# Patient Record
Sex: FEMALE | Race: WHITE | NOT HISPANIC OR LATINO | Employment: OTHER | URBAN - METROPOLITAN AREA
[De-identification: names, ages, dates, MRNs, and addresses within clinical notes are randomized per-mention and may not be internally consistent; named-entity substitution may affect disease eponyms.]

---

## 2017-08-27 ENCOUNTER — OFFICE VISIT (OUTPATIENT)
Dept: URGENT CARE | Facility: URGENT CARE | Age: 62
End: 2017-08-27
Payer: COMMERCIAL

## 2017-08-27 VITALS
HEART RATE: 76 BPM | OXYGEN SATURATION: 97 % | WEIGHT: 172.5 LBS | SYSTOLIC BLOOD PRESSURE: 141 MMHG | BODY MASS INDEX: 27.43 KG/M2 | DIASTOLIC BLOOD PRESSURE: 93 MMHG | TEMPERATURE: 98.2 F

## 2017-08-27 DIAGNOSIS — R30.0 DYSURIA: Primary | ICD-10-CM

## 2017-08-27 DIAGNOSIS — R39.9 UTI SYMPTOMS: ICD-10-CM

## 2017-08-27 LAB
ALBUMIN UR-MCNC: NEGATIVE MG/DL
APPEARANCE UR: CLEAR
BACTERIA #/AREA URNS HPF: ABNORMAL /HPF
BILIRUB UR QL STRIP: NEGATIVE
COLOR UR AUTO: YELLOW
GLUCOSE UR STRIP-MCNC: NEGATIVE MG/DL
HGB UR QL STRIP: NEGATIVE
KETONES UR STRIP-MCNC: NEGATIVE MG/DL
LEUKOCYTE ESTERASE UR QL STRIP: ABNORMAL
NITRATE UR QL: NEGATIVE
NON-SQ EPI CELLS #/AREA URNS LPF: ABNORMAL /LPF
PH UR STRIP: 6 PH (ref 5–7)
RBC #/AREA URNS AUTO: ABNORMAL /HPF
SOURCE: ABNORMAL
SP GR UR STRIP: <=1.005 (ref 1–1.03)
UROBILINOGEN UR STRIP-ACNC: 0.2 EU/DL (ref 0.2–1)
WBC #/AREA URNS AUTO: ABNORMAL /HPF

## 2017-08-27 PROCEDURE — 99213 OFFICE O/P EST LOW 20 MIN: CPT | Performed by: PHYSICIAN ASSISTANT

## 2017-08-27 PROCEDURE — 81001 URINALYSIS AUTO W/SCOPE: CPT | Performed by: PHYSICIAN ASSISTANT

## 2017-08-27 PROCEDURE — 87086 URINE CULTURE/COLONY COUNT: CPT | Performed by: PHYSICIAN ASSISTANT

## 2017-08-27 RX ORDER — CIPROFLOXACIN 250 MG/1
250 TABLET, FILM COATED ORAL 2 TIMES DAILY
Qty: 6 TABLET | Refills: 0 | Status: SHIPPED | OUTPATIENT
Start: 2017-08-27 | End: 2017-08-30

## 2017-08-27 ASSESSMENT — ENCOUNTER SYMPTOMS
VOMITING: 0
ABDOMINAL PAIN: 0
HEMATURIA: 0
SHORTNESS OF BREATH: 0
DYSURIA: 1
SORE THROAT: 0
FEVER: 0
PALPITATIONS: 0
WHEEZING: 0
FLANK PAIN: 0
FREQUENCY: 1
DIARRHEA: 0
COUGH: 0
CHILLS: 0
NAUSEA: 0

## 2017-08-27 NOTE — PROGRESS NOTES
SUBJECTIVE:                                                    Jena Ibarra is a 62 year old female who presents to clinic today for the following health issues:      Urinary       Duration: a few weeks     Description (location/character/radiation): urgency, pressure in lower abdomen, headache     Intensity:  mild    Accompanying signs and symptoms: none     History (similar episodes/previous evaluation): None    Precipitating or alleviating factors: None    Therapies tried and outcome: has been pushing fluids, ibuprofen with no relief      Has been pushing fluids today. Feels very similar to previous UTI's. Symptoms started after having intercourse. No fever/chills.     Problem list and histories reviewed & adjusted, as indicated.  Additional history: as documented    Patient Active Problem List   Diagnosis     CARDIOVASCULAR SCREENING; LDL GOAL LESS THAN 160     Wrist pain     Advanced directives, counseling/discussion     Radial styloid tenosynovitis     CTS (carpal tunnel syndrome)     Atrophic vaginitis     Past Surgical History:   Procedure Laterality Date     COLONOSCOPY       COLONOSCOPY WITH CO2 INSUFFLATION N/A 10/13/2014    Procedure: COLONOSCOPY WITH CO2 INSUFFLATION;  Surgeon: Jamal Beach MD;  Location: [facility]     TUBAL LIGATION         Social History   Substance Use Topics     Smoking status: Never Smoker     Smokeless tobacco: Never Used     Alcohol use Yes      Comment: occasional     Family History   Problem Relation Age of Onset     CANCER Mother      kidney     DIABETES Mother      HEART DISEASE Mother      Lipids Mother      Hypertension Mother      Arthritis Father      Lipids Father      Hypertension Father      OSTEOPOROSIS Father      Arthritis Sister      Neurologic Disorder Sister      seizures     CANCER Brother      pancreatic         Current Outpatient Prescriptions   Medication Sig Dispense Refill     ciprofloxacin (CIPRO) 250 MG tablet Take 1 tablet (250 mg) by mouth  2 times daily for 3 days 6 tablet 0     Calcium Carbonate-Vitamin D (CALCIUM + D PO) Take  by mouth.       No Known Allergies  Labs reviewed in EPIC      ROS:  Review of Systems   Constitutional: Negative for chills, fever and malaise/fatigue.   HENT: Negative for congestion, ear pain and sore throat.    Respiratory: Negative for cough, shortness of breath and wheezing.    Cardiovascular: Negative for chest pain and palpitations.   Gastrointestinal: Negative for abdominal pain, diarrhea, nausea and vomiting.   Genitourinary: Positive for dysuria, frequency and urgency. Negative for flank pain and hematuria.   Skin: Negative for rash.         OBJECTIVE:     BP (!) 141/93  Pulse 76  Temp 98.2  F (36.8  C) (Tympanic)  Wt 172 lb 8 oz (78.2 kg)  SpO2 97%  BMI 27.43 kg/m2  Body mass index is 27.43 kg/(m^2).  Physical Exam   Constitutional: She is well-developed, well-nourished, and in no distress.   Abdominal: There is no tenderness.   No CVA tenderness   Psychiatric:   Alert and cooperative       Diagnostic Test Results:  Urinalysis - small leukocytes    ASSESSMENT/PLAN:     1. Dysuria  Will culture urine. Continue to monitor symptom and push fluids. Prescribed cipro x 3 days that she can fill if symptoms worsen or do not improve. Return to clinic if symptoms worsen or do not improve; otherwise follow up as needed    - ciprofloxacin (CIPRO) 250 MG tablet; Take 1 tablet (250 mg) by mouth 2 times daily for 3 days  Dispense: 6 tablet; Refill: 0  - Urine Culture Aerobic Bacterial    2. UTI symptoms    - *UA reflex to Microscopic and Culture ([place] [facility] (except [place] and [place])  - Urine Microscopic       See Patient Instructions    Helene Valadez PA-C  [facility]

## 2017-08-27 NOTE — PATIENT INSTRUCTIONS
Continue to monitor symptoms and push fluids  Will culture urine  Can fill antibiotic if symptoms worsen or do not improve.

## 2017-08-27 NOTE — NURSING NOTE
"Chief Complaint   Patient presents with     Urinary Problem       Initial BP (!) 141/93  Pulse 76  Temp 98.2  F (36.8  C) (Tympanic)  Wt 172 lb 8 oz (78.2 kg)  SpO2 97%  BMI 27.43 kg/m2 Estimated body mass index is 27.43 kg/(m^2) as calculated from the following:    Height as of 8/13/15: 5' 6.5\" (1.689 m).    Weight as of this encounter: 172 lb 8 oz (78.2 kg).  Medication Reconciliation: complete       JAKE Sesay    "

## 2017-08-27 NOTE — MR AVS SNAPSHOT
After Visit Summary   8/27/2017    Jena Ibarra    MRN: [id number]           Patient Information     Date Of Birth          1955        Visit Information        Provider Department      8/27/2017 2:35 PM Helene Valadez PA-C [facility] [place] [facility]        Today's Diagnoses     Dysuria    -  1    UTI symptoms          Care Instructions    Continue to monitor symptoms and push fluids  Will culture urine  Can fill antibiotic if symptoms worsen or do not improve.           Follow-ups after your visit        Follow-up notes from your care team     Return if symptoms worsen or fail to improve.      Who to contact     If you have questions or need follow up information about today's clinic visit or your schedule please contact [facility] directly at [phone].  Normal or non-critical lab and imaging results will be communicated to you by [employer], letter or phone within 4 business days after the clinic has received the results. If you do not hear from us within 7 days, please contact the clinic through [employer] or phone. If you have a critical or abnormal lab result, we will notify you by phone as soon as possible.  Submit refill requests through [employer] or call your pharmacy and they will forward the refill request to us. Please allow 3 business days for your refill to be completed.          Additional Information About Your Visit        MyChart Information     [employer] gives you secure access to your electronic health record. If you see a primary care provider, you can also send messages to your care team and make appointments. If you have questions, please call your primary care clinic.  If you do not have a primary care provider, please call [phone] and they will assist you.        Care EveryWhere ID     This is your Care EveryWhere ID. This could be used by other organizations to access your [place] medical records  [id number]        Your Vitals Were     Pulse Temperature  Pulse Oximetry BMI (Body Mass Index)          76 98.2  F (36.8  C) (Tympanic) 97% 27.43 kg/m2         Blood Pressure from Last 3 Encounters:   08/27/17 (!) 141/93   10/08/15 126/82   08/13/15 121/73    Weight from Last 3 Encounters:   08/27/17 172 lb 8 oz (78.2 kg)   10/08/15 164 lb (74.4 kg)   08/13/15 165 lb (74.8 kg)              We Performed the Following     *UA reflex to Microscopic and Culture ([place] [facility] (except [place] and [place])     Urine Culture Aerobic Bacterial     Urine Microscopic          Today's Medication Changes          These changes are accurate as of: 8/27/17  4:04 PM.  If you have any questions, ask your nurse or doctor.               Start taking these medicines.        Dose/Directions    ciprofloxacin 250 MG tablet   Commonly known as:  CIPRO   Used for:  Dysuria   Started by:  Helene Valadez PA-C        Dose:  250 mg   Take 1 tablet (250 mg) by mouth 2 times daily for 3 days   Quantity:  6 tablet   Refills:  0         Stop taking these medicines if you haven't already. Please contact your care team if you have questions.     ASTROGLIDE Gel   Stopped by:  Helene Valadez PA-C           conjugated estrogens cream   Commonly known as:  PREMARIN   Stopped by:  Helene Valadez PA-C                Where to get your medicines      These medications were sent to [id number] - [place], [place] - [address]  [address] [address]     Phone:  [phone]     ciprofloxacin 250 MG tablet                Primary Care Provider Office Phone # Fax #    Stacia Ogden [phone] [address] [facility] [address]        Equal Access to Services     [facility] Center[facility] : Hadkassie Love, wadiada lurobinadaha, qaybcal kaalmada adriana, cheyenne atkinson. So [facility] [phone].    ATENCIÓN: Si habla español, tiene a ferraro disposición servicios gratuitos de asistencia lingüística. Llame al  [phone].    We comply with applicable federal civil rights laws and [place] laws. We do not discriminate on the basis of race, color, national origin, age, disability sex, sexual orientation or gender identity.            Thank you!     Thank you for choosing [facility]  [facility] your [facility]. Our goal is always to provide you with excellent care. Hearing back from our patients is one way we can continue to improve our services. Please take a few minutes to complete the written survey that you may receive in the mail after your visit with us. Thank you!             Your Updated Medication List - Protect others around you: Learn how to safely use, store and throw away your medicines at www.disposemymeds.org.          This list is accurate as of: 8/27/17  4:04 PM.  Always use your most recent med list.                   Brand Name Dispense Instructions for use Diagnosis    CALCIUM + D PO      Take  by mouth.        ciprofloxacin 250 MG tablet    CIPRO    6 tablet    Take 1 tablet (250 mg) by mouth 2 times daily for 3 days    Dysuria

## 2017-08-28 LAB
BACTERIA SPEC CULT: NORMAL
BACTERIA SPEC CULT: NORMAL
SPECIMEN SOURCE: NORMAL

## 2017-10-12 NOTE — PROGRESS NOTES
SUBJECTIVE:   CC: Jena Ibarra is an 62 year old woman who presents for preventive health visit.     Healthy Habits:    Do you get at least three servings of calcium containing foods daily (dairy, green leafy vegetables, etc.)? yes    Amount of exercise or daily activities, outside of work: none    Problems taking medications regularly No    Medication side effects: No    Have you had an eye exam in the past two years? yes    Do you see a dentist twice per year? yes    Do you have sleep apnea, excessive snoring or daytime drowsiness?YES          Rectal Bleeding x1 month  Will notice with BM at times- colonoscopy due next year  History of colon polyps. Last Colonoscopy in 2014.       Dizzy Spell  Incident happened x1 month ago and again x2-3 days after.  Has not happened since, reports improvements with symptoms are eating.     Requesting a refill on Premarin cream for atrophic vaginitis.   Due for a Pap test today.      Blood pressure is slightly elevated today. 146/89. No known history of hypertension    States that she is scheduled to see [facility] for her sagging eyelids but needs a referral from her PCP before she is seen.     Patient informed that anything we discuss that is not related to preventative medicine, may be billed for; patient verbalizes understanding.        Today's PHQ-2 Score:   PHQ-2 ( 1999 [employer]) 10/13/2017 10/13/2017   Q1: Little interest or pleasure in doing things 0 0   Q2: Feeling down, depressed or hopeless 0 0   PHQ-2 Score 0 0         Abuse: Current or Past(Physical, Sexual or Emotional)- No  Do you feel safe in your environment - Yes  Social History   Substance Use Topics     Smoking status: Never Smoker     Smokeless tobacco: Never Used     Alcohol use Yes      Comment: occasional     The patient does not drink >3 drinks per day nor >7 drinks per week.    Reviewed orders with patient.  Reviewed health maintenance and updated orders accordingly - Yes  Patient Active Problem  List   Diagnosis     CARDIOVASCULAR SCREENING; LDL GOAL LESS THAN 160     Wrist pain     Advanced directives, counseling/discussion     Radial styloid tenosynovitis     CTS (carpal tunnel syndrome)     Atrophic vaginitis     Past Surgical History:   Procedure Laterality Date     COLONOSCOPY       COLONOSCOPY WITH CO2 INSUFFLATION N/A 10/13/2014    Procedure: COLONOSCOPY WITH CO2 INSUFFLATION;  Surgeon: Jamal Beach MD;  Location: [facility] OR     TUBAL LIGATION         Social History   Substance Use Topics     Smoking status: Never Smoker     Smokeless tobacco: Never Used     Alcohol use Yes      Comment: occasional     Family History   Problem Relation Age of Onset     CANCER Mother      kidney     DIABETES Mother      HEART DISEASE Mother      Lipids Mother      Hypertension Mother      Colon Polyps Mother       benign     Arthritis Father      Lipids Father      Hypertension Father      OSTEOPOROSIS Father      Arthritis Sister      Neurologic Disorder Sister      seizures     CANCER Brother      pancreatic     Breast Cancer No family hx of          Current Outpatient Prescriptions   Medication Sig Dispense Refill     conjugated estrogens (PREMARIN) cream Place 0.5 g vaginally once a week 30 g 0     Calcium Carbonate-Vitamin D (CALCIUM + D PO) Take  by mouth.       No Known Allergies      Patient over age 50, mutual decision to screen reflected in health maintenance.      Pertinent mammograms are reviewed under the imaging tab.  History of abnormal Pap smear:   YES - updated in Problem List and Health Maintenance accordingly  Last 3 Pap Results:   PAP (no units)   Date Value   09/04/2014 NIL   01/28/2011 NIL       Reviewed and updated as needed this visit by clinical staff  Tobacco  Allergies  Meds  Soc Hx        Reviewed and updated as needed this visit by Provider        Past Medical History:   Diagnosis Date     Diverticulosis      History of colon polyps      Hyperlipidemia LDL goal < 160       "Osteopenia       Past Surgical History:   Procedure Laterality Date     COLONOSCOPY       COLONOSCOPY WITH CO2 INSUFFLATION N/A 10/13/2014    Procedure: COLONOSCOPY WITH CO2 INSUFFLATION;  Surgeon: Jamal Beach MD;  Location: [facility]     TUBAL LIGATION       Obstetric History       T4      L4     SAB0   TAB0   Ectopic0   Multiple0   Live Births0       # Outcome Date GA Lbr [place]/2nd Weight Sex Delivery Anes PTL Lv   6 Term            5 Term            4 Term            3 Term            2             1                    ROS:  C: NEGATIVE for fever, chills, change in weight  I: NEGATIVE for worrisome rashes, moles or lesions  E: NEGATIVE for vision changes or irritation  ENT: NEGATIVE for ear, mouth and throat problems  R: NEGATIVE for significant cough or SOB  B: NEGATIVE for masses, tenderness or discharge  CV: NEGATIVE for chest pain, palpitations or peripheral edema  GI: NEGATIVE for nausea, abdominal pain, heartburn, or change in bowel habits  : NEGATIVE for unusual urinary or vaginal symptoms. No vaginal bleeding.  M: NEGATIVE for significant arthralgias or myalgia  N: NEGATIVE for weakness, dizziness or paresthesias  P: NEGATIVE for changes in mood or affect     OBJECTIVE:   /84  Pulse 74  Temp 98  F (36.7  C) (Tympanic)  Ht 5' 5.5\" (1.664 m)  Wt 169 lb 3.2 oz (76.7 kg)  SpO2 97%  Breastfeeding? No  BMI 27.73 kg/m2  EXAM:  GENERAL: healthy, alert and no distress  EYES: Eyes grossly normal to inspection, PERRL and conjunctivae and sclerae normal. Noted bilateral redundancy and laxity of the eyelid skin.   HENT: ear canals and TM's normal, nose and mouth without ulcers or lesions  NECK: no adenopathy, no asymmetry, masses, or scars and thyroid normal to palpation  RESP: lungs clear to auscultation - no rales, rhonchi or wheezes  BREAST: normal without masses, tenderness or nipple discharge and no palpable axillary masses or adenopathy  CV: regular rate and " rhythm, normal S1 S2, no S3 or S4, no murmur, click or rub, no peripheral edema and peripheral pulses strong  ABDOMEN: soft, nontender, no hepatosplenomegaly, no masses and bowel sounds normal   (female): normal female external genitalia, normal urethral meatus, vaginal mucosa pink, moist, smooth and very dry, absent ruggae, and normal cervix/adnexa/uterus without masses or discharge  RECTAL: normal sphincter tone, no rectal masses  MS: no gross musculoskeletal defects noted, no edema  SKIN: no suspicious lesions or rashes  NEURO: Normal strength and tone, mentation intact and speech normal  PSYCH: mentation appears normal, affect normal/bright    DATA  Reviewed and discussed with patient prior to discharge.  EKG: appears normal, NSR, normal axis, normal intervals, no acute ST/T changes c/w ischemia, no LVH by voltage criteria, there are no prior tracings available    Results for orders placed or performed in visit on 10/13/17   CBC with platelets   Result Value Ref Range    WBC 5.0 4.0 - 11.0 10e9/L    RBC Count 3.98 3.8 - 5.2 10e12/L    Hemoglobin 12.2 11.7 - 15.7 g/dL    Hematocrit 37.0 35.0 - 47.0 %    MCV 93 78 - 100 fl    MCH 30.7 26.5 - 33.0 pg    MCHC 33.0 31.5 - 36.5 g/dL    RDW 12.7 10.0 - 15.0 %    Platelet Count 230 150 - 450 10e9/L       ASSESSMENT/PLAN:   Jena was seen today for physical.    Diagnoses and all orders for this visit:    Routine general medical examination at a health care facility    Cervical cancer screening  -     Pap imaged thin layer screen with HPV - recommended age 30 - 65 years (select HPV order below)  -     HPV High Risk Types DNA Cervical    Lipid screening  -     Lipid Profile (Chol, Trig, HDL, LDL calc); Future    Screening for diabetes mellitus  -     GLUCOSE; Future    Need for hepatitis C screening test  -     Hepatitis C Screen Reflex to HCV RNA Quant and Genotype; Future    Atrophic vaginitis  -     Refill: conjugated estrogens (PREMARIN) cream; Place 0.5 g  "vaginally once a week    Rectal bleeding  -     GASTROENTEROLOGY ADULT REF PROCEDURE ONLY    Dizzy spells  -     CBC with platelets  -     Vitamin B12  -     TSH with free T4 reflex  -     Basic metabolic panel  (Ca, Cl, CO2, Creat, Gluc, K, Na, BUN)  -     EKG 12-lead complete w/read - Clinics    Elevated blood pressure reading without diagnosis of hypertension  Repeat BP at the end of the visit was within goal. Continue to monitor.    Dermatochalasis of both eyelids  -     OPHTHALMOLOGY ADULT REFERRAL            COUNSELING:   Reviewed preventive health counseling, as reflected in patient instructions       Regular exercise       Healthy diet/nutrition    BP Screening:   Last 3 BP Readings:    BP Readings from Last 3 Encounters:   10/13/17 134/84   08/27/17 (!) 141/93   10/08/15 126/82       The following was recommended to the patient:  Re-screen within 4 weeks and recommend lifestyle modifications     reports that she has never smoked. She has never used smokeless tobacco.    Estimated body mass index is 27.73 kg/(m^2) as calculated from the following:    Height as of this encounter: 5' 5.5\" (1.664 m).    Weight as of this encounter: 169 lb 3.2 oz (76.7 kg).   Weight management plan: Discussed healthy diet and exercise guidelines and patient will follow up in 12 months in clinic to re-evaluate.    Counseling Resources:  ATP IV Guidelines  Pooled Cohorts Equation Calculator  Breast Cancer Risk Calculator  FRAX Risk Assessment  ICSI Preventive Guidelines  Dietary Guidelines for Americans, 2010  USDA's MyPlate  ASA Prophylaxis  Lung CA Screening    Follow up annually and as needed thoughout the year.    Tricia Delatorre MD  [facility]  "

## 2017-10-13 ENCOUNTER — OFFICE VISIT (OUTPATIENT)
Dept: FAMILY MEDICINE | Facility: CLINIC | Age: 62
End: 2017-10-13
Payer: COMMERCIAL

## 2017-10-13 VITALS
WEIGHT: 169.2 LBS | TEMPERATURE: 98 F | DIASTOLIC BLOOD PRESSURE: 84 MMHG | HEIGHT: 66 IN | HEART RATE: 74 BPM | BODY MASS INDEX: 27.19 KG/M2 | OXYGEN SATURATION: 97 % | SYSTOLIC BLOOD PRESSURE: 134 MMHG

## 2017-10-13 DIAGNOSIS — Z00.00 ROUTINE GENERAL MEDICAL EXAMINATION AT A HEALTH CARE FACILITY: Primary | ICD-10-CM

## 2017-10-13 DIAGNOSIS — K62.5 RECTAL BLEEDING: ICD-10-CM

## 2017-10-13 DIAGNOSIS — R42 DIZZY SPELLS: ICD-10-CM

## 2017-10-13 DIAGNOSIS — Z13.1 SCREENING FOR DIABETES MELLITUS: ICD-10-CM

## 2017-10-13 DIAGNOSIS — N95.2 ATROPHIC VAGINITIS: ICD-10-CM

## 2017-10-13 DIAGNOSIS — R03.0 ELEVATED BLOOD PRESSURE READING WITHOUT DIAGNOSIS OF HYPERTENSION: ICD-10-CM

## 2017-10-13 DIAGNOSIS — Z12.4 CERVICAL CANCER SCREENING: ICD-10-CM

## 2017-10-13 DIAGNOSIS — Z13.220 LIPID SCREENING: ICD-10-CM

## 2017-10-13 DIAGNOSIS — H02.833 DERMATOCHALASIS OF BOTH EYELIDS: ICD-10-CM

## 2017-10-13 DIAGNOSIS — H02.836 DERMATOCHALASIS OF BOTH EYELIDS: ICD-10-CM

## 2017-10-13 DIAGNOSIS — Z11.59 NEED FOR HEPATITIS C SCREENING TEST: ICD-10-CM

## 2017-10-13 LAB
ANION GAP SERPL CALCULATED.3IONS-SCNC: 9 MMOL/L (ref 3–14)
BUN SERPL-MCNC: 20 MG/DL (ref 7–30)
CALCIUM SERPL-MCNC: 9 MG/DL (ref 8.5–10.1)
CHLORIDE SERPL-SCNC: 105 MMOL/L (ref 94–109)
CO2 SERPL-SCNC: 26 MMOL/L (ref 20–32)
CREAT SERPL-MCNC: 0.83 MG/DL (ref 0.52–1.04)
ERYTHROCYTE [DISTWIDTH] IN BLOOD BY AUTOMATED COUNT: 12.7 % (ref 10–15)
GFR SERPL CREATININE-BSD FRML MDRD: 69 ML/MIN/1.7M2
GLUCOSE SERPL-MCNC: 85 MG/DL (ref 70–99)
HCT VFR BLD AUTO: 37 % (ref 35–47)
HGB BLD-MCNC: 12.2 G/DL (ref 11.7–15.7)
MCH RBC QN AUTO: 30.7 PG (ref 26.5–33)
MCHC RBC AUTO-ENTMCNC: 33 G/DL (ref 31.5–36.5)
MCV RBC AUTO: 93 FL (ref 78–100)
PLATELET # BLD AUTO: 230 10E9/L (ref 150–450)
POTASSIUM SERPL-SCNC: 4 MMOL/L (ref 3.4–5.3)
RBC # BLD AUTO: 3.98 10E12/L (ref 3.8–5.2)
SODIUM SERPL-SCNC: 140 MMOL/L (ref 133–144)
TSH SERPL DL<=0.005 MIU/L-ACNC: 1.95 MU/L (ref 0.4–4)
VIT B12 SERPL-MCNC: 328 PG/ML (ref 193–986)
WBC # BLD AUTO: 5 10E9/L (ref 4–11)

## 2017-10-13 PROCEDURE — 99213 OFFICE O/P EST LOW 20 MIN: CPT | Mod: 25 | Performed by: FAMILY MEDICINE

## 2017-10-13 PROCEDURE — 87624 HPV HI-RISK TYP POOLED RSLT: CPT | Performed by: FAMILY MEDICINE

## 2017-10-13 PROCEDURE — G0145 SCR C/V CYTO,THINLAYER,RESCR: HCPCS | Performed by: FAMILY MEDICINE

## 2017-10-13 PROCEDURE — 93000 ELECTROCARDIOGRAM COMPLETE: CPT | Performed by: FAMILY MEDICINE

## 2017-10-13 PROCEDURE — 84443 ASSAY THYROID STIM HORMONE: CPT | Performed by: FAMILY MEDICINE

## 2017-10-13 PROCEDURE — 82607 VITAMIN B-12: CPT | Performed by: FAMILY MEDICINE

## 2017-10-13 PROCEDURE — 85027 COMPLETE CBC AUTOMATED: CPT | Performed by: FAMILY MEDICINE

## 2017-10-13 PROCEDURE — 80048 BASIC METABOLIC PNL TOTAL CA: CPT | Performed by: FAMILY MEDICINE

## 2017-10-13 PROCEDURE — 99396 PREV VISIT EST AGE 40-64: CPT | Performed by: FAMILY MEDICINE

## 2017-10-13 PROCEDURE — 36415 COLL VENOUS BLD VENIPUNCTURE: CPT | Performed by: FAMILY MEDICINE

## 2017-10-13 ASSESSMENT — PATIENT HEALTH QUESTIONNAIRE - PHQ9
SUM OF ALL RESPONSES TO PHQ QUESTIONS 1-9: 2
5. POOR APPETITE OR OVEREATING: NOT AT ALL

## 2017-10-13 ASSESSMENT — ANXIETY QUESTIONNAIRES
5. BEING SO RESTLESS THAT IT IS HARD TO SIT STILL: NOT AT ALL
6. BECOMING EASILY ANNOYED OR IRRITABLE: NOT AT ALL
2. NOT BEING ABLE TO STOP OR CONTROL WORRYING: NOT AT ALL
IF YOU CHECKED OFF ANY PROBLEMS ON THIS QUESTIONNAIRE, HOW DIFFICULT HAVE THESE PROBLEMS MADE IT FOR YOU TO DO YOUR WORK, TAKE CARE OF THINGS AT HOME, OR GET ALONG WITH OTHER PEOPLE: NOT DIFFICULT AT ALL
3. WORRYING TOO MUCH ABOUT DIFFERENT THINGS: NOT AT ALL
GAD7 TOTAL SCORE: 0
1. FEELING NERVOUS, ANXIOUS, OR ON EDGE: NOT AT ALL
7. FEELING AFRAID AS IF SOMETHING AWFUL MIGHT HAPPEN: NOT AT ALL

## 2017-10-13 NOTE — MR AVS SNAPSHOT
After Visit Summary   10/13/2017    Jena Ibarra    MRN: [id number]           Patient Information     Date Of Birth          1955        Visit Information        Provider Department      10/13/2017 3:30 PM Tricia Delatorre MD [facility]        Today's Diagnoses     Routine general medical examination at a health care facility    -  1    Atrophic vaginitis        Cervical cancer screening        Lipid screening        Screening for diabetes mellitus        Need for hepatitis C screening test        Rectal bleeding        Dizzy spells        Elevated blood pressure reading without diagnosis of hypertension        Dermatochalasis of both eyelids          Care Instructions      Preventive Health Recommendations  Female Ages 50 - 64    Yearly exam: See your health care provider every year in order to  o Review health changes.   o Discuss preventive care.    o Review your medicines if your doctor has prescribed any.      Get a Pap test every three years (unless you have an abnormal result and your provider advises testing more often).    If you get Pap tests with HPV test, you only need to test every 5 years, unless you have an abnormal result.     You do not need a Pap test if your uterus was removed (hysterectomy) and you have not had cancer.    You should be tested each year for STDs (sexually transmitted diseases) if you're at risk.     Have a mammogram every 1 to 2 years.    Have a colonoscopy at age 50, or have a yearly FIT test (stool test). These exams screen for colon cancer.      Have a cholesterol test every 5 years, or more often if advised.    Have a diabetes test (fasting glucose) every three years. If you are at risk for diabetes, you should have this test more often.     If you are at risk for osteoporosis (brittle bone disease), think about having a bone density scan (DEXA).    Shots: Get a flu shot each year. Get a tetanus shot every 10 years.    Nutrition:     Eat at  least 5 servings of fruits and vegetables each day.    Eat whole-grain bread, whole-wheat pasta and brown rice instead of white grains and rice.    Talk to your provider about Calcium and Vitamin D.     Lifestyle    Exercise at least 150 minutes a week (30 minutes a day, 5 days a week). This will help you control your weight and prevent disease.    Limit alcohol to one drink per day.    No smoking.     Wear sunscreen to prevent skin cancer.     See your dentist every six months for an exam and cleaning.    See your eye doctor every 1 to 2 years.            Follow-ups after your visit        Additional Services     GASTROENTEROLOGY ADULT REF PROCEDURE ONLY       Last Lab Result: Creatinine (mg/dL)       Date                     Value                 09/04/2014               0.93             ----------  Body mass index is 27.73 kg/(m^2).     Needed:  No  Language:  English    Patient will be contacted to schedule procedure.     Please be aware that coverage of these services is subject to the terms and limitations of your health insurance plan.  Call member services at your health plan with any benefit or coverage questions.  Any procedures must be performed at a [place] facility OR coordinated by your clinic's referral office.    Please bring the following with you to your appointment:    (1) Any X-Rays, CTs or MRIs which have been performed.  Contact the facility where they were done to arrange for  prior to your scheduled appointment.    (2) List of current medications   (3) This referral request   (4) Any documents/labs given to you for this referral            OPHTHALMOLOGY ADULT REFERRAL       Your provider has referred you to: [facility] [phone]   [email or web address]    Please be aware that coverage of these services is subject to the terms and limitations of your health insurance plan.  Call member  services at your health plan with any benefit or coverage questions.      Please bring the following with you to your appointment:    (1) Any X-Rays, CTs or MRIs which have been performed.  Contact the facility where they were done to arrange for  prior to your scheduled appointment.    (2) List of current medications  (3) This referral request   (4) Any documents/labs given to you for this referral                  Follow-up notes from your care team     Return if symptoms worsen or fail to improve.      Future tests that were ordered for you today     Open Future Orders        Priority Expected Expires Ordered    Hepatitis C Screen Reflex to HCV RNA Quant and Genotype Routine  10/13/2018 10/13/2017    GLUCOSE Routine  10/13/2018 10/13/2017    Lipid Profile (Chol, Trig, HDL, LDL calc) Routine  10/13/2018 10/13/2017            Who to contact     Normal or non-critical lab and imaging results will be communicated to you by [employer], letter or phone within 4 business days after the clinic has received the results. If you do not hear from us within 7 days, please contact the clinic through Cellart[employer] or phone. If you have a critical or abnormal lab result, we will notify you by phone as soon as possible.  Submit refill requests through [employer] or call your pharmacy and they will forward the refill request to us. Please allow 3 business days for your refill to be completed.          If you need to speak with a  for additional information , please call: [phone]             Additional Information About Your Visit        [employer] Information     [employer] gives you secure access to your electronic health record. If you see a primary care provider, you can also send messages to your care team and make appointments. If you have questions, please call your primary care clinic.  If you do not have a primary care provider, please call [phone] and they will assist you.        Care EveryWhere ID      "This is your Care EveryWhere ID. This could be used by other organizations to access your [place] medical records  [id number]        Your Vitals Were     Pulse Temperature Height Pulse Oximetry Breastfeeding? BMI (Body Mass Index)    74 98  F (36.7  C) (Tympanic) 5' 5.5\" (1.664 m) 97% No 27.73 kg/m2       Blood Pressure from Last 3 Encounters:   10/13/17 134/84   08/27/17 (!) 141/93   10/08/15 126/82    Weight from Last 3 Encounters:   10/13/17 169 lb 3.2 oz (76.7 kg)   08/27/17 172 lb 8 oz (78.2 kg)   10/08/15 164 lb (74.4 kg)              We Performed the Following     Basic metabolic panel  (Ca, Cl, CO2, Creat, Gluc, K, Na, BUN)     CBC with platelets     EKG 12-lead complete w/read - Clinics     GASTROENTEROLOGY ADULT REF PROCEDURE ONLY     HPV High Risk Types DNA Cervical     OPHTHALMOLOGY ADULT REFERRAL     Pap imaged thin layer screen with HPV - recommended age 30 - 65 years (select HPV order below)     TSH with free T4 reflex     Vitamin B12          Today's Medication Changes          These changes are accurate as of: 10/13/17  4:31 PM.  If you have any questions, ask your nurse or doctor.               These medicines have changed or have updated prescriptions.        Dose/Directions    conjugated estrogens cream   Commonly known as:  PREMARIN   This may have changed:    - how much to take  - when to take this   Used for:  Atrophic vaginitis   Changed by:  Tricia Delatorre MD        Dose:  0.5 g   Place 0.5 g vaginally once a week   Quantity:  30 g   Refills:  0            Where to get your medicines      These medications were sent to [id number] - [place], [place] - [address]  [address] [address]     Phone:  [phone]     conjugated estrogens cream                Primary Care Provider Office Phone # Fax #    Stacia Ogden [phone] [address] [facility] [address]        Equal Access to Services     STORM ELIZONDO AH: Hadii " kimberlee Love, camille saenzsteph, qasatyata kafeli giulianoalberto, waxyanique kevin diallokarinejessica peoplesCathiegabe china. So [facility] [phone].    ATENCIÓN: Si habla español, tiene a ferraro disposición servicios gratuitos de asistencia lingüística. Llame al [phone].    We comply with applicable federal civil rights laws and [place] laws. We do not discriminate on the basis of race, color, national origin, age, disability, sex, sexual orientation, or gender identity.            Thank you!     Thank you for choosing [facility]  for your care. Our goal is always to provide you with excellent care. Hearing back from our patients is one way we can continue to improve our services. Please take a few minutes to complete the written survey that you may receive in the mail after your visit with us. Thank you!             Your Updated Medication List - Protect others around you: Learn how to safely use, store and throw away your medicines at www.disposemymeds.org.          This list is accurate as of: 10/13/17  4:31 PM.  Always use your most recent med list.                   Brand Name Dispense Instructions for use Diagnosis    CALCIUM + D PO      Take  by mouth.        conjugated estrogens cream    PREMARIN    30 g    Place 0.5 g vaginally once a week    Atrophic vaginitis

## 2017-10-13 NOTE — NURSING NOTE
"Chief Complaint   Patient presents with     Physical       Initial /89  Pulse 74  Temp 98  F (36.7  C) (Tympanic)  Ht 5' 5.5\" (1.664 m)  Wt 169 lb 3.2 oz (76.7 kg)  SpO2 97%  Breastfeeding? No  BMI 27.73 kg/m2 Estimated body mass index is 27.73 kg/(m^2) as calculated from the following:    Height as of this encounter: 5' 5.5\" (1.664 m).    Weight as of this encounter: 169 lb 3.2 oz (76.7 kg).  Medication Reconciliation: complete       Ale Woods MA      "

## 2017-10-14 ASSESSMENT — ANXIETY QUESTIONNAIRES: GAD7 TOTAL SCORE: 0

## 2017-10-17 LAB
COPATH REPORT: NORMAL
PAP: NORMAL

## 2017-10-20 LAB
FINAL DIAGNOSIS: NORMAL
HPV HR 12 DNA CVX QL NAA+PROBE: NEGATIVE
HPV16 DNA SPEC QL NAA+PROBE: NEGATIVE
HPV18 DNA SPEC QL NAA+PROBE: NEGATIVE
SPECIMEN DESCRIPTION: NORMAL

## 2017-10-27 DIAGNOSIS — Z11.59 NEED FOR HEPATITIS C SCREENING TEST: ICD-10-CM

## 2017-10-27 DIAGNOSIS — Z13.1 SCREENING FOR DIABETES MELLITUS: ICD-10-CM

## 2017-10-27 DIAGNOSIS — Z13.220 LIPID SCREENING: ICD-10-CM

## 2017-10-27 LAB
CHOLEST SERPL-MCNC: 258 MG/DL
GLUCOSE SERPL-MCNC: 93 MG/DL (ref 70–99)
HCV AB SERPL QL IA: NONREACTIVE
HDLC SERPL-MCNC: 70 MG/DL
LDLC SERPL CALC-MCNC: 151 MG/DL
NONHDLC SERPL-MCNC: 188 MG/DL
TRIGL SERPL-MCNC: 185 MG/DL

## 2017-10-27 PROCEDURE — 86803 HEPATITIS C AB TEST: CPT | Performed by: FAMILY MEDICINE

## 2017-10-27 PROCEDURE — 36415 COLL VENOUS BLD VENIPUNCTURE: CPT | Performed by: FAMILY MEDICINE

## 2017-10-27 PROCEDURE — 82947 ASSAY GLUCOSE BLOOD QUANT: CPT | Performed by: FAMILY MEDICINE

## 2017-10-27 PROCEDURE — 80061 LIPID PANEL: CPT | Performed by: FAMILY MEDICINE

## 2017-11-14 ENCOUNTER — HOSPITAL ENCOUNTER (OUTPATIENT)
Facility: AMBULATORY SURGERY CENTER | Age: 62
Discharge: HOME | End: 2017-11-14
Attending: SURGERY | Admitting: SURGERY
Payer: COMMERCIAL

## 2017-11-14 ENCOUNTER — SURGERY (OUTPATIENT)
Age: 62
End: 2017-11-14

## 2017-11-14 VITALS
TEMPERATURE: 98.3 F | DIASTOLIC BLOOD PRESSURE: 79 MMHG | RESPIRATION RATE: 16 BRPM | SYSTOLIC BLOOD PRESSURE: 107 MMHG | OXYGEN SATURATION: 97 %

## 2017-11-14 LAB — COLONOSCOPY: NORMAL

## 2017-11-14 PROCEDURE — 45378 DIAGNOSTIC COLONOSCOPY: CPT | Performed by: SURGERY

## 2017-11-14 PROCEDURE — 45378 DIAGNOSTIC COLONOSCOPY: CPT

## 2017-11-14 PROCEDURE — G8918 PT W/O PREOP ORDER IV AB PRO: HCPCS

## 2017-11-14 PROCEDURE — 99152 MOD SED SAME PHYS/QHP 5/>YRS: CPT | Performed by: SURGERY

## 2017-11-14 PROCEDURE — G8907 PT DOC NO EVENTS ON DISCHARG: HCPCS

## 2017-11-14 RX ORDER — FENTANYL CITRATE 50 UG/ML
INJECTION, SOLUTION INTRAMUSCULAR; INTRAVENOUS PRN
Status: DISCONTINUED | OUTPATIENT
Start: 2017-11-14 | End: 2017-11-14 | Stop reason: HOSPADM

## 2017-11-14 RX ORDER — ONDANSETRON 2 MG/ML
4 INJECTION INTRAMUSCULAR; INTRAVENOUS
Status: DISCONTINUED | OUTPATIENT
Start: 2017-11-14 | End: 2017-11-15 | Stop reason: HOSPADM

## 2017-11-14 RX ORDER — LIDOCAINE 40 MG/G
CREAM TOPICAL
Status: DISCONTINUED | OUTPATIENT
Start: 2017-11-14 | End: 2017-11-15 | Stop reason: HOSPADM

## 2017-11-14 RX ADMIN — FENTANYL CITRATE 100 MCG: 50 INJECTION, SOLUTION INTRAMUSCULAR; INTRAVENOUS at 09:46

## 2017-11-14 RX ADMIN — FENTANYL CITRATE 50 MCG: 50 INJECTION, SOLUTION INTRAMUSCULAR; INTRAVENOUS at 09:49

## 2017-12-21 NOTE — PROGRESS NOTES
[facility] [place]  [address]  [place] [address]  [phone]  Dept: [phone]    PRE-OP EVALUATION:  Today's date: 2017    Jena Ibarra (: 1955) presents for pre-operative evaluation assessment as requested by Dr. Stacia Young.  She requires evaluation and anesthesia risk assessment prior to undergoing surgery/procedure for treatment of drooping eyelids (dermatochalasis) .  Proposed procedure: remove excess skin around eyes/eyelids    Date of Surgery/ Procedure: 18  Time of Surgery/ Procedure: 7am  Hospital/Surgical Facility: [facility] [place]   Fax: [phone]  Primary Physician: Ludmila Torres  Type of Anesthesia Anticipated: to be determined, lightly sedated per patient     Patient has a Health Care Directive or Living Will:  YES     1. NO - Do you have a history of heart attack, stroke, stent, bypass or surgery on an artery in the head, neck, heart or legs?  2. NO - Do you ever have any pain or discomfort in your chest?  3. NO - Do you have a history of  Heart Failure?  4. NO - Are you troubled by shortness of breath when: walking on the level, up a slight hill or at night?  5. NO - Do you currently have a cold, bronchitis or other respiratory infection?  6. NO - Do you have a cough, shortness of breath or wheezing?  7. NO - Do you sometimes get pains in the calves of your legs when you walk?  8. NO - Do you or anyone in your family have previous history of blood clots?  9. NO - Do you or does anyone in your family have a serious bleeding problem such as prolonged bleeding following surgeries or cuts?  10. NO - Have you ever had problems with anemia or been told to take iron pills?  11. NO - Have you had any abnormal blood loss such as black, tarry or bloody stools, or abnormal vaginal bleeding?  12. NO - Have you ever had a blood transfusion?  13. YES - Have you or any of your relatives ever had problems with anesthesia? Daughter:  nausea/vomiting   14. NO - Do you have sleep apnea, excessive snoring or daytime drowsiness?  15. NO - Do you have any prosthetic heart valves?  16. NO - Do you have prosthetic joints?  17. NO - Is there any chance that you may be pregnant?        HPI:                                                      Brief HPI related to upcoming procedure: dermatochalasis        See problem list for active medical problems.  Problems all longstanding and stable, except as noted/documented.  See ROS for pertinent symptoms related to these conditions.                                                                                                  .    MEDICAL HISTORY:                                                    Patient Active Problem List    Diagnosis Date Noted     Atrophic vaginitis 12/01/2014     Priority: Medium     Radial styloid tenosynovitis 04/18/2013     Priority: Medium     CTS (carpal tunnel syndrome) 04/18/2013     Priority: Medium     Advanced directives, counseling/discussion 04/17/2013     Priority: Medium     Advance Care Planning: Pt does have living well and will bring in a copy. Sheppardshawn Sheppard MA               Wrist pain 12/17/2012     Priority: Medium     CARDIOVASCULAR SCREENING; LDL GOAL LESS THAN 160 10/31/2010     Priority: Medium      Past Medical History:   Diagnosis Date     Diverticulosis      History of colon polyps      Hyperlipidemia LDL goal < 160      Osteopenia      Past Surgical History:   Procedure Laterality Date     COLONOSCOPY       COLONOSCOPY WITH CO2 INSUFFLATION N/A 10/13/2014    Procedure: COLONOSCOPY WITH CO2 INSUFFLATION;  Surgeon: Jamal Beach MD;  Location: [facility] OR     COLONOSCOPY WITH CO2 INSUFFLATION N/A 11/14/2017    Procedure: COLONOSCOPY WITH CO2 INSUFFLATION;  COLON-RECTAL BLEEDING / BARUSYA;  Surgeon: Henok Jewell MD;  Location: [facility] OR     TUBAL LIGATION       Current Outpatient Prescriptions   Medication Sig Dispense Refill     conjugated estrogens  (PREMARIN) cream Place 0.5 g vaginally once a week 30 g 0     Calcium Carbonate-Vitamin D (CALCIUM + D PO) Take  by mouth.       OTC products: None, except as noted above    No Known Allergies   Latex Allergy: NO    Social History   Substance Use Topics     Smoking status: Never Smoker     Smokeless tobacco: Never Used     Alcohol use Yes      Comment: occasional     History   Drug Use No       REVIEW OF SYSTEMS:                                                    Constitutional, neuro, ENT, endocrine, pulmonary, cardiac, gastrointestinal, genitourinary, musculoskeletal, integument and psychiatric systems are negative, except as otherwise noted.      EXAM:                                                    /84  Pulse 71  Temp 98.2  F (36.8  C) (Oral)  Wt 171 lb (77.6 kg)  SpO2 99%  BMI 28.02 kg/m2    GENERAL APPEARANCE: healthy, alert and no distress     EYES: EOMI, PERRL     HENT: ear canals and TM's normal and nose and mouth without ulcers or lesions     NECK: no adenopathy, no asymmetry, masses, or scars and thyroid normal to palpation     RESP: lungs clear to auscultation - no rales, rhonchi or wheezes     CV: regular rates and rhythm, normal S1 S2, no S3 or S4 and no murmur, click or rub     ABDOMEN:  soft, nontender, no HSM or masses and bowel sounds normal     MS: extremities normal- no gross deformities noted, no evidence of inflammation in joints, FROM in all extremities.     SKIN: no suspicious lesions or rashes     NEURO: Normal strength and tone, sensory exam grossly normal, mentation intact and speech normal     PSYCH: mentation appears normal. and affect normal/bright     LYMPHATICS: No axillary, cervical, or supraclavicular nodes    DIAGNOSTICS:                                                    No labs or EKG required for low risk surgery (cataract, skin procedure, breast biopsy, etc)    Recent Labs   Lab Test  10/13/17   1554  09/04/14   1349   HGB  12.2  11.7   PLT  230  247   NA  140   140   POTASSIUM  4.0  4.1   CR  0.83  0.93      IMPRESSION:                                                    Reason for surgery/procedure: dermatochalasis  Diagnosis/reason for consult: Pre op consult    The proposed surgical procedure is considered LOW risk.    REVISED CARDIAC RISK INDEX  The patient has the following serious cardiovascular risks for perioperative complications such as (MI, PE, VFib and 3  AV Block):  No serious cardiac risks  INTERPRETATION: 0 risks: Class I (very low risk - 0.4% complication rate)    The patient has the following additional risks for perioperative complications:  No identified additional risks      ICD-10-CM    1. Preop general physical exam Z01.818    2. Dermatochalasis of eyelids of both eyes H02.833     H02.836        RECOMMENDATIONS:                                                      APPROVAL GIVEN to proceed with proposed procedure, without further diagnostic evaluation       Signed Electronically by: Jahaira Wesley PA-C    Copy of this evaluation report is provided to requesting physician.    [place] Preop Guidelines

## 2017-12-26 ENCOUNTER — OFFICE VISIT (OUTPATIENT)
Dept: FAMILY MEDICINE | Facility: CLINIC | Age: 62
End: 2017-12-26
Payer: COMMERCIAL

## 2017-12-26 VITALS
TEMPERATURE: 98.2 F | BODY MASS INDEX: 28.02 KG/M2 | SYSTOLIC BLOOD PRESSURE: 133 MMHG | OXYGEN SATURATION: 99 % | DIASTOLIC BLOOD PRESSURE: 84 MMHG | HEART RATE: 71 BPM | WEIGHT: 171 LBS

## 2017-12-26 DIAGNOSIS — H02.833 DERMATOCHALASIS OF EYELIDS OF BOTH EYES: ICD-10-CM

## 2017-12-26 DIAGNOSIS — H02.836 DERMATOCHALASIS OF EYELIDS OF BOTH EYES: ICD-10-CM

## 2017-12-26 DIAGNOSIS — Z01.818 PREOP GENERAL PHYSICAL EXAM: Primary | ICD-10-CM

## 2017-12-26 PROCEDURE — 99214 OFFICE O/P EST MOD 30 MIN: CPT | Performed by: PHYSICIAN ASSISTANT

## 2017-12-26 NOTE — NURSING NOTE
"Chief Complaint   Patient presents with     Pre-Op Exam       Initial /84  Pulse 71  Temp 98.2  F (36.8  C) (Oral)  Wt 171 lb (77.6 kg)  SpO2 99%  BMI 28.02 kg/m2 Estimated body mass index is 28.02 kg/(m^2) as calculated from the following:    Height as of 10/13/17: 5' 5.5\" (1.664 m).    Weight as of this encounter: 171 lb (77.6 kg).  Medication Reconciliation: complete     Jailyn Mcguire CMA      "

## 2017-12-26 NOTE — MR AVS SNAPSHOT
After Visit Summary   12/26/2017    Jena Ibarra    MRN: [id number]           Patient Information     Date Of Birth          1955        Visit Information        Provider Department      12/26/2017 9:40 AM Jahaira Wesley PA-C [facility]        Today's Diagnoses     Preop general physical exam    -  1    Dermatochalasis of eyelids of both eyes          Care Instructions      Before Your Surgery      Call your surgeon if there is any change in your health. This includes signs of a cold or flu (such as a sore throat, runny nose, cough, rash or fever).    Do not smoke, drink alcohol or take over the counter medicine (unless your surgeon or primary care doctor tells you to) for the 24 hours before and after surgery.    If you take prescribed drugs: Follow your doctor s orders about which medicines to take and which to stop until after surgery.    Eating and drinking prior to surgery: follow the instructions from your surgeon    Take a shower or bath the night before surgery. Use the soap your surgeon gave you to gently clean your skin. If you do not have soap from your surgeon, use your regular soap. Do not shave or scrub the surgery site.  Wear clean pajamas and have clean sheets on your bed.           Follow-ups after your visit        Who to contact     Normal or non-critical lab and imaging results will be communicated to you by [facility], letter or phone within 4 business days after the clinic has received the results. If you do not hear from us within 7 days, please contact the clinic through [facility] or phone. If you have a critical or abnormal lab result, we will notify you by phone as soon as possible.  Submit refill requests through [employer] or call your pharmacy and they will forward the refill request to us. Please allow 3 business days for your refill to be completed.          If you need to speak with a  for additional information , please call:  [phone]             Additional Information About Your Visit        MyChart Information     [employer] gives you secure access to your electronic health record. If you see a primary care provider, you can also send messages to your care team and make appointments. If you have questions, please call your primary care clinic.  If you do not have a primary care provider, please call [phone] and they will assist you.        Care EveryWhere ID     This is your Care EveryWhere ID. This could be used by other organizations to access your [place] medical records  [id number]        Your Vitals Were     Pulse Temperature Pulse Oximetry BMI (Body Mass Index)          71 98.2  F (36.8  C) (Oral) 99% 28.02 kg/m2         Blood Pressure from Last 3 Encounters:   12/26/17 133/84   11/14/17 107/79   10/13/17 134/84    Weight from Last 3 Encounters:   12/26/17 171 lb (77.6 kg)   10/13/17 169 lb 3.2 oz (76.7 kg)   08/27/17 172 lb 8 oz (78.2 kg)              Today, you had the following     No orders found for display       Primary Care Provider Office Phone # Fax #    [facility] [phone] [address] [address] [address]        Equal Access to Services     STORM ELIZONDO : Hadii aad ku hadasho Soomaali, waaxda luqadaha, qaybta kaalmada adeegyada, waxay segundoin haymarshalln vickie mckinnon lakarina atkinson. So [place] [phone].    ATENCIÓN: Si habla español, tiene a ferraro disposición servicios gratuitos de asistencia lingüística. Llmaria m al [phone].    We comply with applicable federal civil rights laws and [place] laws. We do not discriminate on the basis of race, color, national origin, age, disability, sex, sexual orientation, or gender identity.            Thank you!     Thank you for choosing [facility]  for your care. Our goal is always to provide you with excellent care. Hearing back from our patients is one way we can continue to improve our services. Please take a few minutes to  complete the written survey that you may receive in the mail after your visit with us. Thank you!             Your Updated Medication List - Protect others around you: Learn how to safely use, store and throw away your medicines at www.disposemymeds.org.          This list is accurate as of: 12/26/17  9:57 AM.  Always use your most recent med list.                   Brand Name Dispense Instructions for use Diagnosis    CALCIUM + D PO      Take  by mouth.        conjugated estrogens cream    PREMARIN    30 g    Place 0.5 g vaginally once a week    Atrophic vaginitis

## 2018-02-20 ENCOUNTER — TRANSFERRED RECORDS (OUTPATIENT)
Dept: HEALTH INFORMATION MANAGEMENT | Facility: CLINIC | Age: 63
End: 2018-02-20

## 2018-02-23 ENCOUNTER — TELEPHONE (OUTPATIENT)
Dept: FAMILY MEDICINE | Facility: CLINIC | Age: 63
End: 2018-02-23

## 2018-02-23 NOTE — TELEPHONE ENCOUNTER
The Patient declined Preventive Health Screens for: VIP MAMMO  Please review chart and follow-up with patient if needed.    Thank you

## 2018-03-05 ENCOUNTER — TELEPHONE (OUTPATIENT)
Dept: FAMILY MEDICINE | Facility: CLINIC | Age: 63
End: 2018-03-05

## 2018-03-05 DIAGNOSIS — H26.9 CATARACT, UNSPECIFIED CATARACT TYPE, UNSPECIFIED LATERALITY: Primary | ICD-10-CM

## 2018-03-05 NOTE — TELEPHONE ENCOUNTER
Patient needs referral to see eye doctor in [place] for her cataract. [facility] [phone] Fax# [phone]. Please advise. Ok to leave a message.

## 2018-06-11 ENCOUNTER — TELEPHONE (OUTPATIENT)
Dept: MAMMOGRAPHY | Facility: CLINIC | Age: 63
End: 2018-06-11

## 2018-06-11 NOTE — LETTER
[facility]  [address] [place] [address] [place] [address]  [phone]        June 26, 2018    Jena Ibarra  [address] [place] [address]              Dear Jena Ibarra    This is to remind you that your mammogram is due.    You may call our office at [phone] to schedule an appointment.    Please disregard this notice if you have already made an appointment.        Sincerely,    [facility]

## 2018-06-11 NOTE — TELEPHONE ENCOUNTER
Panel Management Review      Patient has the following on her problem list: None      Composite cancer screening  Chart review shows that this patient is due/due soon for the following Mammogram  Summary:    Patient is due/failing the following:   MAMMOGRAM    Action needed:   Patient needs office visit for mammogram.    Type of outreach:    Phone, spoke to patient.  States she has her mammograms done at [facility]; dami lopez     Spoke with medical records last report they have is from 2015.  Called pt back and advised, pt swears she has one every October at the [facility] [place].  She will try looking into this and find report her self.  Pod 2 hotline and fax number given.  Okay to collect verbal information from pt to have abstracted as pt reported.       Will send letter if not heard back from pt in x2 weeks.     Questions for provider review:    None                                                                                                                                  Ale Woods MA       Chart routed to Care Team .

## 2018-07-06 ENCOUNTER — TRANSFERRED RECORDS (OUTPATIENT)
Dept: HEALTH INFORMATION MANAGEMENT | Facility: CLINIC | Age: 63
End: 2018-07-06

## 2019-04-09 ENCOUNTER — OFFICE VISIT (OUTPATIENT)
Dept: FAMILY MEDICINE | Facility: CLINIC | Age: 64
End: 2019-04-09
Payer: COMMERCIAL

## 2019-04-09 VITALS
RESPIRATION RATE: 18 BRPM | TEMPERATURE: 98.4 F | HEART RATE: 83 BPM | DIASTOLIC BLOOD PRESSURE: 79 MMHG | WEIGHT: 177 LBS | OXYGEN SATURATION: 97 % | BODY MASS INDEX: 29.01 KG/M2 | SYSTOLIC BLOOD PRESSURE: 120 MMHG

## 2019-04-09 DIAGNOSIS — R82.90 NONSPECIFIC FINDING ON EXAMINATION OF URINE: ICD-10-CM

## 2019-04-09 DIAGNOSIS — R10.13 EPIGASTRIC ABDOMINAL PAIN: Primary | ICD-10-CM

## 2019-04-09 LAB
ALBUMIN SERPL-MCNC: 4.2 G/DL (ref 3.4–5)
ALBUMIN UR-MCNC: NEGATIVE MG/DL
ALP SERPL-CCNC: 73 U/L (ref 40–150)
ALT SERPL W P-5'-P-CCNC: 27 U/L (ref 0–50)
APPEARANCE UR: ABNORMAL
AST SERPL W P-5'-P-CCNC: 17 U/L (ref 0–45)
BACTERIA #/AREA URNS HPF: ABNORMAL /HPF
BASOPHILS # BLD AUTO: 0 10E9/L (ref 0–0.2)
BASOPHILS NFR BLD AUTO: 0.3 %
BILIRUB DIRECT SERPL-MCNC: 0.1 MG/DL (ref 0–0.2)
BILIRUB SERPL-MCNC: 0.6 MG/DL (ref 0.2–1.3)
BILIRUB UR QL STRIP: NEGATIVE
COLOR UR AUTO: YELLOW
DIFFERENTIAL METHOD BLD: NORMAL
EOSINOPHIL # BLD AUTO: 0.3 10E9/L (ref 0–0.7)
EOSINOPHIL NFR BLD AUTO: 4.1 %
ERYTHROCYTE [DISTWIDTH] IN BLOOD BY AUTOMATED COUNT: 13.1 % (ref 10–15)
GLUCOSE UR STRIP-MCNC: NEGATIVE MG/DL
HCT VFR BLD AUTO: 41.1 % (ref 35–47)
HGB BLD-MCNC: 13.6 G/DL (ref 11.7–15.7)
HGB UR QL STRIP: NEGATIVE
KETONES UR STRIP-MCNC: NEGATIVE MG/DL
LEUKOCYTE ESTERASE UR QL STRIP: ABNORMAL
LIPASE SERPL-CCNC: 95 U/L (ref 73–393)
LYMPHOCYTES # BLD AUTO: 2.5 10E9/L (ref 0.8–5.3)
LYMPHOCYTES NFR BLD AUTO: 37.7 %
MCH RBC QN AUTO: 30.4 PG (ref 26.5–33)
MCHC RBC AUTO-ENTMCNC: 33.1 G/DL (ref 31.5–36.5)
MCV RBC AUTO: 92 FL (ref 78–100)
MONOCYTES # BLD AUTO: 0.5 10E9/L (ref 0–1.3)
MONOCYTES NFR BLD AUTO: 7.5 %
MUCOUS THREADS #/AREA URNS LPF: PRESENT /LPF
NEUTROPHILS # BLD AUTO: 3.3 10E9/L (ref 1.6–8.3)
NEUTROPHILS NFR BLD AUTO: 50.4 %
NITRATE UR QL: NEGATIVE
NON-SQ EPI CELLS #/AREA URNS LPF: ABNORMAL /LPF
PH UR STRIP: 5.5 PH (ref 5–7)
PLATELET # BLD AUTO: 240 10E9/L (ref 150–450)
PROT SERPL-MCNC: 7.6 G/DL (ref 6.8–8.8)
RBC # BLD AUTO: 4.47 10E12/L (ref 3.8–5.2)
RBC #/AREA URNS AUTO: ABNORMAL /HPF
SOURCE: ABNORMAL
SP GR UR STRIP: 1.02 (ref 1–1.03)
UROBILINOGEN UR STRIP-ACNC: 0.2 EU/DL (ref 0.2–1)
WBC # BLD AUTO: 6.6 10E9/L (ref 4–11)
WBC #/AREA URNS AUTO: ABNORMAL /HPF

## 2019-04-09 PROCEDURE — 83690 ASSAY OF LIPASE: CPT | Performed by: FAMILY MEDICINE

## 2019-04-09 PROCEDURE — 85025 COMPLETE CBC W/AUTO DIFF WBC: CPT | Performed by: FAMILY MEDICINE

## 2019-04-09 PROCEDURE — 36415 COLL VENOUS BLD VENIPUNCTURE: CPT | Performed by: FAMILY MEDICINE

## 2019-04-09 PROCEDURE — 80076 HEPATIC FUNCTION PANEL: CPT | Performed by: FAMILY MEDICINE

## 2019-04-09 PROCEDURE — 99214 OFFICE O/P EST MOD 30 MIN: CPT | Performed by: FAMILY MEDICINE

## 2019-04-09 PROCEDURE — 81001 URINALYSIS AUTO W/SCOPE: CPT | Performed by: FAMILY MEDICINE

## 2019-04-09 PROCEDURE — 87086 URINE CULTURE/COLONY COUNT: CPT | Performed by: FAMILY MEDICINE

## 2019-04-09 ASSESSMENT — PAIN SCALES - GENERAL: PAINLEVEL: NO PAIN (0)

## 2019-04-09 NOTE — NURSING NOTE
"Chief Complaint   Patient presents with     Abdominal Pain     upper abodmen pain - bloated - pressure - nausea after eating        Initial /79   Pulse 83   Temp 98.4  F (36.9  C) (Oral)   Resp 18   Wt 80.3 kg (177 lb)   SpO2 97%   BMI 29.01 kg/m   Estimated body mass index is 29.01 kg/m  as calculated from the following:    Height as of 10/13/17: 1.664 m (5' 5.5\").    Weight as of this encounter: 80.3 kg (177 lb).  Medication Reconciliation: complete  Ruth Harrell M.A.    "

## 2019-04-09 NOTE — PROGRESS NOTES
SUBJECTIVE:  64 year old.The patient has a complaint of upper abdomen problems.  This started 4-5 days ago. Location centeer quality bloated Associated symptoms are nausea.  Brought on by unknown .  Better with tums. ROS no diarrhea or constipation      Reviewed health maintenance  Patient Active Problem List   Diagnosis     CARDIOVASCULAR SCREENING; LDL GOAL LESS THAN 160     Wrist pain     Advanced directives, counseling/discussion     Radial styloid tenosynovitis     CTS (carpal tunnel syndrome)     Atrophic vaginitis     Past Medical History:   Diagnosis Date     Diverticulosis      History of colon polyps      Hyperlipidemia LDL goal < 160      Osteopenia        OBJECTIVE:  no apparent distress  /79   Pulse 83   Temp 98.4  F (36.9  C) (Oral)   Resp 18   Wt 80.3 kg (177 lb)   SpO2 97%   BMI 29.01 kg/m    maalox cocktail with   LUNGS:  CTA B/L, no wheezing or crackles.   Cardiovascular: negative, PMI normal. No lifts, heaves, or thrills. RRR. No murmurs, clicks gallops or rub   Gastrointestinal: Abdomen soft, mid epigastric-tender.no rebound or uarding BS normal. No masses, organomegaly     Lab Results   Component Value Date    WBC 6.6 04/09/2019     Lab Results   Component Value Date    RBC 4.47 04/09/2019     Lab Results   Component Value Date    HGB 13.6 04/09/2019     Lab Results   Component Value Date    HCT 41.1 04/09/2019     No components found for: MCT  Lab Results   Component Value Date    MCV 92 04/09/2019     Lab Results   Component Value Date    MCH 30.4 04/09/2019     Lab Results   Component Value Date    MCHC 33.1 04/09/2019     Lab Results   Component Value Date    RDW 13.1 04/09/2019     Lab Results   Component Value Date     04/09/2019     UA RESULTS:  Recent Labs   Lab Test 04/09/19  1643   COLOR Yellow   APPEARANCE Slightly Cloudy   URINEGLC Negative   URINEBILI Negative   URINEKETONE Negative   SG 1.025   UBLD Negative   URINEPH 5.5   PROTEIN Negative   UROBILINOGEN 0.2    NITRITE Negative   LEUKEST Moderate*   RBCU O - 2   WBCU 5-10*          ICD-10-CM    1. Epigastric abdominal pain R10.13 Lipase     Hepatic panel (Albumin, ALT, AST, Bili, Alk Phos, TP)     US Abdomen Limited     CBC with platelets and differential     UA with Microscopic   2. Nonspecific finding on examination of urine R82.90 Urine Culture Aerobic Bacterial    PLAN: if all test are normal then consider Prilosec for two weeks

## 2019-04-10 LAB
BACTERIA SPEC CULT: NO GROWTH
SPECIMEN SOURCE: NORMAL

## 2019-04-11 ENCOUNTER — ANCILLARY PROCEDURE (OUTPATIENT)
Dept: ULTRASOUND IMAGING | Facility: CLINIC | Age: 64
End: 2019-04-11
Attending: FAMILY MEDICINE
Payer: COMMERCIAL

## 2019-04-11 DIAGNOSIS — R10.13 EPIGASTRIC ABDOMINAL PAIN: ICD-10-CM

## 2019-04-11 PROCEDURE — 76705 ECHO EXAM OF ABDOMEN: CPT

## 2019-07-01 DIAGNOSIS — K80.20 GALL STONES: Primary | ICD-10-CM

## 2019-07-16 ENCOUNTER — OFFICE VISIT (OUTPATIENT)
Dept: SURGERY | Facility: CLINIC | Age: 64
End: 2019-07-16
Attending: FAMILY MEDICINE
Payer: COMMERCIAL

## 2019-07-16 VITALS
SYSTOLIC BLOOD PRESSURE: 145 MMHG | HEART RATE: 86 BPM | WEIGHT: 173 LBS | BODY MASS INDEX: 27.8 KG/M2 | HEIGHT: 66 IN | DIASTOLIC BLOOD PRESSURE: 86 MMHG

## 2019-07-16 DIAGNOSIS — R10.13 EPIGASTRIC PAIN: Primary | ICD-10-CM

## 2019-07-16 LAB
ALBUMIN SERPL-MCNC: 4.1 G/DL (ref 3.4–5)
ALP SERPL-CCNC: 86 U/L (ref 40–150)
ALT SERPL W P-5'-P-CCNC: 39 U/L (ref 0–50)
AST SERPL W P-5'-P-CCNC: 23 U/L (ref 0–45)
BILIRUB DIRECT SERPL-MCNC: 0.1 MG/DL (ref 0–0.2)
BILIRUB SERPL-MCNC: 0.6 MG/DL (ref 0.2–1.3)
LIPASE SERPL-CCNC: 64 U/L (ref 73–393)
PROT SERPL-MCNC: 7.3 G/DL (ref 6.8–8.8)

## 2019-07-16 PROCEDURE — 83690 ASSAY OF LIPASE: CPT | Performed by: SURGERY

## 2019-07-16 PROCEDURE — 36415 COLL VENOUS BLD VENIPUNCTURE: CPT | Performed by: SURGERY

## 2019-07-16 PROCEDURE — 80076 HEPATIC FUNCTION PANEL: CPT | Performed by: SURGERY

## 2019-07-16 PROCEDURE — 99204 OFFICE O/P NEW MOD 45 MIN: CPT | Performed by: SURGERY

## 2019-07-16 RX ORDER — OMEPRAZOLE 20 MG/1
20 TABLET, DELAYED RELEASE ORAL DAILY
Qty: 30 TABLET | Refills: 11 | Status: SHIPPED | OUTPATIENT
Start: 2019-07-16 | End: 2019-09-27

## 2019-07-16 ASSESSMENT — MIFFLIN-ST. JEOR: SCORE: 1341.47

## 2019-07-16 NOTE — LETTER
[facility]           [address] [place] [address]           Tel [phone]  Jena Ibarra  [address] [place] [address]      July 16, 2019    Dear Jena,  This letter is to inform you of the results of your lipase and liver function test.  If you have questions please feel free to call my assistant  At [phone] .    Your report was:  Normal,will see you to do the upper endoscopy.  Remember to let me know if [facility].   If you do have further questions please don t hesitate to call my assistant at  .  We do not have someone answering the phone all the time at my assistants number so if leave a message may take a day or so to get back to you.  So if more urgent then call the below number.    To make an appointment call [phone]: .   Sincerely,     Jamal Beach M.D.  ___

## 2019-07-16 NOTE — PROGRESS NOTES
"Dear Ludmila Garcia  I was asked to see this patient by Ludmila Torres for please see below.  I have seen Jena Ibarra and as you know his chief complaint is epigastric pain  Bloating and nausea and nausea with just eating a banana.    Coffee plain upsets her stomach.   Has had some pain in the right upper quadrant area but more in the epigastric area.  Also on the left upper quadrant.      HPI:  Patient is a 64 year old female  with complaints see above  The last episode the patient ate ? which started the symptoms  Patient has family history of gallbladder problems  Mother and sister  Brother had pancreatic cancer  Not eating makes the episode better.  Is taking gasx that helps and sometimes tums.       Review Of Systems  Respiratory: No shortness of breath, dyspnea on exertion, cough, or hemoptysis  Cardiovascular: negative  Gastrointestinal: nausea, constipation and diarrhea  Endocrine: negative  10 point review of systems done and all others are negative other than above.   /86   Pulse 86   Ht 1.676 m (5' 6\")   Wt 78.5 kg (173 lb)   BMI 27.92 kg/m      Past Medical History:   Diagnosis Date     Diverticulosis      History of colon polyps      Hyperlipidemia LDL goal < 160      Osteopenia        Past Surgical History:   Procedure Laterality Date     COLONOSCOPY       COLONOSCOPY WITH CO2 INSUFFLATION N/A 10/13/2014    Procedure: COLONOSCOPY WITH CO2 INSUFFLATION;  Surgeon: Jamal Beach MD;  Location: [facility]     COLONOSCOPY WITH CO2 INSUFFLATION N/A 11/14/2017    Procedure: COLONOSCOPY WITH CO2 INSUFFLATION;  COLON-RECTAL BLEEDING / BARUSYA;  Surgeon: Henok Jewell MD;  Location: [facility] OR     TUBAL LIGATION         Social History     Socioeconomic History     Marital status:      Spouse name: Not on file     Number of children: Not on file     Years of education: Not on file     Highest education level: Not on file   Occupational History     Employer: " [employer]   Social Needs     Financial resource strain: Not on file     Food insecurity:     Worry: Not on file     Inability: Not on file     Transportation needs:     Medical: Not on file     Non-medical: Not on file   Tobacco Use     Smoking status: Never Smoker     Smokeless tobacco: Never Used   Substance and Sexual Activity     Alcohol use: Yes     Comment: occasional     Drug use: No     Sexual activity: Yes     Partners: Male     Birth control/protection: Surgical     Comment: tubal ligation   Lifestyle     Physical activity:     Days per week: Not on file     Minutes per session: Not on file     Stress: Not on file   Relationships     Social connections:     Talks on phone: Not on file     Gets together: Not on file     Attends Worship service: Not on file     Active member of club or organization: Not on file     Attends meetings of clubs or organizations: Not on file     Relationship status: Not on file     Intimate partner violence:     Fear of current or ex partner: Not on file     Emotionally abused: Not on file     Physically abused: Not on file     Forced sexual activity: Not on file   Other Topics Concern     Parent/sibling w/ CABG, MI or angioplasty before 65F 55M? Not Asked   Social History Narrative     Not on file       No current outpatient medications on file.     Physical exam:  Patient able to get up on table without difficulty.  Head eyes, nose and mouth within normal limits.  No supraclavicular or cervical adenopathy palpated.  Thyroid within normal limits.  No carotid bruits auscultated.  Lungs are clear to auscultation  Heart is regular rate and rhythm with no murmur or thrills noted.  No costal vertebral angle tenderness noted.  Abdomen is abdomen is soft without significant tenderness, masses, organomegaly or guarding  bowel sounds are positive and no caput medusa noted.  Mild epigastric discomfort  No obvious hernias noted.  Easily palpable posterior tibial pulse or dorsalis pedis pulse  bilaterally.  Lower extremity edema is not present.  Skin warm and pink    Study Result     ULTRASOUND ABDOMEN LIMITED  4/11/2019 11:11 AM      HISTORY: Epigastric abdominal pain.     COMPARISON: None.     FINDINGS:  Liver is mildly fatty infiltrated as evidenced by a diffuse  increase in echogenicity without focal lesions. Gallbladder  demonstrates cholelithiasis without evidence for cholecystitis.  Extrahepatic bile duct is normal in diameter. Pancreas is normal where  visualized. Right kidney is normal in size. There is no  hydronephrosis. Tiny simple renal cysts noted measuring 1 cm.                                                                      IMPRESSION:    1. Cholelithiasis without evidence for cholecystitis.   2. Fatty liver.      JASON MAC MD       Labs show:  Exam Date Exam Time Accession # Results    4/9/19  4:35 PM [id number]    Component Value Flag Ref Range Units Status Collected Lab   Bilirubin Direct 0.1   0.0 - 0.2 mg/dL Final 04/09/2019  4:35 PM [facility]   Bilirubin Total 0.6   0.2 - 1.3 mg/dL Final 04/09/2019  4:35 PM [facility]   Albumin 4.2   3.4 - 5.0 g/dL Final 04/09/2019  4:35 PM [facility]   Protein Total 7.6   6.8 - 8.8 g/dL Final 04/09/2019  4:35 PM [facility]   Alkaline Phosphatase 73   40 - 150 U/L Final 04/09/2019  4:35 PM [facility]   ALT 27   0 - 50 U/L Final 04/09/2019  4:35 PM [facility]   AST 17   0 - 45 U/L Final 04/09/2019  4:35 PM [facility]   Lab and Collection     Lipase [LAB99] (Order [id number])   Exam Information     Exam Date Exam Time Accession # Results    4/9/19  4:35 PM [id number]    Component Value Flag Ref Range Units Status Collected Lab   Lipase 95   73 - 393 U/L Final 04/09/2019  4:35 PM          Utrasound shows + stones -dialted ducts -  thickening fo the gallbladder .   Patient denies any symptoms of common bile duct stone, or pancreatitis.  Last colonoscopy was no polyps  In 2017  Assessment:  cholelithiasis with right upper quadrant pain but mostly epigastric pain and happens with nausea  and bloating and not from fatty foods.   In fact ate a hamburger and no problems.  So recommend an EGD.  Has a grandson and his mother living with them and this is somewhat stressful.   Did do the Mylanta lidocaine drink and that made her epigastric pain worse.   Will recheck liver function test and lipase.    And will start on prilosec.   Had a tubal ligation and looks like they went above the umbilicus so may need right upper quadrant trocar.    Plan to do  If needed a laparoscopic cholecystectomy possible open.  But right now her stomach may be more of the problem.   We discussed typical gallbladder symptoms and I drew out a diagram to help discuss how the gallbladder works and what are the symptoms of a gallbladder attack.  I also discussed gallbladder surgery with risks and complications of surgery including bleeding, hernias, damage to bowel , damage to the bile ducts, risks of anesthesia.  If I get into bleeding that I can not control laparoscopicly, if I get a hole in the bowel or if the anatomy of the bile ducts does not look normal I may have to convert to an open procedure.  Discussed what to expect afterwards, the use of the abdominal binder and no lifting greater than 20 pounds for 3 weeks after surgery. That if done laparoscopically will plan on her going home the same day, but if I have to convert to an open procedure will have patient admitted to the hospital.      Time spent with the patient with greater that 50% of the time in discussion was 33 minutes.    Jamal Beach MD

## 2019-07-16 NOTE — LETTER
"    7/16/2019         RE: Jena Ibarra  [address] [place] [address]        Dear Colleague,    Thank you for referring your patient, Jena Ibarra, to the [facility]. Please see a copy of my visit note below.    Dear Dr. Torres [facility]  I was asked to see this patient by [facility] for please see below.  I have seen Jena Ibarra and as you know his chief complaint is epigastric pain  Bloating and nausea and nausea with just eating a banana.    Coffee plain upsets her stomach.   Has had some pain in the right upper quadrant area but more in the epigastric area.  Also on the left upper quadrant.      HPI:  Patient is a 64 year old female  with complaints see above  The last episode the patient ate ? which started the symptoms  Patient has family history of gallbladder problems  Mother and sister  Brother had pancreatic cancer  Not eating makes the episode better.  Is taking gasx that helps and sometimes tums.       Review Of Systems  Respiratory: No shortness of breath, dyspnea on exertion, cough, or hemoptysis  Cardiovascular: negative  Gastrointestinal: nausea, constipation and diarrhea  Endocrine: negative  10 point review of systems done and all others are negative other than above.   /86   Pulse 86   Ht 1.676 m (5' 6\")   Wt 78.5 kg (173 lb)   BMI 27.92 kg/m       Past Medical History:   Diagnosis Date     Diverticulosis      History of colon polyps      Hyperlipidemia LDL goal < 160      Osteopenia        Past Surgical History:   Procedure Laterality Date     COLONOSCOPY       COLONOSCOPY WITH CO2 INSUFFLATION N/A 10/13/2014    Procedure: COLONOSCOPY WITH CO2 INSUFFLATION;  Surgeon: Jamal Beach MD;  Location: [facility]     COLONOSCOPY WITH CO2 INSUFFLATION N/A 11/14/2017    Procedure: COLONOSCOPY WITH CO2 INSUFFLATION;  COLON-RECTAL BLEEDING / BARUSYA;  Surgeon: Henok Jewell MD;  Location: [facility]     TUBAL LIGATION         Social " History     Socioeconomic History     Marital status:      Spouse name: Not on file     Number of children: Not on file     Years of education: Not on file     Highest education level: Not on file   Occupational History     Employer: [employer]   Social Needs     Financial resource strain: Not on file     Food insecurity:     Worry: Not on file     Inability: Not on file     Transportation needs:     Medical: Not on file     Non-medical: Not on file   Tobacco Use     Smoking status: Never Smoker     Smokeless tobacco: Never Used   Substance and Sexual Activity     Alcohol use: Yes     Comment: occasional     Drug use: No     Sexual activity: Yes     Partners: Male     Birth control/protection: Surgical     Comment: tubal ligation   Lifestyle     Physical activity:     Days per week: Not on file     Minutes per session: Not on file     Stress: Not on file   Relationships     Social connections:     Talks on phone: Not on file     Gets together: Not on file     Attends Hindu service: Not on file     Active member of club or organization: Not on file     Attends meetings of clubs or organizations: Not on file     Relationship status: Not on file     Intimate partner violence:     Fear of current or ex partner: Not on file     Emotionally abused: Not on file     Physically abused: Not on file     Forced sexual activity: Not on file   Other Topics Concern     Parent/sibling w/ CABG, MI or angioplasty before 65F 55M? Not Asked   Social History Narrative     Not on file       No current outpatient medications on file.     Physical exam:  Patient able to get up on table without difficulty.  Head eyes, nose and mouth within normal limits.  No supraclavicular or cervical adenopathy palpated.  Thyroid within normal limits.  No carotid bruits auscultated.  Lungs are clear to auscultation  Heart is regular rate and rhythm with no murmur or thrills noted.  No costal vertebral angle tenderness noted.  Abdomen is abdomen is  soft without significant tenderness, masses, organomegaly or guarding  bowel sounds are positive and no caput medusa noted.  Mild epigastric discomfort  No obvious hernias noted.  Easily palpable posterior tibial pulse or dorsalis pedis pulse bilaterally.  Lower extremity edema is not present.  Skin warm and pink    Study Result     ULTRASOUND ABDOMEN LIMITED  4/11/2019 11:11 AM      HISTORY: Epigastric abdominal pain.     COMPARISON: None.     FINDINGS:  Liver is mildly fatty infiltrated as evidenced by a diffuse  increase in echogenicity without focal lesions. Gallbladder  demonstrates cholelithiasis without evidence for cholecystitis.  Extrahepatic bile duct is normal in diameter. Pancreas is normal where  visualized. Right kidney is normal in size. There is no  hydronephrosis. Tiny simple renal cysts noted measuring 1 cm.                                                                      IMPRESSION:    1. Cholelithiasis without evidence for cholecystitis.   2. Fatty liver.      JASON MAC MD       Labs show:  Exam Date Exam Time Accession # Results    4/9/19  4:35 PM [id number]    Component Value Flag Ref Range Units Status Collected Lab   Bilirubin Direct 0.1   0.0 - 0.2 mg/dL Final 04/09/2019  4:35 PM [facility]   Bilirubin Total 0.6   0.2 - 1.3 mg/dL Final 04/09/2019  4:35 PM [facility]   Albumin 4.2   3.4 - 5.0 g/dL Final 04/09/2019  4:35 PM [facility]   Protein Total 7.6   6.8 - 8.8 g/dL Final 04/09/2019  4:35 PM [facility]   Alkaline Phosphatase 73   40 - 150 U/L Final 04/09/2019  4:35 PM [facility]   ALT 27   0 - 50 U/L Final 04/09/2019  4:35 PM [facility]   AST 17   0 - 45 U/L Final 04/09/2019  4:35 PM [facility]   Lab and Collection     Lipase [LAB99] (Order [id number])   Exam Information     Exam Date Exam Time Accession # Results    4/9/19  4:35 PM [id number]    Component Value Flag Ref Range Units Status Collected Lab   Lipase 95   73 - 393 U/L Final 04/09/2019  4:35 PM          Utrasound shows + stones -dialted ducts -  thickening fo  the gallbladder .   Patient denies any symptoms of common bile duct stone, or pancreatitis.  Last colonoscopy was no polyps  In 2017  Assessment:  cholelithiasis with right upper quadrant pain but mostly epigastric pain and happens with nausea and bloating and not from fatty foods.   In fact ate a hamburger and no problems.  So recommend an EGD.  Has a grandson and his mother living with them and this is somewhat stressful.   Did do the Mylanta lidocaine drink and that made her epigastric pain worse.   Will recheck liver function test and lipase.    And will start on prilosec.   Had a tubal ligation and looks like they went above the umbilicus so may need right upper quadrant trocar.    Plan to do  If needed a laparoscopic cholecystectomy possible open.  But right now her stomach may be more of the problem.   We discussed typical gallbladder symptoms and I drew out a diagram to help discuss how the gallbladder works and what are the symptoms of a gallbladder attack.  I also discussed gallbladder surgery with risks and complications of surgery including bleeding, hernias, damage to bowel , damage to the bile ducts, risks of anesthesia.  If I get into bleeding that I can not control laparoscopicly, if I get a hole in the bowel or if the anatomy of the bile ducts does not look normal I may have to convert to an open procedure.  Discussed what to expect afterwards, the use of the abdominal binder and no lifting greater than 20 pounds for 3 weeks after surgery. That if done laparoscopically will plan on her going home the same day, but if I have to convert to an open procedure will have patient admitted to the hospital.      Time spent with the patient with greater that 50% of the time in discussion was 33 minutes.    Jamal Beach MD      Again, thank you for allowing me to participate in the care of your patient.        Sincerely,        Jamal Beach MD

## 2019-07-16 NOTE — PATIENT INSTRUCTIONS
ULTRASOUND ABDOMEN LIMITED  4/11/2019 11:11 AM      HISTORY: Epigastric abdominal pain.     COMPARISON: None.     FINDINGS:  Liver is mildly fatty infiltrated as evidenced by a diffuse  increase in echogenicity without focal lesions. Gallbladder  demonstrates cholelithiasis without evidence for cholecystitis.  Extrahepatic bile duct is normal in diameter. Pancreas is normal where  visualized. Right kidney is normal in size. There is no  hydronephrosis. Tiny simple renal cysts noted measuring 1 cm.                                                                      IMPRESSION:    1. Cholelithiasis without evidence for cholecystitis.   2. Fatty liver.      JASON MAC MD       Labs show:  Exam Date Exam Time Accession # Results    4/9/19  4:35 PM [id number]    Component Value Flag Ref Range Units Status Collected Lab   Bilirubin Direct 0.1   0.0 - 0.2 mg/dL Final 04/09/2019  4:35 PM MG   Bilirubin Total 0.6   0.2 - 1.3 mg/dL Final 04/09/2019  4:35 PM MG   Albumin 4.2   3.4 - 5.0 g/dL Final 04/09/2019  4:35 PM MG   Protein Total 7.6   6.8 - 8.8 g/dL Final 04/09/2019  4:35 PM MG   Alkaline Phosphatase 73   40 - 150 U/L Final 04/09/2019  4:35 PM MG   ALT 27   0 - 50 U/L Final 04/09/2019  4:35 PM MG   AST 17   0 - 45 U/L Final 04/09/2019  4:35 PM MG   Lab and Collection     Lipase [LAB99] (Order [id number])   Exam Information     Exam Date Exam Time Accession # Results    4/9/19  4:35 PM [id number]    Component Value Flag Ref Range Units Status Collected Lab   Lipase 95   73 - 393 U/L Final 04/09/2019  4:35 PM          Utrasound shows + stones -dialted ducts -  thickening fo the gallbladder .   Patient denies any symptoms of common bile duct stone, or pancreatitis.  Last colonoscopy was no polyps  In 2017  Assessment:  cholelithiasis with right upper quadrant pain but mostly epigastric pain and happens with nausea and bloating and not from fatty foods.   In fact ate a hamburger and no problems.  So  recommend an EGD.  Has a grandson and his mother living with them and this is somewhat stressful.   Did do the Mylanta lidocaine drink and that made her epigastric pain worse.   Will recheck liver function test and lipase.    And will start on prilosec.   Had a tubal ligation and looks like they went above the umbilicus so may need right upper quadrant trocar.    Plan to do  If needed a laparoscopic cholecystectomy possible open.  But right now her stomach may be more of the problem.   We discussed typical gallbladder symptoms and I drew out a diagram to help discuss how the gallbladder works and what are the symptoms of a gallbladder attack.  I also discussed gallbladder surgery with risks and complications of surgery including bleeding, hernias, damage to bowel , damage to the bile ducts, risks of anesthesia.  If I get into bleeding that I can not control laparoscopicly, if I get a hole in the bowel or if the anatomy of the bile ducts does not look normal I may have to convert to an open procedure.  Discussed what to expect afterwards, the use of the abdominal binder and no lifting greater than 20 pounds for 3 weeks after surgery. That if done laparoscopically will plan on her going home the same day, but if I have to convert to an open procedure will have patient admitted to the hospital.    LAPAROSCOPIC CHOLECYSTECTOMY DISCHARGE INSTRUCTIONS  DR. DOMENICA HUSSEIN    1. You may resume your regular diet when you feel you are ready to. DO NOT drink alcoholic beverages for 24 hours of while you are taking prescription medication.    2. Limit your activities for the first 48 hours. Gradually, increase them as tolerated. You may use stairs. I encourage you to walk as tolerated. No lifting greater that 20 pounds for 3 weeks.    3. You will have some discomfort at the incision sites. This is expected. This should improve over the next 2-3 days. Ice and pain medication will help with this pain. Use prescribed pain  medication as instructed.    4. Bruising and mild swelling is normal after surgery. The area below and around the incision(s) will be hard and elevated. This is normal. I call it the healing ridge. This will resolve slowly over the next several months. If you feel the pain is increasing and cannot explain it by increasing activity please call us at [phone].    5. The dressing will often have some blood on it. You may shower 24 hours after surgery. Clean gently over incision site. If clear plastic covering or steri-strip comes off and there is still some bleeding or drainage then cover with gauze or band-aid. If there is no bleeding, there is no reason to cover site. The abdominal binder may be removed after 24 hours after surgery. You may continue to wear it however for comfort. I suggest  you wear an old t-shirt under the abdominal binder for a more comfortable wear.    6. Avoid Aspirin for the first 72 hours after the procedure. This medication may increase the tendency to bleed.    7. Use the following medications (in addition to your normal meds) as shown:  a. Percocet 5 mg 1-2 every 6 hours as needed for pain. This contains 500 mg of Tylenol (acetaminophen) per tablet. Please do not take more than 4 grams of Tylenol (acetaminophen) per day. For example, you may take 1 Percocet and 1 Tylenol, or 2 Percocet and no Tylenol, or 2 Tylenol and no Percocet every 6 hours.  b. Tylenol (acetaminophen) 500 mg every 6 hours as needed for pain. Do not take more than 1000 mg every 6 hours (see above).  c. Motrin (ibuprofen) 200-800 mg every 6 hours as needed for pain. Take with food.    8. Notify Dr. Beach's clinic at [phone] if:    Your discomfort is not relieved by your pain medication.    You have signs of infection such as temperature above 100.5 degrees orally, chills, or increasing daily discomfort.    Incision site is becoming more red and/or there is purulent drainage.    You have questions or  concerns.    9. Please call [phone] to schedule a follow up appointment in 2 weeks.    10. When taking narcotics (pain medication more than Tylenol [acetaminophen] and Motrin [ibuprofen]) it is important to keep your stools soft to avoid constipation and pain with straining. This is best done by drinking fluids (non-alcoholic and non-caffeinated) and taking a stool softener (i.e. Metamucil or milk of magnesia). You may be able to use non-narcotics for pain relief especially by the 3rd post- operative day. Tylenol 500 mg  every 6 hours and/or Motrin (ibuprofen) 200-800 mg every 6 hours. Please do not take more than 4 grams of Tylenol (acetaminophen) per day. Remember your Percocet does have Tylenol (acetaminophen) already in it. Please take Motrin (ibuprofen) with food to help protect the stomach. If you have a history of stomach ulcers or stomach problems, do not take Motrin (ibuprofen).    11. Do not drive or operate heavy machinery for 24 hours after surgery or when taking narcotics. You may resume driving when feel that you can safely avoid an accident and are not taking narcotics. This is usually 5 to 7 days after surgery. You should not be alone for 24 hours after surgery.    12. Bile is important for breaking down fatty foods. Excessive fatty foods may cause diarrhea. Please be aware of this when you first start eating fatty foods.    13. Have milk of magnesia available at home so that when you take the pain medications you take 1-2 teaspoons a day,  to help reduce problems with constipation.

## 2019-07-25 ENCOUNTER — HOSPITAL ENCOUNTER (OUTPATIENT)
Facility: AMBULATORY SURGERY CENTER | Age: 64
Discharge: HOME | End: 2019-07-25
Attending: SURGERY | Admitting: SURGERY
Payer: COMMERCIAL

## 2019-07-25 VITALS
TEMPERATURE: 98 F | RESPIRATION RATE: 18 BRPM | OXYGEN SATURATION: 100 % | HEART RATE: 62 BPM | SYSTOLIC BLOOD PRESSURE: 111 MMHG | DIASTOLIC BLOOD PRESSURE: 70 MMHG

## 2019-07-25 DIAGNOSIS — Z80.0 FAMILY HISTORY OF PANCREATIC CANCER: ICD-10-CM

## 2019-07-25 DIAGNOSIS — R10.84 ABDOMINAL PAIN, GENERALIZED: Primary | ICD-10-CM

## 2019-07-25 LAB — UPPER GI ENDOSCOPY: NORMAL

## 2019-07-25 PROCEDURE — 88305 TISSUE EXAM BY PATHOLOGIST: CPT | Mod: 26 | Performed by: PATHOLOGY

## 2019-07-25 PROCEDURE — G8918 PT W/O PREOP ORDER IV AB PRO: HCPCS

## 2019-07-25 PROCEDURE — 43239 EGD BIOPSY SINGLE/MULTIPLE: CPT

## 2019-07-25 PROCEDURE — 43239 EGD BIOPSY SINGLE/MULTIPLE: CPT | Performed by: SURGERY

## 2019-07-25 PROCEDURE — G8907 PT DOC NO EVENTS ON DISCHARG: HCPCS

## 2019-07-25 RX ORDER — LIDOCAINE 40 MG/G
CREAM TOPICAL
Status: DISCONTINUED | OUTPATIENT
Start: 2019-07-25 | End: 2019-07-26 | Stop reason: HOSPADM

## 2019-07-25 RX ORDER — FENTANYL CITRATE 50 UG/ML
INJECTION, SOLUTION INTRAMUSCULAR; INTRAVENOUS PRN
Status: DISCONTINUED | OUTPATIENT
Start: 2019-07-25 | End: 2019-07-25 | Stop reason: HOSPADM

## 2019-07-25 NOTE — LETTER
[facility]           [address] [place] [address]  Jena Ibarra  [address] [place] [address]  July 30, 2019    Dear Jena,   This letter is to inform you of the results of your pathology report on your upper endoscopy (EGD).   If you do have further questions please don t hesitate to call my assistant at [phone].  We do not have someone answering the phone all the time at my assistants number so if leave a message it may take a day or so to get back to you.  So if more urgent then call the below number.    To make an appointment call [phone]:  Your pathology report was:  Normal, please follow up by having a repeat upper endoscopy (EGD), if your symptoms worsen.        If you have questions, please contact your primary care doctor.      Sincerely,         Jamal Beach M.D.

## 2019-07-25 NOTE — DISCHARGE INSTRUCTIONS
If you are not doing better with just the Prilosec we may need to do a ct scan of your abdomen to make sure that there is nothing else going on.    Your liver function test and lipase we did was normal.    Gallbladder can be tricky, but you know what the usual symptoms area and that fatty food is the culprit.   Please call Brittanie's number at  if getting worse or want to get the ct scan ordered.    Please follow up with me if you think the symptoms are more consistant with gallbladder problems.   Talked with patient so will get ct scan and encouraged her to try :  At first I would recommend that you take magnesium citrate one bottle and drink it cold.  Take 1/2 the bottle and then 3 hours later drink the rest.  Drink plenty of water or juice with it as it will steal fluids from you.  You can also take some milk of magnesia 1-2 table spoons several hours before taking the magnesium citrate.  You will usually have results in 4-6 hours so do not plan on going anywhere for 8  Or more hours after starting the prep.  Then on a daily basis take one of the above fiber supplements and take them daily.

## 2019-07-29 LAB — COPATH REPORT: NORMAL

## 2019-07-30 ENCOUNTER — ANCILLARY PROCEDURE (OUTPATIENT)
Dept: CT IMAGING | Facility: CLINIC | Age: 64
End: 2019-07-30
Attending: SURGERY
Payer: COMMERCIAL

## 2019-07-30 DIAGNOSIS — Q45.3 PANCREAS DIVISUM: ICD-10-CM

## 2019-07-30 DIAGNOSIS — R10.13 EPIGASTRIC PAIN: Primary | ICD-10-CM

## 2019-07-30 DIAGNOSIS — Z80.0 FAMILY HISTORY OF PANCREATIC CANCER: ICD-10-CM

## 2019-07-30 DIAGNOSIS — R10.84 ABDOMINAL PAIN, GENERALIZED: ICD-10-CM

## 2019-07-30 PROCEDURE — 74177 CT ABD & PELVIS W/CONTRAST: CPT | Performed by: RADIOLOGY

## 2019-07-30 RX ORDER — IOPAMIDOL 755 MG/ML
105 INJECTION, SOLUTION INTRAVASCULAR ONCE
Status: COMPLETED | OUTPATIENT
Start: 2019-07-30 | End: 2019-07-30

## 2019-07-30 RX ORDER — IOPAMIDOL 755 MG/ML
105 INJECTION, SOLUTION INTRAVASCULAR ONCE
Status: DISCONTINUED | OUTPATIENT
Start: 2019-07-30 | End: 2019-07-30

## 2019-07-30 RX ADMIN — IOPAMIDOL 105 ML: 755 INJECTION, SOLUTION INTRAVASCULAR at 09:13

## 2019-07-31 ENCOUNTER — TELEPHONE (OUTPATIENT)
Dept: GASTROENTEROLOGY | Facility: CLINIC | Age: 64
End: 2019-07-31

## 2019-07-31 NOTE — TELEPHONE ENCOUNTER
[facility] Call Center    Phone Message    May a detailed message be left on voicemail: yes    Reason for Call: Other: Patient is requesting an appt for an ERCP test. Unsure if we offer that here. Please advise.     Action Taken: Message routed to:  Adult Clinics: Gastroenterology (GI) p [phone]

## 2019-08-01 NOTE — TELEPHONE ENCOUNTER
Ric Jaeger MD  You 32 minutes ago (8:48 AM)      Needs office consult, non-urgent.     ERCP can be arranged if needed but is not scheduled directly.       -Gume

## 2019-08-15 ENCOUNTER — OFFICE VISIT (OUTPATIENT)
Dept: GASTROENTEROLOGY | Facility: CLINIC | Age: 64
End: 2019-08-15
Payer: COMMERCIAL

## 2019-08-15 VITALS
HEART RATE: 66 BPM | HEIGHT: 66 IN | BODY MASS INDEX: 28.21 KG/M2 | SYSTOLIC BLOOD PRESSURE: 144 MMHG | WEIGHT: 175.5 LBS | DIASTOLIC BLOOD PRESSURE: 91 MMHG | OXYGEN SATURATION: 99 %

## 2019-08-15 DIAGNOSIS — R10.11 RUQ ABDOMINAL PAIN: Primary | ICD-10-CM

## 2019-08-15 PROCEDURE — 99204 OFFICE O/P NEW MOD 45 MIN: CPT | Performed by: INTERNAL MEDICINE

## 2019-08-15 RX ORDER — DICYCLOMINE HYDROCHLORIDE 10 MG/1
10 CAPSULE ORAL 3 TIMES DAILY PRN
Qty: 30 CAPSULE | Refills: 3 | Status: SHIPPED | OUTPATIENT
Start: 2019-08-15 | End: 2019-09-27

## 2019-08-15 ASSESSMENT — MIFFLIN-ST. JEOR: SCORE: 1352.81

## 2019-08-15 ASSESSMENT — PAIN SCALES - GENERAL: PAINLEVEL: NO PAIN (0)

## 2019-08-15 NOTE — PATIENT INSTRUCTIONS
Start taking bentyl as needed for abdominal pain and see if it helps. You can also take simethicone as needed.  Please schedule a HIDA scan.  Keep miralax on hand - take it if you don't have a BM after one day.  You can stop taking your omeprazole as it isn't helping.  Consider keeping a food journal and try to avoid foods if they seem to recurrently cause your symptoms.  Follow-up with me in a month.  Please let me know if your symptoms worsen or fail to improve.       Patient Education     Excess Gas  Certain foods produce gas when digested. In some people, these foods make an excessive amount of gas. This may cause bloating, burping, or increased gas passing through the rectum (flatulence).     Foods that cause gas  The following foods are more likely to cause this problem. Limit them, or remove them from your diet:    Broccoli    Cauliflower    [place] sprouts    Cabbage    Cooked dried beans    Fizzy (carbonated) drinks, such as sparkling water, soda, beer, and champagne  Other causes  Other causes of excess gas include:    Eating too fast or talking while you chew. This may cause you to swallow air. This increases the amount of gas in your stomach. And it may make your symptoms worse. Chew each mouthful completely before you swallow. Take your time.    Chewing on gum or sucking on hard candy. These cause you to swallow more often. And some of what you are swallowing is air. This leads to more gas in your stomach. Avoid chewing gum and hard candy.    Overeating. This may increase the feeling of being bloated and cause more gas. When you are full, stop eating.     Being constipated. This can increase the amount of normal intestinal gas. Avoid constipation by getting more fiber in your diet. Good sources of fiber include whole-grain cereal, fresh vegetables (except those in the above list), and fresh fruits. High-fiber foods absorb water and carry it out of the body. When adding more fiber to your diet, you also  need to drink more water. You should drink at least 8, 8-ounce glasses of water (2 quarts) per day.  Date Last Reviewed: 8/1/2016 2000-2018 [employer]. [address] [place] [address], [place] [address]. All rights reserved. This information is not intended as a substitute for professional medical care. Always follow your healthcare professional's instructions.

## 2019-08-15 NOTE — PROGRESS NOTES
GI CLINIC VISIT    CC/REFERRING MD:  Jamal Beach  REASON FOR CONSULTATION:  Abdominal pain/bloating.  Jamal Beach for       HPI    Patient presenting with gas and bloating which has been going on for the last few months at least. Pain initially started out being more diffuse in nature but on a recent vacation last week she noticed that it seemed to be located more in the RUQ to the point that she wasn't able to sleep on her right side while on a recent vacation. Pain can last for days at a time.  Can happen with anything she eats or drinks, including just drinking water. Doesn't seem to be related to eating fatty or heavy foods - said it is more likely to happen with eating fruits like bananas and strawberries.  Has noticed some association with constipation in the symptoms seem to be worse when she hasn't had a BM and may improve a little when she finally uses the bathroom.  Some improvement with simethicone.  Has been having more regular BMs lately but a few weeks ago had to take a dose of milk of magnesia.  Tried taking metamucil but it made her bloating much worse.  No history of pancreatitis. Smoked for about a year a long time ago.  Rare EtOH use.    Has been following with surgery regarding these symptoms - had an US which revealed gallstones and fatty liver with normal CBD.  Had CT which again revealed cholelithiasis as well as pancreas divisum and a small lupis-ampullary diverticulum and mildly atrophic pancreas. EGD with gastric biopsies were normal.  Patient was started on omeprazole but doesn't think it has helped.    Brother had was diagnosed with pancreatic cancer around age 53. No other family history of pancreatic cancer.  Mother with kidney cancer.    ROS:    No fevers or chills  No weight loss  No blurry vision, double vision or change in vision  No sore throat  No lymphadenopathy  No headache, paraesthesias, or weakness in a limb  No shortness of breath or wheezing  No chest  pain or pressure  No arthralgias or myalgias  No rashes or skin changes  No odynophagia or dysphagia  No BRBPR, hematochezia, melena  No dysuria, frequency or urgency  No hot/cold intolerance or polyria  No anxiety or depression    PROBLEM LIST  Patient Active Problem List    Diagnosis Date Noted     Atrophic vaginitis 12/01/2014     Priority: Medium     Radial styloid tenosynovitis 04/18/2013     Priority: Medium     CTS (carpal tunnel syndrome) 04/18/2013     Priority: Medium     Advanced directives, counseling/discussion 04/17/2013     Priority: Medium     Advance Care Planning: Pt does have living well and will bring in a copy. Valarie Tovarshawn DAWN               Wrist pain 12/17/2012     Priority: Medium     CARDIOVASCULAR SCREENING; LDL GOAL LESS THAN 160 10/31/2010     Priority: Medium       PERTINENT PAST MEDICAL HISTORY:  Past Medical History:   Diagnosis Date     Diverticulosis      History of colon polyps      Hyperlipidemia LDL goal < 160      Osteopenia        PREVIOUS SURGERIES:  Past Surgical History:   Procedure Laterality Date     COLONOSCOPY       COLONOSCOPY WITH CO2 INSUFFLATION N/A 10/13/2014    Procedure: COLONOSCOPY WITH CO2 INSUFFLATION;  Surgeon: Jamal Beach MD;  Location: [facility] OR     COLONOSCOPY WITH CO2 INSUFFLATION N/A 11/14/2017    Procedure: COLONOSCOPY WITH CO2 INSUFFLATION;  COLON-RECTAL BLEEDING / BARUSYA;  Surgeon: Henok Jewell MD;  Location: [facility] OR     COMBINED ESOPHAGOSCOPY, GASTROSCOPY, DUODENOSCOPY (EGD) WITH CO2 INSUFFLATION N/A 7/25/2019    Procedure: ESOPHAGOGASTRODUODENOSCOPY, WITH CO2 INSUFFLATION;  Surgeon: Jamal Beach MD;  Location: [facility] OR     ESOPHAGOSCOPY, GASTROSCOPY, DUODENOSCOPY (EGD), COMBINED N/A 7/25/2019    Procedure: Esophagogastroduodenoscopy, With Biopsy;  Surgeon: Jamal Beach MD;  Location: [facility] OR     TUBAL LIGATION         PREVIOUS ENDOSCOPY:    EGD - July - normal - including biopsies    Colonoscopy 2017 - diverticulosis and  hemorrhoids.      ALLERGIES:   No Known Allergies    PERTINENT MEDICATIONS:    Current Outpatient Medications:      omeprazole (PRILOSEC OTC) 20 MG EC tablet, Take 1 tablet (20 mg) by mouth daily, Disp: 30 tablet, Rfl: 11    SOCIAL HISTORY:  Social History     Socioeconomic History     Marital status:      Spouse name: Not on file     Number of children: Not on file     Years of education: Not on file     Highest education level: Not on file   Occupational History     Employer: [employer] [place]   Social Needs     Financial resource strain: Not on file     Food insecurity:     Worry: Not on file     Inability: Not on file     Transportation needs:     Medical: Not on file     Non-medical: Not on file   Tobacco Use     Smoking status: Never Smoker     Smokeless tobacco: Never Used   Substance and Sexual Activity     Alcohol use: Yes     Comment: occasional     Drug use: No     Sexual activity: Yes     Partners: Male     Birth control/protection: Surgical     Comment: tubal ligation   Lifestyle     Physical activity:     Days per week: Not on file     Minutes per session: Not on file     Stress: Not on file   Relationships     Social connections:     Talks on phone: Not on file     Gets together: Not on file     Attends Nondenominational service: Not on file     Active member of club or organization: Not on file     Attends meetings of clubs or organizations: Not on file     Relationship status: Not on file     Intimate partner violence:     Fear of current or ex partner: Not on file     Emotionally abused: Not on file     Physically abused: Not on file     Forced sexual activity: Not on file   Other Topics Concern     Parent/sibling w/ CABG, MI or angioplasty before 65F 55M? Not Asked   Social History Narrative     Not on file       FAMILY HISTORY:  Family History   Problem Relation Age of Onset     Cancer Mother         kidney     Diabetes Mother      Heart Disease Mother      Lipids Mother      Hypertension Mother      Colon  Polyps Mother          benign     Arthritis Father      Lipids Father      Hypertension Father      Osteoporosis Father      Arthritis Sister      Neurologic Disorder Sister         seizures     Cancer Brother         pancreatic     Breast Cancer No family hx of        Past/family/social history reviewed and no changes    PHYSICAL EXAMINATION:  Constitutional: aaox3, cooperative, pleasant, not dyspneic/diaphoretic, no acute distress  Vitals reviewed: Wt 79.6 kg (175 lb 8 oz)   BMI 28.33 kg/m    Wt:   Wt Readings from Last 2 Encounters:   08/15/19 79.6 kg (175 lb 8 oz)   07/16/19 78.5 kg (173 lb)      Eyes: Sclera anicteric/injected  Ears/nose/mouth/throat: Normal oropharynx without ulcers or exudate, mucus membranes moist, hearing intact  Neck: supple, thyroid normal size  CV: No edema  Respiratory: Unlabored breathing  Lymph: No axillary, submandibular, supraclavicular or inguinal lymphadenopathy  Abd:  Nondistended, +bs, no hepatosplenomegaly, nontender, no peritoneal signs  Skin: warm, perfused, no jaundice  Psych: Normal affect  MSK: Normal gait      PERTINENT STUDIES:  Most recent CBC:  Recent Labs   Lab Test 04/09/19  1635 10/13/17  1554   WBC 6.6 5.0   HGB 13.6 12.2   HCT 41.1 37.0    230     Most recent hepatic panel:  Recent Labs   Lab Test 07/16/19  1015 04/09/19  1635   ALT 39 27   AST 23 17     Most recent creatinine:  Recent Labs   Lab Test 10/13/17  1554 09/04/14  1349   CR 0.83 0.93       ASSESSMENT/PLAN:  1. Post-prandial abdominal pain/bloating - differential includes symptomatic cholelithiasis, irritable bowel, constipation related, etc.  No evidence of PUD, gastritis or GERD on recent EGD.  Less likely pancreatic etiology, although possible.  LFTs wnl on recent labs.  Lipase low normal.  Will give trial of bentyl. Can continue simethicone PRN.  Stop omeprazole as patient reports no improvement. Bowel regimen to prevent constipation. Will also obtain HIDA with [facility].    2.  Cholelithiasis    3. Pancreas divisum     4. Family history of pancreatic cancer in first degree relative + mildly atrophic pancreas on CT - will plan for EUS with Dr. Jaeger to better evaluate pancreatic parenchyma.    RTC 4 weeks    Thank you for this consultation.  It was a pleasure to participate in the care of this patient; please contact us with any further questions.  A total of 30 minutes, face to face, was spent with this patient, >50% of which was counseling regarding the above delineated issues.    This note was created with voice recognition software, and while reviewed for accuracy, typos may remain.

## 2019-08-15 NOTE — NURSING NOTE
"Jena Ibarra's goals for this visit include:   Chief Complaint   Patient presents with     Consult     ERCP test results        She requests these members of her care team be copied on today's visit information: yes    PCP: [facility]    Referring Provider:  Jamal Beach MD  [facility] [facility]  [place] [id number]    BP (!) 144/91   Pulse 66   Ht 1.676 m (5' 6\")   Wt 79.6 kg (175 lb 8 oz)   SpO2 99%   BMI 28.33 kg/m      Do you need any medication refills at today's visit? No    Kailyn Ang CMA      "

## 2019-08-16 ENCOUNTER — TELEPHONE (OUTPATIENT)
Dept: GASTROENTEROLOGY | Facility: CLINIC | Age: 64
End: 2019-08-16

## 2019-08-16 NOTE — TELEPHONE ENCOUNTER
Called patient as I would like her to have an EUS for further evaluation of pancreas with Dr. Jaeger.

## 2019-08-27 ENCOUNTER — OFFICE VISIT (OUTPATIENT)
Dept: FAMILY MEDICINE | Facility: CLINIC | Age: 64
End: 2019-08-27
Payer: COMMERCIAL

## 2019-08-27 VITALS
BODY MASS INDEX: 27.8 KG/M2 | RESPIRATION RATE: 18 BRPM | DIASTOLIC BLOOD PRESSURE: 86 MMHG | HEIGHT: 66 IN | WEIGHT: 173 LBS | SYSTOLIC BLOOD PRESSURE: 136 MMHG | HEART RATE: 73 BPM | OXYGEN SATURATION: 99 % | TEMPERATURE: 98.2 F

## 2019-08-27 DIAGNOSIS — Z01.818 PREOP GENERAL PHYSICAL EXAM: Primary | ICD-10-CM

## 2019-08-27 PROCEDURE — 93000 ELECTROCARDIOGRAM COMPLETE: CPT | Performed by: PHYSICIAN ASSISTANT

## 2019-08-27 PROCEDURE — 99214 OFFICE O/P EST MOD 30 MIN: CPT | Performed by: PHYSICIAN ASSISTANT

## 2019-08-27 ASSESSMENT — MIFFLIN-ST. JEOR: SCORE: 1341.47

## 2019-08-27 NOTE — PROGRESS NOTES
[facility] (formerly [facility])[3] ARI[place]  [address]  [place] [address]  [phone]  Dept: [phone]    PRE-OP EVALUATION:  Today's date: 2019    Jena Ibarra (: 1955) presents for pre-operative evaluation assessment as requested by Dr. Grayson.  She requires evaluation and anesthesia risk assessment prior to undergoing surgery/procedure for treatment of epigastric pain .    Proposed Surgery/ Procedure: ESOPHAGOGASTRODUODENOSCOPY, WITH ENDOSCOPIC US  Date of Surgery/ Procedure: 19  Time of Surgery/ Procedure: 815am  Hospital/Surgical Facility: [facility]  Fax number for surgical facility:   Primary Physician: Ludmila Torres  Type of Anesthesia Anticipated: to be determined    Patient has a Health Care Directive or Living Will:  YES     1. NO - Do you have a history of heart attack, stroke, stent, bypass or surgery on an artery in the head, neck, heart or legs?  2. NO - Do you ever have any pain or discomfort in your chest?  3. NO - Do you have a history of  Heart Failure?  4. NO - Are you troubled by shortness of breath when: walking on the level, up a slight hill or at night?  5. NO - Do you currently have a cold, bronchitis or other respiratory infection?  6. NO - Do you have a cough, shortness of breath or wheezing?  7. NO - Do you sometimes get pains in the calves of your legs when you walk?  8. NO - Do you or anyone in your family have previous history of blood clots?  9. NO - Do you or does anyone in your family have a serious bleeding problem such as prolonged bleeding following surgeries or cuts?  10. NO - Have you ever had problems with anemia or been told to take iron pills?  11. NO - Have you had any abnormal blood loss such as black, tarry or bloody stools, or abnormal vaginal bleeding?  12. NO - Have you ever had a blood transfusion?  13. YES - HAVE YOU OR ANY OF YOUR RELATIVES EVER HAD PROBLEMS WITH ANESTHESIA? Nausea and vomiting  14. NO - Do you have sleep  apnea, excessive snoring or daytime drowsiness?  15. NO - Do you have any prosthetic heart valves?  16. NO - Do you have prosthetic joints?  17. NO - Is there any chance that you may be pregnant?      HPI:     HPI related to upcoming procedure: ongoing TAHMINA abdominal pain and postprandial nausea and bloating.       See problem list for active medical problems.  Problems all longstanding and stable, except as noted/documented.  See ROS for pertinent symptoms related to these conditions.      MEDICAL HISTORY:     Patient Active Problem List    Diagnosis Date Noted     Atrophic vaginitis 12/01/2014     Priority: Medium     Radial styloid tenosynovitis 04/18/2013     Priority: Medium     CTS (carpal tunnel syndrome) 04/18/2013     Priority: Medium     Advanced directives, counseling/discussion 04/17/2013     Priority: Medium     Advance Care Planning: Pt does have living well and will bring in a copy. Sheppard Valarie DAWN               Wrist pain 12/17/2012     Priority: Medium     CARDIOVASCULAR SCREENING; LDL GOAL LESS THAN 160 10/31/2010     Priority: Medium      Past Medical History:   Diagnosis Date     Diverticulosis      History of colon polyps      Hyperlipidemia LDL goal < 160      Osteopenia      Past Surgical History:   Procedure Laterality Date     COLONOSCOPY       COLONOSCOPY WITH CO2 INSUFFLATION N/A 10/13/2014    Procedure: COLONOSCOPY WITH CO2 INSUFFLATION;  Surgeon: Jamal Beach MD;  Location:  OR     COLONOSCOPY WITH CO2 INSUFFLATION N/A 11/14/2017    Procedure: COLONOSCOPY WITH CO2 INSUFFLATION;  COLON-RECTAL BLEEDING / BARUSYA;  Surgeon: Henok Jewell MD;  Location: [facility] OR     COMBINED ESOPHAGOSCOPY, GASTROSCOPY, DUODENOSCOPY (EGD) WITH CO2 INSUFFLATION N/A 7/25/2019    Procedure: ESOPHAGOGASTRODUODENOSCOPY, WITH CO2 INSUFFLATION;  Surgeon: Jamal Beach MD;  Location: [facility] OR     ESOPHAGOSCOPY, GASTROSCOPY, DUODENOSCOPY (EGD), COMBINED N/A 7/25/2019    Procedure:  "Esophagogastroduodenoscopy, With Biopsy;  Surgeon: Jamal Beach MD;  Location: [facility] OR     TUBAL LIGATION       Current Outpatient Medications   Medication Sig Dispense Refill     dicyclomine (BENTYL) 10 MG capsule Take 1 capsule (10 mg) by mouth 3 times daily as needed (abdominal pain) 30 capsule 3     omeprazole (PRILOSEC OTC) 20 MG EC tablet Take 1 tablet (20 mg) by mouth daily 30 tablet 11     OTC products: None, except as noted above    No Known Allergies   Latex Allergy: NO    Social History     Tobacco Use     Smoking status: Never Smoker     Smokeless tobacco: Never Used   Substance Use Topics     Alcohol use: Yes     Comment: occasional     History   Drug Use No       REVIEW OF SYSTEMS:   Constitutional, neuro, ENT, endocrine, pulmonary, cardiac, gastrointestinal, genitourinary, musculoskeletal, integument and psychiatric systems are negative, except as otherwise noted.    EXAM:   /86   Pulse 73   Temp 98.2  F (36.8  C) (Tympanic)   Resp 18   Ht 1.676 m (5' 6\")   Wt 78.5 kg (173 lb)   SpO2 99%   BMI 27.92 kg/m      GENERAL APPEARANCE: healthy, alert and no distress     EYES: EOMI, PERRL     HENT: ear canals and TM's normal and nose and mouth without ulcers or lesions     NECK: no adenopathy, no asymmetry, masses, or scars and thyroid normal to palpation     RESP: lungs clear to auscultation - no rales, rhonchi or wheezes     CV: regular rates and rhythm, normal S1 S2, no S3 or S4 and no murmur, click or rub     ABDOMEN:  soft, nontender, no HSM or masses and bowel sounds normal     MS: extremities normal- no gross deformities noted, no evidence of inflammation in joints, FROM in all extremities.     SKIN: no suspicious lesions or rashes     NEURO: Normal strength and tone, sensory exam grossly normal, mentation intact and speech normal     PSYCH: mentation appears normal. and affect normal/bright     LYMPHATICS: No cervical adenopathy    DIAGNOSTICS:   EKG: appears normal, NSR, normal " axis, normal intervals, no acute ST/T changes c/w ischemia, no LVH by voltage criteria, unchanged from previous tracings    Recent Labs   Lab Test 04/09/19  1635 10/13/17  1554 09/04/14  1349   HGB 13.6 12.2 11.7    230 247   NA  --  140 140   POTASSIUM  --  4.0 4.1   CR  --  0.83 0.93        IMPRESSION:       The proposed surgical procedure is considered LOW risk.    REVISED CARDIAC RISK INDEX  The patient has the following serious cardiovascular risks for perioperative complications such as (MI, PE, VFib and 3  AV Block):  No serious cardiac risks  INTERPRETATION: 0 risks: Class I (very low risk - 0.4% complication rate)    The patient has the following additional risks for perioperative complications:  No identified additional risks        RECOMMENDATIONS:         --Patient is on no chronic medications    APPROVAL GIVEN to proceed with proposed procedure, without further diagnostic evaluation       Signed Electronically by: Gopi Dee PA-C    Copy of this evaluation report is provided to requesting physician.    [facility] Preop Guidelines    Revised Cardiac Risk Index

## 2019-08-30 ENCOUNTER — ANCILLARY PROCEDURE (OUTPATIENT)
Dept: NUCLEAR MEDICINE | Facility: CLINIC | Age: 64
End: 2019-08-30
Attending: INTERNAL MEDICINE
Payer: COMMERCIAL

## 2019-08-30 DIAGNOSIS — R10.11 RUQ ABDOMINAL PAIN: ICD-10-CM

## 2019-08-30 PROCEDURE — A9537 TC99M MEBROFENIN: HCPCS | Performed by: INTERNAL MEDICINE

## 2019-08-30 PROCEDURE — 78227 HEPATOBIL SYST IMAGE W/DRUG: CPT | Performed by: RADIOLOGY

## 2019-08-30 RX ORDER — KIT FOR THE PREPARATION OF TECHNETIUM TC 99M MEBROFENIN 45 MG/10ML
4.8-7.2 INJECTION, POWDER, LYOPHILIZED, FOR SOLUTION INTRAVENOUS ONCE
Status: COMPLETED | OUTPATIENT
Start: 2019-08-30 | End: 2019-08-30

## 2019-08-30 RX ADMIN — KIT FOR THE PREPARATION OF TECHNETIUM TC 99M MEBROFENIN 6 MCI.: 45 INJECTION, POWDER, LYOPHILIZED, FOR SOLUTION INTRAVENOUS at 08:40

## 2019-09-03 ENCOUNTER — TELEPHONE (OUTPATIENT)
Dept: SURGERY | Facility: CLINIC | Age: 64
End: 2019-09-03

## 2019-09-03 ENCOUNTER — OFFICE VISIT (OUTPATIENT)
Dept: SURGERY | Facility: CLINIC | Age: 64
End: 2019-09-03
Payer: COMMERCIAL

## 2019-09-03 VITALS
HEART RATE: 80 BPM | DIASTOLIC BLOOD PRESSURE: 77 MMHG | BODY MASS INDEX: 27.28 KG/M2 | TEMPERATURE: 97.9 F | WEIGHT: 169 LBS | SYSTOLIC BLOOD PRESSURE: 111 MMHG

## 2019-09-03 DIAGNOSIS — R94.8 ABNORMAL BILIARY HIDA SCAN: ICD-10-CM

## 2019-09-03 DIAGNOSIS — R10.11 RUQ ABDOMINAL PAIN: Primary | ICD-10-CM

## 2019-09-03 DIAGNOSIS — K80.20 GALLSTONES: ICD-10-CM

## 2019-09-03 PROCEDURE — 99214 OFFICE O/P EST MOD 30 MIN: CPT | Performed by: SURGERY

## 2019-09-03 ASSESSMENT — PAIN SCALES - GENERAL: PAINLEVEL: NO PAIN (0)

## 2019-09-03 NOTE — TELEPHONE ENCOUNTER
Type of surgery: lap marce possible open CPT code 71392  RUQ abdominal pain [R10.11]  - Primary       Gallstones [K80.20]       Abnormal biliary HIDA scan [R94.8]       [facility]  Date and time of surgery: 10-2-19  Surgeon: Dr. Beach  Pre-Op Appt Date: 9-27-19  Post-Op Appt Date: 10-15-19   Packet sent out: Yes  Pre-cert/Authorization completed: No prior auth required per [employer] list  Date: 09/03/2019    Sent to [facility] and was received. 09/06/2019    Insurance valid 10/01/2019

## 2019-09-03 NOTE — PATIENT INSTRUCTIONS
Patient had no real help from the Prilosec. Was seen by Dr. betancourt and ordered a HIDA scan.,    Patient has right upper quadrant pain now.   SPECIMEN(S):   A: Antral biopsies   B: Stomach body polyp biopsy     FINAL DIAGNOSIS:   A. Stomach, Antrum, Biopsy:   - Gastric antral and body mucosa with no significant inflammation   - No H. pylori like organisms identified on routine staining   - Negative for intestinal metaplasia or dysplasia     B. Stomach, Body, Polypectomy:   Fundic gland polyp     So I was thinking that she probably had gallbladder issues and had stones, but some of her symptoms did not add up to just the gallbladder.  So that is why we did the EGD.    Her ct scan showed. Pancrease divisum and family history of pancreatic cancer so she saw the  gastrointestinal  Doctors.  And is scheduled for an EUS later this month.  Her HIDA scan shows poor gallbladder function.  So would recommend doing the laparoscopic cholecystectomy possible open.   But the patient and I agree that doing the EUS first would be best.      Utrasound shows + stones -dialted ducts -  thickening fo the gallbladder .   Patient denies any symptoms of common bile duct stone, or pancreatitis.  Last colonoscopy was no polyps  In 2017  Assessment:  cholelithiasis with right upper quadrant pain but mostly epigastric pain and happens with nausea and bloating and not from fatty foods.   In fact ate a hamburger and no problems.  So recommend an EGD.  Has a grandson and his mother living with them and this is somewhat stressful.   Did do the Mylanta lidocaine drink and that made her epigastric pain worse.   Will recheck liver function test and lipase.    And will start on prilosec.   Had a tubal ligation and looks like they went above the umbilicus so may need right upper quadrant trocar.    Plan to do  If needed a laparoscopic cholecystectomy possible open.  But right now her stomach may be more of the problem.   We discussed typical  gallbladder symptoms and I drew out a diagram to help discuss how the gallbladder works and what are the symptoms of a gallbladder attack.  I also discussed gallbladder surgery with risks and complications of surgery including bleeding, hernias, damage to bowel , damage to the bile ducts, risks of anesthesia.  If I get into bleeding that I can not control laparoscopicly, if I get a hole in the bowel or if the anatomy of the bile ducts does not look normal I may have to convert to an open procedure.  Discussed what to expect afterwards, the use of the abdominal binder and no lifting greater than 20 pounds for 3 weeks after surgery. That if done laparoscopically will plan on her going home the same day, but if I have to convert to an open procedure will have patient admitted to the hospital.    LAPAROSCOPIC CHOLECYSTECTOMY DISCHARGE INSTRUCTIONS  DR. DOMENICA HUSSEIN    1. You may resume your regular diet when you feel you are ready to. DO NOT drink alcoholic beverages for 24 hours of while you are taking prescription medication.    2. Limit your activities for the first 48 hours. Gradually, increase them as tolerated. You may use stairs. I encourage you to walk as tolerated. No lifting greater that 20 pounds for 3 weeks.    3. You will have some discomfort at the incision sites. This is expected. This should improve over the next 2-3 days. Ice and pain medication will help with this pain. Use prescribed pain medication as instructed.    4. Bruising and mild swelling is normal after surgery. The area below and around the incision(s) will be hard and elevated. This is normal. I call it the healing ridge. This will resolve slowly over the next several months. If you feel the pain is increasing and cannot explain it by increasing activity please call us at [phone].    5. The dressing will often have some blood on it. You may shower 24 hours after surgery. Clean gently over incision site. If clear plastic covering  or steri-strip comes off and there is still some bleeding or drainage then cover with gauze or band-aid. If there is no bleeding, there is no reason to cover site. The abdominal binder may be removed after 24 hours after surgery. You may continue to wear it however for comfort. I suggest  you wear an old t-shirt under the abdominal binder for a more comfortable wear.    6. Avoid Aspirin for the first 72 hours after the procedure. This medication may increase the tendency to bleed.    7. Use the following medications (in addition to your normal meds) as shown:  a. Percocet 5 mg 1-2 every 6 hours as needed for pain. This contains 500 mg of Tylenol (acetaminophen) per tablet. Please do not take more than 4 grams of Tylenol (acetaminophen) per day. For example, you may take 1 Percocet and 1 Tylenol, or 2 Percocet and no Tylenol, or 2 Tylenol and no Percocet every 6 hours.  b. Tylenol (acetaminophen) 500 mg every 6 hours as needed for pain. Do not take more than 1000 mg every 6 hours (see above).  c. Motrin (ibuprofen) 200-800 mg every 6 hours as needed for pain. Take with food.    8. Notify Dr. Beach's clinic at [phone] if:    Your discomfort is not relieved by your pain medication.    You have signs of infection such as temperature above 100.5 degrees orally, chills, or increasing daily discomfort.    Incision site is becoming more red and/or there is purulent drainage.    You have questions or concerns.    9. Please call [phone] to schedule a follow up appointment in 2 weeks.    10. When taking narcotics (pain medication more than Tylenol [acetaminophen] and Motrin [ibuprofen]) it is important to keep your stools soft to avoid constipation and pain with straining. This is best done by drinking fluids (non-alcoholic and non-caffeinated) and taking a stool softener (i.e. Metamucil or milk of magnesia). You may be able to use non-narcotics for pain relief especially by the 3rd post- operative day.  Tylenol 500 mg  every 6 hours and/or Motrin (ibuprofen) 200-800 mg every 6 hours. Please do not take more than 4 grams of Tylenol (acetaminophen) per day. Remember your Percocet does have Tylenol (acetaminophen) already in it. Please take Motrin (ibuprofen) with food to help protect the stomach. If you have a history of stomach ulcers or stomach problems, do not take Motrin (ibuprofen).    11. Do not drive or operate heavy machinery for 24 hours after surgery or when taking narcotics. You may resume driving when feel that you can safely avoid an accident and are not taking narcotics. This is usually 5 to 7 days after surgery. You should not be alone for 24 hours after surgery.    12. Bile is important for breaking down fatty foods. Excessive fatty foods may cause diarrhea. Please be aware of this when you first start eating fatty foods.    13. Have milk of magnesia available at home so that when you take the pain medications you take 1-2 teaspoons a day,  to help reduce problems with constipation.

## 2019-09-03 NOTE — PROGRESS NOTES
Patient had no real help from the Prilosec. Was seen by Dr. betancourt and ordered a HIDA scan.,    Patient has right upper quadrant pain now.   SPECIMEN(S):   A: Antral biopsies   B: Stomach body polyp biopsy     FINAL DIAGNOSIS:   A. Stomach, Antrum, Biopsy:   - Gastric antral and body mucosa with no significant inflammation   - No H. pylori like organisms identified on routine staining   - Negative for intestinal metaplasia or dysplasia     B. Stomach, Body, Polypectomy:   Fundic gland polyp     So I was thinking that she probably had gallbladder issues and had stones, but some of her symptoms did not add up to just the gallbladder.  So that is why we did the EGD.    Her ct scan showed. Pancrease divisum and family history of pancreatic cancer so she saw the  gastrointestinal  Doctors.  And is scheduled for an EUS later this month.  Her HIDA scan shows poor gallbladder function.  So would recommend doing the laparoscopic cholecystectomy possible open.   But the patient and I agree that doing the EUS first would be best.      Utrasound shows + stones -dialted ducts -  thickening fo the gallbladder .   Patient denies any symptoms of common bile duct stone, or pancreatitis.  Last colonoscopy was no polyps  In 2017  Assessment:  cholelithiasis with right upper quadrant pain but mostly epigastric pain and happens with nausea and bloating and not from fatty foods.   In fact ate a hamburger and no problems.  So recommend an EGD.  Has a grandson and his mother living with them and this is somewhat stressful.   Did do the Mylanta lidocaine drink and that made her epigastric pain worse.   Will recheck liver function test and lipase.    And will start on prilosec.   Had a tubal ligation and looks like they went above the umbilicus so may need right upper quadrant trocar.    Plan to do  If needed a laparoscopic cholecystectomy possible open.  But right now her stomach may be more of the problem.   We discussed typical  gallbladder symptoms and I drew out a diagram to help discuss how the gallbladder works and what are the symptoms of a gallbladder attack.  I also discussed gallbladder surgery with risks and complications of surgery including bleeding, hernias, damage to bowel , damage to the bile ducts, risks of anesthesia.  If I get into bleeding that I can not control laparoscopicly, if I get a hole in the bowel or if the anatomy of the bile ducts does not look normal I may have to convert to an open procedure.  Discussed what to expect afterwards, the use of the abdominal binder and no lifting greater than 20 pounds for 3 weeks after surgery. That if done laparoscopically will plan on her going home the same day, but if I have to convert to an open procedure will have patient admitted to the hospital.     Time spent with the patient with greater that 50% of the time in discussion was 30 minutes.  In discussing the plan and her latest test results. .      Jamal Beach MD      Examination:  Hepatobiliary scan       Date: 8/30/2019 9:33 AM.     Indication:   Post-prandial abdominal pain/bloating       Additional Information: none     Comparison: CT abdomen/pelvis on 7/30/2019     Technique:     The patient received 6 mCi of Tc-99m Choletec intravenously. Images  were obtained out through 60 minutes.   At 60 minutes the patient  received Shake equaling 28 grams fat given for EF  . An additional 45 minutes of images were obtained after the gall  bladder contraction intervention.     Findings:     There is prompt clearance of the radionuclide from the blood pool into  the liver. By 10 minutes there is clear visualization of the  intrahepatic ducts as well as the upper common bile duct. By 15  minutes there is visualization of the gallbladder. At 60 minutes there  is emptying from the common bile duct into the small bowel.     Gallbladder ejection fraction was measured at 35%.  The normal  gallbladder EF based on a 45 minute  infusion is >40%.     Enterogastric reflux was not present.                                                                      Impression:     Findings compatible with gallbladder dyskinesia versus chronic  cholecystitis. No evidence of acute cholecystitis.      =======================     The normal Gall Bladder ejection fraction for a 45 minute infusion is  >40%     I have personally reviewed the examination and initial interpretation  and I agree with the findings.     ZACH RICO MD  Per  gastrointestinal  Doc:  ASSESSMENT/PLAN:  1. Post-prandial abdominal pain/bloating - differential includes symptomatic cholelithiasis, irritable bowel, constipation related, etc.  No evidence of PUD, gastritis or GERD on recent EGD.  Less likely pancreatic etiology, although possible.  LFTs wnl on recent labs.  Lipase low normal.  Will give trial of bentyl. Can continue simethicone PRN.  Stop omeprazole as patient reports no improvement. Bowel regimen to prevent constipation. Will also obtain HIDA with [facility].     2. Cholelithiasis     3. Pancreas divisum      4. Family history of pancreatic cancer in first degree relative + mildly atrophic pancreas on CT - will plan for EUS with Dr. Jaeger to better evaluate pancreatic parenchyma.  Study Result     EXAMINATION: CT ABDOMEN PELVIS W CONTRAST, 7/30/2019 9:20 AM     TECHNIQUE:  Helical CT images from the lung bases through the  symphysis pubis were obtained with IV contrast. Contrast dose: 105 ml  isovue 370     COMPARISON: Correlation made with abdominal ultrasound 4/11/2019     HISTORY: Abd distension; patient with vague bloating and family  history of brother with pancreatic cancer.; Abdominal pain,  generalized; Family history of pancreatic cancer     FINDINGS:     Lung bases: Unremarkable     Abdomen and pelvis: Focal fatty sparing of the liver along the  falciform ligament. Normal spleen. Cholelithiasis without evidence  cholecystitis. Pancreas divisum. Small  periampullary duodenal  diverticulum. Pancreas is mildly atrophic. Normal adrenal glands.  Renal cortical cysts. No dilation of the urinary collecting system.  Minimally distended bladder. Multiple phleboliths. No pelvic mass.  Nonobstructive bowel gas pattern. Diverticulosis without  diverticulitis. Degenerative changes of thoracolumbar spine.     Bones and soft tissues: No concerning osseous or soft tissue findings.                                                                      IMPRESSION:   1. Diverticulosis without diverticulitis.  2. Cholelithiasis without cholecystitis.  3. Focal fatty sparing of the liver.  4. Pancreas divisum without pancreatic mass.     I have personally reviewed the examination and initial interpretation  and I agree with the findings.     ANNE PATIÑO MD               Linked Documents     View Image   Component Collected Lab   Upper GI Endoscopy 07/25/2019 12:41 PM [facility]   Endoscopy Department-[place]   _______________________________________________________________________________   Patient Name: Jena Ibarra            Procedure Date: 7/25/2019 12:41 PM   MRN: [id number]                       YOB: 1955   Admit Type: Outpatient                Age: 64   Gender: Female                        Note Status: Finalized   Attending MD: Jamal Beach MD     Instrument Name: GIF  [id number]   _______________________________________________________________________________       Procedure:                Upper GI endoscopy   Indications:              Epigastric abdominal pain, Generalized abdominal                             pain, Nausea   Providers:                Jamal Beach MD, Maximus Yoon   Referring MD:             Jamal Beach MD   Medicines:                Fentanyl 100 micrograms IV, Midazolam 3 mg IV,                             Ondansetron 4 mg IV   Complications:            No immediate  complications.   _______________________________________________________________________________   Procedure:                Pre-Anesthesia Assessment:                             - Prior to the procedure, a History and Physical                             was performed, and patient medications and                             allergies were reviewed. The risks and benefits of                             the procedure and the sedation options and risks                             were discussed with the patient. All questions were                             answered and informed consent was obtained. Patient                             identification and proposed procedure were verified                             by the physician in the pre-procedure area. Mental                             Status Examination: alert and oriented. Airway                             Examination: normal oropharyngeal airway and neck                             mobility. Respiratory Examination: clear to                             auscultation. CV Examination: normal. Prophylactic                             Antibiotics: The patient does not require                             prophylactic antibiotics. Prior Anticoagulants: The                             patient has taken no previous anticoagulant or                             antiplatelet agents. ASA Grade Assessment: II - A                             patient with mild systemic disease. After reviewing                             the risks and benefits, the patient was deemed in                             satisfactory condition to undergo the procedure.                             The anesthesia plan was to use moderate sedation /                             analgesia (conscious sedation). This assessment was                             completed before the administration of sedation at                             12:35 PM.                             After obtaining informed  consent, the endoscope was                             passed under direct vision. Throughout the                             procedure, the patient's blood pressure, pulse, and                             oxygen saturations were monitored continuously. The                             Endoscope was introduced through the mouth, and                             advanced to the second part of duodenum. The upper                             GI endoscopy was accomplished without difficulty.                             The patient tolerated the procedure well.                                                                                     Findings:        The examined esophagus was normal.        Diffuse mildly erythematous mucosa without bleeding was found in the        gastric antrum. Biopsies were taken with a cold forceps for Helicobacter        pylori testing.        The ampulla, duodenal bulb, first portion of the duodenum and second        portion of the duodenum were normal.        The exam was otherwise without abnormality.                                                                                     Moderate Sedation:        Moderate (conscious) sedation was administered by the endoscopy nurse        and supervised by the endoscopist. The following parameters were        monitored: oxygen saturation, heart rate, blood pressure, and response        to care. Total physician intraservice time was 11 minutes.   Impression:               - Normal esophagus.                             - Erythematous mucosa in the antrum. Biopsied.                             - Normal ampulla, duodenal bulb, first portion of                             the duodenum and second portion of the duodenum.                             - The examination was otherwise normal.                             ge junction 35 cm with 2-3 cm hiatal hernia   Recommendation:           - Your EGD went well. I did take biopsies of your                              esophagus and stomach. Your stomach does show some                             inflammation, no ulcers. Your esophagus looks                             normal. Please continue your Prilosec and let me                             know if it is helping.                             Since your symptoms do no match up well with your                             gallbladder, we may need to look at other causes.                             Please call [phone] to set up an appointment                             in 1 to 2 weeks to go over the biopsy results. Or                             wait for a letter with the biopsy results in 3-4                             weeks. Please call [phone], if after 5 weeks                             you did not receive your letter.                             There is no evidence of any cancer.                             You should avoid alcohol, Motrin, Advil, ibuprofen                             and aspirin as these can irritate the stomach. For                             reflux, eating small meals, losing weight, and not                             eating several hours before bedtime will help. Also                             avoiding alcohol, tobacco, chocolate, peppermint                             and caffeine will help your reflux symptoms.                             If you are not doing better with just the Prilosec                             we may need to do a ct scan of your abdomen to make                             sure that there is nothing else going on.                             Your liver function test and lipase we did was                             normal.                             Gallbladder can be tricky, but you know what the                             usual symptoms area and that fatty food is the                             culprit.                             Please call Brittanie's number at  if              "                getting worse or want to get the ct scan ordered.                             Please follow up with me if you think the symptoms                             are more consistant with gallbladder problems.                                                                                       ___________________   Jamal Beach MD   7/25/2019 1:12:44 PM   I was physically present for the entire viewing      Office Visit     7/16/2019  Jamal Whitehead MD   Surgery   Epigastric pain   Dx   Abdominal Pain ; Referred by Willy Colmenares MD   Reason for Visit    Progress Notes     Expand All Collapse All    Dear Ludmila Garcia  I was asked to see this patient by Ludmila Torres for please see below.  I have seen Jena Ibarra and as you know his chief complaint is epigastric pain  Bloating and nausea and nausea with just eating a banana.    Coffee plain upsets her stomach.   Has had some pain in the right upper quadrant area but more in the epigastric area.  Also on the left upper quadrant.       HPI:  Patient is a 64 year old female  with complaints see above  The last episode the patient ate ? which started the symptoms  Patient has family history of gallbladder problems  Mother and sister  Brother had pancreatic cancer  Not eating makes the episode better.  Is taking gasx that helps and sometimes tums.         Review Of Systems  Respiratory: No shortness of breath, dyspnea on exertion, cough, or hemoptysis  Cardiovascular: negative  Gastrointestinal: nausea, constipation and diarrhea  Endocrine: negative  10 point review of systems done and all others are negative other than above.   /86   Pulse 86   Ht 1.676 m (5' 6\")   Wt 78.5 kg (173 lb)   BMI 27.92 kg/m       Past Medical History        Past Medical History:   Diagnosis Date     Diverticulosis       History of colon polyps       Hyperlipidemia LDL goal < 160       Osteopenia "              Past Surgical History   Past Surgical History:   Procedure Laterality Date     COLONOSCOPY         COLONOSCOPY WITH CO2 INSUFFLATION N/A 10/13/2014     Procedure: COLONOSCOPY WITH CO2 INSUFFLATION;  Surgeon: Jamal Beach MD;  Location: [facility] OR     COLONOSCOPY WITH CO2 INSUFFLATION N/A 11/14/2017     Procedure: COLONOSCOPY WITH CO2 INSUFFLATION;  COLON-RECTAL BLEEDING / BARUSYA;  Surgeon: Henok Jewell MD;  Location: [facility] OR     TUBAL LIGATION                Social History   Social History            Socioeconomic History     Marital status:        Spouse name: Not on file     Number of children: Not on file     Years of education: Not on file     Highest education level: Not on file   Occupational History       Employer: [employer]   Social Needs     Financial resource strain: Not on file     Food insecurity:       Worry: Not on file       Inability: Not on file     Transportation needs:       Medical: Not on file       Non-medical: Not on file   Tobacco Use     Smoking status: Never Smoker     Smokeless tobacco: Never Used   Substance and Sexual Activity     Alcohol use: Yes       Comment: occasional     Drug use: No     Sexual activity: Yes       Partners: Male       Birth control/protection: Surgical       Comment: tubal ligation   Lifestyle     Physical activity:       Days per week: Not on file       Minutes per session: Not on file     Stress: Not on file   Relationships     Social connections:       Talks on phone: Not on file       Gets together: Not on file       Attends L[employer]-day service: Not on file       Active member of club or organization: Not on file       Attends meetings of clubs or organizations: Not on file       Relationship status: Not on file     Intimate partner violence:       Fear of current or ex partner: Not on file       Emotionally abused: Not on file       Physically abused: Not on file       Forced sexual activity: Not on file   Other Topics Concern      Parent/sibling w/ CABG, MI or angioplasty before 65F 55M? Not Asked   Social History Narrative     Not on file            Current Outpatient Prescriptions   No current outpatient medications on file.         Physical exam:  Patient able to get up on table without difficulty.  Head eyes, nose and mouth within normal limits.  No supraclavicular or cervical adenopathy palpated.  Thyroid within normal limits.  No carotid bruits auscultated.  Lungs are clear to auscultation  Heart is regular rate and rhythm with no murmur or thrills noted.  No costal vertebral angle tenderness noted.  Abdomen is abdomen is soft without significant tenderness, masses, organomegaly or guarding  bowel sounds are positive and no caput medusa noted.  Mild epigastric discomfort  No obvious hernias noted.  Easily palpable posterior tibial pulse or dorsalis pedis pulse bilaterally.  Lower extremity edema is not present.  Skin warm and pink     Study Result      ULTRASOUND ABDOMEN LIMITED  4/11/2019 11:11 AM      HISTORY: Epigastric abdominal pain.     COMPARISON: None.     FINDINGS:  Liver is mildly fatty infiltrated as evidenced by a diffuse  increase in echogenicity without focal lesions. Gallbladder  demonstrates cholelithiasis without evidence for cholecystitis.  Extrahepatic bile duct is normal in diameter. Pancreas is normal where  visualized. Right kidney is normal in size. There is no  hydronephrosis. Tiny simple renal cysts noted measuring 1 cm.        IMPRESSION:    1. Cholelithiasis without evidence for cholecystitis.   2. Fatty liver.      JASON MAC MD         Labs show:               Exam Date Exam Time Accession # Results    4/9/19  4:35 PM [id number]     Component Value Flag Ref Range Units Status Collected Lab   Bilirubin Direct 0.1    0.0 - 0.2 mg/dL Final 04/09/2019  4:35 PM [facility]   Bilirubin Total 0.6    0.2 - 1.3 mg/dL Final 04/09/2019  4:35 PM [facility]   Albumin 4.2    3.4 - 5.0 g/dL Final 04/09/2019  4:35 PM [facility]   Protein  Total 7.6    6.8 - 8.8 g/dL Final 04/09/2019  4:35 PM [facility]   Alkaline Phosphatase 73    40 - 150 U/L Final 04/09/2019  4:35 PM [facility]   ALT 27    0 - 50 U/L Final 04/09/2019  4:35 PM [facility]   AST 17    0 - 45 U/L Final 04/09/2019  4:35 PM [facility]   Lab and Collection      Lipase [LAB99] (Order [id number])   Exam Information                   Exam Date Exam Time Accession # Results    4/9/19  4:35 PM [id number]     Component Value Flag Ref Range Units Status Collected Lab   Lipase 95    73 - 393 U/L Final 04/09/2019  4:35 PM              Utrasound shows + stones -dialted ducts -  thickening fo the gallbladder .   Patient denies any symptoms of common bile duct stone, or pancreatitis.  Last colonoscopy was no polyps  In 2017  Assessment:  cholelithiasis with right upper quadrant pain but mostly epigastric pain and happens with nausea and bloating and not from fatty foods.   In fact ate a hamburger and no problems.  So recommend an EGD.  Has a grandson and his mother living with them and this is somewhat stressful.   Did do the Mylanta lidocaine drink and that made her epigastric pain worse.   Will recheck liver function test and lipase.    And will start on prilosec.   Had a tubal ligation and looks like they went above the umbilicus so may need right upper quadrant trocar.    Plan to do  If needed a laparoscopic cholecystectomy possible open.  But right now her stomach may be more of the problem.   We discussed typical gallbladder symptoms and I drew out a diagram to help discuss how the gallbladder works and what are the symptoms of a gallbladder attack.  I also discussed gallbladder surgery with risks and complications of surgery including bleeding, hernias, damage to bowel , damage to the bile ducts, risks of anesthesia.  If I get into bleeding that I can not control laparoscopicly, if I get a hole in the bowel or if the anatomy of the bile ducts does not look normal I may have to convert to an open procedure.  Discussed  what to expect afterwards, the use of the abdominal binder and no lifting greater than 20 pounds for 3 weeks after surgery. That if done laparoscopically will plan on her going home the same day, but if I have to convert to an open procedure will have patient admitted to the hospital.        Time spent with the patient with greater that 50% of the time in discussion was 33 minutes.     Jamal Beach MD

## 2019-09-16 ENCOUNTER — ANESTHESIA EVENT (OUTPATIENT)
Dept: SURGERY | Facility: AMBULATORY SURGERY CENTER | Age: 64
End: 2019-09-16

## 2019-09-17 NOTE — ANESTHESIA PREPROCEDURE EVALUATION
Anesthesia Pre-Procedure Evaluation    Patient: Jena Ibarra   MRN:     [id number] Gender:   female   Age:    64 year old :      1955        Preoperative Diagnosis: Diagnostic EUS with MAC   Procedure(s):  ESOPHAGOGASTRODUODENOSCOPY, WITH ENDOSCOPIC US     Past Medical History:   Diagnosis Date     Diverticulosis      History of colon polyps      Hyperlipidemia LDL goal < 160      Osteopenia       Past Surgical History:   Procedure Laterality Date     COLONOSCOPY       COLONOSCOPY WITH CO2 INSUFFLATION N/A 10/13/2014    Procedure: COLONOSCOPY WITH CO2 INSUFFLATION;  Surgeon: Jamal Beach MD;  Location: [facility]     COLONOSCOPY WITH CO2 INSUFFLATION N/A 2017    Procedure: COLONOSCOPY WITH CO2 INSUFFLATION;  COLON-RECTAL BLEEDING / BARUSYA;  Surgeon: Henok Jewell MD;  Location: [facility]     COMBINED ESOPHAGOSCOPY, GASTROSCOPY, DUODENOSCOPY (EGD) WITH CO2 INSUFFLATION N/A 2019    Procedure: ESOPHAGOGASTRODUODENOSCOPY, WITH CO2 INSUFFLATION;  Surgeon: Jamal Beach MD;  Location: [facility]     ESOPHAGOSCOPY, GASTROSCOPY, DUODENOSCOPY (EGD), COMBINED N/A 2019    Procedure: Esophagogastroduodenoscopy, With Biopsy;  Surgeon: Jamal Beach MD;  Location: [facility]     TUBAL LIGATION                     PHYSICAL EXAM:   Mental Status/Neuro: A/A/O   Airway: Facies: Feasible  Mallampati: II  Mouth/Opening: Full  TM distance: > 6 cm  Neck ROM: Full   Respiratory: Auscultation: CTAB     Resp. Rate: Normal     Resp. Effort: Normal      CV: Rhythm: Regular  Rate: Age appropriate  Heart: Normal Sounds  Edema: None   Comments: Some implants     Dental: Details                LABS:  CBC:   Lab Results   Component Value Date    WBC 6.6 2019    WBC 5.0 10/13/2017    HGB 13.6 2019    HGB 12.2 10/13/2017    HCT 41.1 2019    HCT 37.0 10/13/2017     2019     10/13/2017     BMP:   Lab Results   Component Value Date     10/13/2017      "09/04/2014    POTASSIUM 4.0 10/13/2017    POTASSIUM 4.1 09/04/2014    CHLORIDE 105 10/13/2017    CHLORIDE 106 09/04/2014    CO2 26 10/13/2017    CO2 30 09/04/2014    BUN 20 10/13/2017    BUN 20 09/04/2014    CR 0.83 10/13/2017    CR 0.93 09/04/2014    GLC 93 10/27/2017    GLC 85 10/13/2017     COAGS: No results found for: PTT, INR, FIBR  POC: No results found for: BGM, HCG, HCGS  OTHER:   Lab Results   Component Value Date    SALEEM 9.0 10/13/2017    ALBUMIN 4.1 07/16/2019    PROTTOTAL 7.3 07/16/2019    ALT 39 07/16/2019    AST 23 07/16/2019    ALKPHOS 86 07/16/2019    BILITOTAL 0.6 07/16/2019    LIPASE 64 (L) 07/16/2019    TSH 1.95 10/13/2017        Preop Vitals    BP Readings from Last 3 Encounters:   09/03/19 111/77   08/27/19 136/86   08/15/19 (!) 144/91    Pulse Readings from Last 3 Encounters:   09/03/19 80   08/27/19 73   08/15/19 66      Resp Readings from Last 3 Encounters:   08/27/19 18   07/25/19 18   04/09/19 18    SpO2 Readings from Last 3 Encounters:   08/27/19 99%   08/15/19 99%   07/25/19 100%      Temp Readings from Last 1 Encounters:   09/03/19 97.9  F (36.6  C) (Oral)    Ht Readings from Last 1 Encounters:   08/27/19 1.676 m (5' 6\")      Wt Readings from Last 1 Encounters:   09/03/19 76.7 kg (169 lb)    Estimated body mass index is 27.28 kg/m  as calculated from the following:    Height as of 8/27/19: 1.676 m (5' 6\").    Weight as of 9/3/19: 76.7 kg (169 lb).     LDA:        Assessment:   ASA SCORE: 2    H&P: History and physical reviewed and following examination; no interval change.    NPO Status: NPO Appropriate     Plan:   Anes. Type:  MAC   Pre-Medication: None   Induction:  IV (Standard)   Airway: Native Airway   Access/Monitoring: PIV   Maintenance: N/a     Postop Plan:   Postop Pain: None  Postop Sedation/Airway: Not planned  Disposition: Outpatient     PONV Management: Adult Risk Factors: Female   Prevention: Ondansetron     CONSENT: Direct conversation   Plan and risks discussed with: " Patient                      Raymon Ramirez MD

## 2019-09-18 ENCOUNTER — ANESTHESIA (OUTPATIENT)
Dept: SURGERY | Facility: AMBULATORY SURGERY CENTER | Age: 64
End: 2019-09-18
Payer: COMMERCIAL

## 2019-09-18 ENCOUNTER — SURGERY (OUTPATIENT)
Age: 64
End: 2019-09-18
Payer: COMMERCIAL

## 2019-09-18 ENCOUNTER — HOSPITAL ENCOUNTER (OUTPATIENT)
Facility: AMBULATORY SURGERY CENTER | Age: 64
Discharge: HOME | End: 2019-09-18
Attending: INTERNAL MEDICINE | Admitting: INTERNAL MEDICINE
Payer: COMMERCIAL

## 2019-09-18 VITALS
DIASTOLIC BLOOD PRESSURE: 76 MMHG | SYSTOLIC BLOOD PRESSURE: 110 MMHG | OXYGEN SATURATION: 96 % | TEMPERATURE: 97.6 F | RESPIRATION RATE: 16 BRPM

## 2019-09-18 VITALS — HEART RATE: 70 BPM

## 2019-09-18 PROCEDURE — 43237 ENDOSCOPIC US EXAM ESOPH: CPT | Performed by: INTERNAL MEDICINE

## 2019-09-18 PROCEDURE — G8907 PT DOC NO EVENTS ON DISCHARG: HCPCS

## 2019-09-18 PROCEDURE — G8918 PT W/O PREOP ORDER IV AB PRO: HCPCS

## 2019-09-18 PROCEDURE — 43237 ENDOSCOPIC US EXAM ESOPH: CPT

## 2019-09-18 RX ORDER — PHYSOSTIGMINE SALICYLATE 1 MG/ML
1.2 INJECTION INTRAVENOUS
Status: DISCONTINUED | OUTPATIENT
Start: 2019-09-18 | End: 2019-09-19 | Stop reason: HOSPADM

## 2019-09-18 RX ORDER — NALOXONE HYDROCHLORIDE 0.4 MG/ML
.1-.4 INJECTION, SOLUTION INTRAMUSCULAR; INTRAVENOUS; SUBCUTANEOUS
Status: DISCONTINUED | OUTPATIENT
Start: 2019-09-18 | End: 2019-09-19 | Stop reason: HOSPADM

## 2019-09-18 RX ORDER — PROPOFOL 10 MG/ML
INJECTION, EMULSION INTRAVENOUS CONTINUOUS PRN
Status: DISCONTINUED | OUTPATIENT
Start: 2019-09-18 | End: 2019-09-18

## 2019-09-18 RX ORDER — ONDANSETRON 2 MG/ML
4 INJECTION INTRAMUSCULAR; INTRAVENOUS EVERY 6 HOURS PRN
Status: DISCONTINUED | OUTPATIENT
Start: 2019-09-18 | End: 2019-09-19 | Stop reason: HOSPADM

## 2019-09-18 RX ORDER — LIDOCAINE HYDROCHLORIDE 20 MG/ML
INJECTION, SOLUTION INFILTRATION; PERINEURAL PRN
Status: DISCONTINUED | OUTPATIENT
Start: 2019-09-18 | End: 2019-09-18

## 2019-09-18 RX ORDER — LIDOCAINE 40 MG/G
CREAM TOPICAL
Status: DISCONTINUED | OUTPATIENT
Start: 2019-09-18 | End: 2019-09-19 | Stop reason: HOSPADM

## 2019-09-18 RX ORDER — SODIUM CHLORIDE, SODIUM LACTATE, POTASSIUM CHLORIDE, CALCIUM CHLORIDE 600; 310; 30; 20 MG/100ML; MG/100ML; MG/100ML; MG/100ML
INJECTION, SOLUTION INTRAVENOUS CONTINUOUS
Status: DISCONTINUED | OUTPATIENT
Start: 2019-09-18 | End: 2019-09-19 | Stop reason: HOSPADM

## 2019-09-18 RX ORDER — ONDANSETRON 4 MG/1
4 TABLET, ORALLY DISINTEGRATING ORAL EVERY 6 HOURS PRN
Status: DISCONTINUED | OUTPATIENT
Start: 2019-09-18 | End: 2019-09-19 | Stop reason: HOSPADM

## 2019-09-18 RX ORDER — ALBUTEROL SULFATE 0.83 MG/ML
2.5 SOLUTION RESPIRATORY (INHALATION) EVERY 4 HOURS PRN
Status: DISCONTINUED | OUTPATIENT
Start: 2019-09-18 | End: 2019-09-19 | Stop reason: HOSPADM

## 2019-09-18 RX ORDER — ONDANSETRON 2 MG/ML
INJECTION INTRAMUSCULAR; INTRAVENOUS PRN
Status: DISCONTINUED | OUTPATIENT
Start: 2019-09-18 | End: 2019-09-18

## 2019-09-18 RX ORDER — FLUMAZENIL 0.1 MG/ML
0.2 INJECTION, SOLUTION INTRAVENOUS
Status: SHIPPED | OUTPATIENT
Start: 2019-09-18 | End: 2019-09-18

## 2019-09-18 RX ORDER — FENTANYL CITRATE 50 UG/ML
25-50 INJECTION, SOLUTION INTRAMUSCULAR; INTRAVENOUS
Status: DISCONTINUED | OUTPATIENT
Start: 2019-09-18 | End: 2019-09-19 | Stop reason: HOSPADM

## 2019-09-18 RX ORDER — HYDRALAZINE HYDROCHLORIDE 20 MG/ML
2.5-5 INJECTION INTRAMUSCULAR; INTRAVENOUS EVERY 10 MIN PRN
Status: DISCONTINUED | OUTPATIENT
Start: 2019-09-18 | End: 2019-09-19 | Stop reason: HOSPADM

## 2019-09-18 RX ORDER — ONDANSETRON 2 MG/ML
4 INJECTION INTRAMUSCULAR; INTRAVENOUS
Status: DISCONTINUED | OUTPATIENT
Start: 2019-09-18 | End: 2019-09-19 | Stop reason: HOSPADM

## 2019-09-18 RX ORDER — METOPROLOL TARTRATE 1 MG/ML
1-2 INJECTION, SOLUTION INTRAVENOUS EVERY 5 MIN PRN
Status: DISCONTINUED | OUTPATIENT
Start: 2019-09-18 | End: 2019-09-19 | Stop reason: HOSPADM

## 2019-09-18 RX ORDER — MEPERIDINE HYDROCHLORIDE 25 MG/ML
12.5 INJECTION INTRAMUSCULAR; INTRAVENOUS; SUBCUTANEOUS
Status: DISCONTINUED | OUTPATIENT
Start: 2019-09-18 | End: 2019-09-19 | Stop reason: HOSPADM

## 2019-09-18 RX ORDER — FENTANYL CITRATE 50 UG/ML
INJECTION, SOLUTION INTRAMUSCULAR; INTRAVENOUS PRN
Status: DISCONTINUED | OUTPATIENT
Start: 2019-09-18 | End: 2019-09-18

## 2019-09-18 RX ORDER — PROPOFOL 10 MG/ML
INJECTION, EMULSION INTRAVENOUS PRN
Status: DISCONTINUED | OUTPATIENT
Start: 2019-09-18 | End: 2019-09-18

## 2019-09-18 RX ORDER — DEXAMETHASONE SODIUM PHOSPHATE 4 MG/ML
4 INJECTION, SOLUTION INTRA-ARTICULAR; INTRALESIONAL; INTRAMUSCULAR; INTRAVENOUS; SOFT TISSUE EVERY 10 MIN PRN
Status: DISCONTINUED | OUTPATIENT
Start: 2019-09-18 | End: 2019-09-19 | Stop reason: HOSPADM

## 2019-09-18 RX ORDER — ONDANSETRON 2 MG/ML
4 INJECTION INTRAMUSCULAR; INTRAVENOUS EVERY 30 MIN PRN
Status: DISCONTINUED | OUTPATIENT
Start: 2019-09-18 | End: 2019-09-19 | Stop reason: HOSPADM

## 2019-09-18 RX ORDER — ONDANSETRON 4 MG/1
4 TABLET, ORALLY DISINTEGRATING ORAL EVERY 30 MIN PRN
Status: DISCONTINUED | OUTPATIENT
Start: 2019-09-18 | End: 2019-09-19 | Stop reason: HOSPADM

## 2019-09-18 RX ADMIN — PROPOFOL 100 MG: 10 INJECTION, EMULSION INTRAVENOUS at 08:33

## 2019-09-18 RX ADMIN — PROPOFOL 150 MCG/KG/MIN: 10 INJECTION, EMULSION INTRAVENOUS at 08:33

## 2019-09-18 RX ADMIN — SODIUM CHLORIDE, SODIUM LACTATE, POTASSIUM CHLORIDE, CALCIUM CHLORIDE: 600; 310; 30; 20 INJECTION, SOLUTION INTRAVENOUS at 07:34

## 2019-09-18 RX ADMIN — Medication 50 MCG: at 09:11

## 2019-09-18 RX ADMIN — FENTANYL CITRATE 25 MCG: 50 INJECTION, SOLUTION INTRAMUSCULAR; INTRAVENOUS at 08:30

## 2019-09-18 RX ADMIN — LIDOCAINE HYDROCHLORIDE 100 MG: 20 INJECTION, SOLUTION INFILTRATION; PERINEURAL at 08:33

## 2019-09-18 RX ADMIN — Medication 50 MCG: at 08:59

## 2019-09-18 RX ADMIN — ONDANSETRON 4 MG: 2 INJECTION INTRAMUSCULAR; INTRAVENOUS at 08:30

## 2019-09-18 NOTE — ANESTHESIA POSTPROCEDURE EVALUATION
Anesthesia POST Procedure Evaluation    Patient: Jena Ibarra   MRN:     [id number] Gender:   female   Age:    64 year old :      1955        Preoperative Diagnosis: Diagnostic EUS with MAC   Procedure(s):  ESOPHAGOGASTRODUODENOSCOPY, WITH ENDOSCOPIC US   Postop Comments: No value filed.       Anesthesia Type:  Not documented  MAC    Reportable Event: NO     PAIN: Uncomplicated   Sign Out status: Comfortable, Well controlled pain     PONV: No PONV   Sign Out status:  No Nausea or Vomiting     Neuro/Psych: Uneventful perioperative course   Sign Out Status: Preoperative baseline; Age appropriate mentation     Airway/Resp.: Uneventful perioperative course   Sign Out Status: Non labored breathing, age appropriate RR; Resp. Status within EXPECTED Parameters     CV: Uneventful perioperative course   Sign Out status: Appropriate BP and perfusion indices; Appropriate HR/Rhythm     Disposition:   Sign Out in:  Phase II  Disposition:  Home  Recovery Course: Uneventful  Follow-Up: Not required           Last Anesthesia Record Vitals:  CRNA VITALS  2019 0850 - 2019 0950      2019             Pulse:  68    SpO2:  100 %          Last PACU Vitals:  Vitals Value Taken Time   BP 94/65 2019  9:20 AM   Temp     Pulse 70 2019  9:15 AM   Resp 16 2019  9:20 AM   SpO2 94 % 2019  9:20 AM   Temp src Skin 2019  9:15 AM   NIBP 99/72 2019  9:15 AM   Pulse 68 2019  9:19 AM   SpO2 100 % 2019  9:19 AM   Resp     Temp     Ht Rate 76 2019  9:17 AM   Temp 2           Electronically Signed By: Raymon Ramirez MD, 2019, 10:05 AM

## 2019-09-18 NOTE — ANESTHESIA CARE TRANSFER NOTE
Patient: Jena Ibarra    Procedure(s):  ESOPHAGOGASTRODUODENOSCOPY, WITH ENDOSCOPIC US    Diagnosis: Diagnostic EUS with MAC  Diagnosis Additional Information: No value filed.    Anesthesia Type:   MAC     Note:  Airway :Room Air  Patient transferred to:Phase II  Comments: To Phase II. Report to RN.  VSS Resp status stable.Handoff Report: Identifed the Patient, Identified the Reponsible Provider, Reviewed the pertinent medical history, Discussed the surgical course, Reviewed Intra-OP anesthesia mangement and issues during anesthesia, Set expectations for post-procedure period and Allowed opportunity for questions and acknowledgement of understanding      Vitals: (Last set prior to Anesthesia Care Transfer)    CRNA VITALS  9/18/2019 0850 - 9/18/2019 0920      9/18/2019             Pulse:  68    SpO2:  100 %                Electronically Signed By: DEANDRE Ambrose CRNA  September 18, 2019  9:20 AM

## 2019-09-18 NOTE — DISCHARGE INSTRUCTIONS
Follow up with Dr Munroe and Dr. Vera    MRI/MRCP in 6 months to confirm no changes in the pancreas.

## 2019-09-18 NOTE — BRIEF OP NOTE
[facility] Brief Operative Note    Pre-operative diagnosis: Diagnostic EUS with MAC   Post-operative diagnosis Pancreas divisum   Procedure: Procedure(s):  ESOPHAGOGASTRODUODENOSCOPY, WITH ENDOSCOPIC US   Surgeon(s): Surgeon(s) and Role:     * Ric Jaeger MD - Primary   Estimated blood loss: * No values recorded between 9/18/2019  8:31 AM and 9/18/2019  9:19 AM *    Specimens: none   Findings: Pancreas with parynchymal changes of stranding, and heterogeneity.  PD was non-dilated with hyperechoic duct wall.  No masses.  Area of mild heterogenous parynchyma from the sweep, not observed from bulb station, likely due to chronic pancreatic changes.  CBD nondilated and no stones.  GB with large shadowing gallstones.    Plan:  Follow up with surgery for cholecytectomy             MRCP in 6 months to confirm stability.  If no changes then no further surveillance needed after this.    Ric Jaeger MD       
room air

## 2019-09-20 DIAGNOSIS — K86.89 ATROPHIC PANCREAS: Primary | ICD-10-CM

## 2019-09-20 LAB — UPPER EUS: NORMAL

## 2019-09-23 ENCOUNTER — TELEPHONE (OUTPATIENT)
Dept: GASTROENTEROLOGY | Facility: CLINIC | Age: 64
End: 2019-09-23

## 2019-09-24 ENCOUNTER — OFFICE VISIT (OUTPATIENT)
Dept: SURGERY | Facility: CLINIC | Age: 64
End: 2019-09-24
Payer: COMMERCIAL

## 2019-09-24 VITALS
BODY MASS INDEX: 27.16 KG/M2 | WEIGHT: 169 LBS | HEIGHT: 66 IN | HEART RATE: 81 BPM | DIASTOLIC BLOOD PRESSURE: 81 MMHG | SYSTOLIC BLOOD PRESSURE: 136 MMHG

## 2019-09-24 DIAGNOSIS — R10.11 RUQ ABDOMINAL PAIN: Primary | ICD-10-CM

## 2019-09-24 DIAGNOSIS — K80.20 GALLSTONES: ICD-10-CM

## 2019-09-24 PROCEDURE — 99214 OFFICE O/P EST MOD 30 MIN: CPT | Performed by: SURGERY

## 2019-09-24 ASSESSMENT — MIFFLIN-ST. JEOR: SCORE: 1323.33

## 2019-09-24 NOTE — PROGRESS NOTES
Please call Brittanie's number at  if getting worse.    When she had the fatty drink she had some sensation of gurgling stomach, but no right upper quadrant pain.    So patient has been having right upper quadrant pain.   Last episode was at 0500, had positive samuel sign as patient did this to herself.    Ate at at 730 the night before. Was roast turkey with gravy.    So I think that her most likely problem is the gallbladder.  So will plan on taking out the gallbladder as planned.     Her cyst on her kidneys are benign.     Had a tubal ligation and looks like they went above the umbilicus so may need right upper quadrant trocar.    Plan to do  If needed a laparoscopic cholecystectomy possible open.  But right now her stomach may be more of the problem.   We discussed typical gallbladder symptoms and I drew out a diagram to help discuss how the gallbladder works and what are the symptoms of a gallbladder attack.  I also discussed gallbladder surgery with risks and complications of surgery including bleeding, hernias, damage to bowel , damage to the bile ducts, risks of anesthesia.  If I get into bleeding that I can not control laparoscopicly, if I get a hole in the bowel or if the anatomy of the bile ducts does not look normal I may have to convert to an open procedure.  Discussed what to expect afterwards, the use of the abdominal binder and no lifting greater than 20 pounds for 3 weeks after surgery. That if done laparoscopically will plan on her going home the same day, but if I have to convert to an open procedure will have patient admitted to the hospital.     Patient is supposed to have MRCP done to look at he pancrease form a small concern on the EUS.     Time spent with the patient with greater that 50% of the time in discussion was 30 minutes.  In discussing the all the procedures and what we have done until today. .      Jamal Beach MD    9/3/2019           RE:      Jena Ibarra  [id number] [place] [address]           Dear Colleague,     Thank you for referring your patient, Jena Ibarra, to the [facility]. Please see a copy of my visit note below.     [facility] Was [facility] by Dr. betancourt and ordered a HIDA scan.,    Patient has right upper quadrant pain now.   SPECIMEN(S):   A: Antral biopsies   B: Stomach body polyp biopsy     FINAL DIAGNOSIS:   A. Stomach, Antrum, Biopsy:   - Gastric antral and body mucosa with no significant inflammation   - No H. pylori like organisms identified on routine staining   - Negative for intestinal metaplasia or dysplasia     B. Stomach, Body, Polypectomy:   Fundic gland polyp      So I was thinking that she probably had gallbladder issues and had stones, but some of her symptoms did not add up to just the gallbladder.  So that is why we did the EGD.    Her ct scan showed. Pancrease divisum and family history of pancreatic cancer so she saw the  gastrointestinal  Doctors.  And is scheduled for an EUS later this month.  Her HIDA scan shows poor gallbladder function.  So would recommend doing the laparoscopic cholecystectomy possible open.   But the patient and I agree that doing the EUS first would be best.       Utrasound shows + stones -dialted ducts -  thickening fo the gallbladder .   Patient denies any symptoms of common bile duct stone, or pancreatitis.  Last colonoscopy was no polyps  In 2017  Assessment:  cholelithiasis with right upper quadrant pain but mostly epigastric pain and happens with nausea and bloating and not from fatty foods.   In fact ate a hamburger and no problems.  So recommend an EGD.  Has a grandson and his mother living with them and this is somewhat stressful.   Did do the Mylanta lidocaine drink and that made her epigastric pain worse.   Will recheck liver function test and lipase.    And will start on prilosec.   Had a tubal ligation and looks like they went above the umbilicus  so may need right upper quadrant trocar.    Plan to do  If needed a laparoscopic cholecystectomy possible open.  But right now her stomach may be more of the problem.   We discussed typical gallbladder symptoms and I drew out a diagram to help discuss how the gallbladder works and what are the symptoms of a gallbladder attack.  I also discussed gallbladder surgery with risks and complications of surgery including bleeding, hernias, damage to bowel , damage to the bile ducts, risks of anesthesia.  If I get into bleeding that I can not control laparoscopicly, if I get a hole in the bowel or if the anatomy of the bile ducts does not look normal I may have to convert to an open procedure.  Discussed what to expect afterwards, the use of the abdominal binder and no lifting greater than 20 pounds for 3 weeks after surgery. That if done laparoscopically will plan on her going home the same day, but if I have to convert to an open procedure will have patient admitted to the hospital.     Time spent with the patient with greater that 50% of the time in discussion was 30 minutes.  In discussing the plan and her latest test results. .        Jamal Beach MD  Examination:  Hepatobiliary scan       Date: 8/30/2019 9:33 AM.     Indication:   Post-prandial abdominal pain/bloating       Additional Information: none     Comparison: CT abdomen/pelvis on 7/30/2019     Technique:     The patient received 6 mCi of Tc-99m Choletec intravenously. Images  were obtained out through 60 minutes.   At 60 minutes the patient  received Shake equaling 28 grams fat given for EF  . An additional 45 minutes of images were obtained after the gall  bladder contraction intervention.     Findings:     There is prompt clearance of the radionuclide from the blood pool into  the liver. By 10 minutes there is clear visualization of the  intrahepatic ducts as well as the upper common bile duct. By 15  minutes there is visualization of the gallbladder. At  60 minutes there  is emptying from the common bile duct into the small bowel.     Gallbladder ejection fraction was measured at 35%.  The normal  gallbladder EF based on a 45 minute infusion is >40%.     Enterogastric reflux was not present.                                                                      Impression:     Findings compatible with gallbladder dyskinesia versus chronic  cholecystitis. No evidence of acute cholecystitis.      =======================     The normal Gall Bladder ejection fraction for a 45 minute infusion is  >40%     I have personally reviewed the examination and initial interpretation  and I agree with the findings.     ZACH RICO MD     Component Collected Lab   Upper EUS 09/18/2019  8:18 AM [facility]   Endoscopy Department-[place]   _______________________________________________________________________________   Patient Name: Jena Ibarra            Procedure Date: 9/18/2019 8:18 AM   MRN: [id number]                       YOB: 1955   Admit Type: Outpatient                Age: 64   Gender: Female                        Note Status: Finalized   Attending MD: Ric Jaeger MD  Instrument Name: EUS Linear [id number]   _______________________________________________________________________________       Procedure:                Upper EUS   Indications:              Abnormal abdominal/pelvic CT scan, ABD pain and                             atrophic pancreas noted in patient with family                             history of pancreatic cancer in 1st deg relative.   Providers:                Ric Jaeger MD, Chelly Melchor   Referring MD:             Jamal Beach MD   Medicines:                Monitored Anesthesia Care   Complications:            No immediate complications.   _______________________________________________________________________________   Procedure:                Pre-Anesthesia  Assessment:                             - Prior to the procedure, a History and Physical                             was performed, and patient medications and                             allergies were reviewed. The patient is competent.                             The risks and benefits of the procedure and the                             sedation options and risks were discussed with the                             patient. All questions were answered and informed                             consent was obtained. Patient identification and                             proposed procedure were verified by the physician                             and the nurse in the pre-procedure area in the                             endoscopy suite. Mental Status Examination: alert                             and oriented. Airway Examination: normal                             oropharyngeal airway and neck mobility and                             Mallampati Class II (the uvula but not tonsillar                             pillars visualized). Respiratory Examination: clear                             to auscultation. CV Examination: normal.                             Prophylactic Antibiotics: The patient does not                             require prophylactic antibiotics. Prior                             Anticoagulants: The patient has taken no previous                             anticoagulant or antiplatelet agents. ASA Grade                             Assessment: II - A patient with mild systemic                             disease. After reviewing the risks and benefits,                             the patient was deemed in satisfactory condition to                             undergo the procedure. The anesthesia plan was to                             use monitored anesthesia care (MAC). Immediately                             prior to administration of medications, the patient                             was  re-assessed for adequacy to receive sedatives.                             The heart rate, respiratory rate, oxygen                             saturations, blood pressure, adequacy of pulmonary                             ventilation, and response to care were monitored                             throughout the procedure. The physical status of                             the patient was re-assessed after the procedure.                             After obtaining informed consent, the endoscope was                             passed under direct vision. Throughout the                             procedure, the patient's blood pressure, pulse, and                             oxygen saturations were monitored continuously. The                             Endoscope was introduced through the mouth, and                             advanced to the second part of duodenum. The upper                             EUS was accomplished without difficulty. The                             patient tolerated the procedure well.                                                                                     Findings:        Limited views of the esophagus were normal. Stomach had small gastric        polyps, previously biopsied. Retroflexion was normal.        Endoscopic Finding :        A 10 mm diverticulum was found in the area of the papilla. Major ampulla        was normal.        Endosonographic Finding :        The pancreas was carefully examined from the gastric station. The        parynchyma of the body of the pancreas was noted to appear abnormal.        There was heterogeneity and stranding throughout the pancreas but no        significant lobularity. The body was followed to the tail with a        complete examination. The pancreatic duct in the body/tail measured 2mm.        The duct had hyperechoic duct walls, was ectatic and had visible side        branches. The scope was advanced to the pancreatic head. The  parynchyma        in the head of the pancreas also had stranding and heteogeneity. The PD        was identified in the head of the pancreas and this measured 2mm. The        CBD was also identified in the head of the pancreas and it was followed        to the hilum and then to the ampulla. The CBD measured 5-6mm in this        region. The CBD and the PD were followed to the ampulla. Pancreas        divisum was present. The CBD was seen to enter the ampulla along with        the minor duct. The major duct of the pancreas appeared to enter the        minor papilla separately. From the duodenal sweep, the PD and CBD were        non-dilated and again there was evidence of pancreas divisum. There was        not clear distinction/border between the dorsal pancreas and the ventral        anlage. Near the ampulla, a subcentimeter focus of hypoechoic        heterogeneity was noted. There was no clear cyst or mass and no changes        in the PD or CBD in this area. This area was thoroughly examined from        the duodenal bulb and there was no significant abnromality noted.        There was no sign of significant endosonographic abnormality in the left        lobe of the liver.        There was no sign of significant endosonographic abnormality in the left        adrenal gland.        One large hyperechoic stone with shadowing was visualized        endosonographically in the gallbladder body. The stone was round.        The left kidney and spleen were visualized endosonographically and were        normal.                                                                                     Impression:               - Pancreas is noted to have hetergeneity and                             stranding. The duct is ectatic, has a hyperechoic                             duct wall and there are visible side branches.                             Findings are overall indeterminate for chronic                             pancreatitis by  [place] criteria.                             - Pancreas divisum anatomy.                             - No masses or cysts in the pancreas. There is a                             small subcentimeter area of hypoechoic                             heterogeneity in the head of the pancreas which is                             likely secondary to the chronic changes in the                             pancreas and small vasculature coursing through                             this area. However, it is difficult to completely                             exclude a small lesion so I suggest follow up                             imaging.                             - Duodenal diverticulum containing the major                             ampulla was noted. Otherwise normal endoscopic                             views..                             - One stone was visualized endosonographically in                             the gallbladder body.                             - No specimens collected.   Recommendation:           - Discharge patient to home (ambulatory).                             - Patient has a contact number available for                             emergencies. The signs and symptoms of potential                             delayed complications were discussed with the                             patient. Return to normal activities tomorrow.                             Written discharge instructions were provided to the                             patient.                             - Follow up with Dr. Vera for cholecystectomy.                             - Recommend MRI + MRCP in 3 months to follow up on                             the minor changes in the pancreatic head.                                                                                         ________________________   Ric Jaeger MD           ASSESSMENT/PLAN:  1. Post-prandial abdominal pain/bloating - differential  includes symptomatic cholelithiasis, irritable bowel, constipation related, etc.  No evidence of PUD, gastritis or GERD on recent EGD.  Less likely pancreatic etiology, although possible.  LFTs wnl on recent labs.  Lipase low normal.  Will give trial of bentyl. Can continue simethicone PRN.  Stop omeprazole as patient reports no improvement. Bowel regimen to prevent constipation. Will also obtain HIDA with [facility].     2. Cholelithiasis     3. Pancreas divisum      4. Family history of pancreatic cancer in first degree relative + mildly atrophic pancreas on CT - will plan for EUS with Dr. Jaeger to better evaluate pancreatic parenchyma.     RTC 4 weeks       Study Result     EXAMINATION: CT ABDOMEN PELVIS W CONTRAST, 7/30/2019 9:20 AM     TECHNIQUE:  Helical CT images from the lung bases through the  symphysis pubis were obtained with IV contrast. Contrast dose: 105 ml  isovue 370     COMPARISON: Correlation made with abdominal ultrasound 4/11/2019     HISTORY: Abd distension; patient with vague bloating and family  history of brother with pancreatic cancer.; Abdominal pain,  generalized; Family history of pancreatic cancer     FINDINGS:     Lung bases: Unremarkable     Abdomen and pelvis: Focal fatty sparing of the liver along the  falciform ligament. Normal spleen. Cholelithiasis without evidence  cholecystitis. Pancreas divisum. Small periampullary duodenal  diverticulum. Pancreas is mildly atrophic. Normal adrenal glands.  Renal cortical cysts. No dilation of the urinary collecting system.  Minimally distended bladder. Multiple phleboliths. No pelvic mass.  Nonobstructive bowel gas pattern. Diverticulosis without  diverticulitis. Degenerative changes of thoracolumbar spine.     Bones and soft tissues: No concerning osseous or soft tissue findings.                                                                      IMPRESSION:   1. Diverticulosis without diverticulitis.  2. Cholelithiasis without  cholecystitis.  3. Focal fatty sparing of the liver.  4. Pancreas divisum without pancreatic mass.     I have personally reviewed the examination and initial interpretation  and I agree with the findings.     ANNE PATIÑO MD           View Image   Component Collected Lab   Upper GI Endoscopy 07/25/2019 12:41 PM Casper Lakhani   [facility]   Endoscopy Department-[place]   _______________________________________________________________________________   Patient Name: Jena Ibarra            Procedure Date: 7/25/2019 12:41 PM   MRN: [id number]                       YOB: 1955   Admit Type: Outpatient                Age: 64   Gender: Female                        Note Status: Finalized   Attending MD: Jamal Beach MD     Instrument Name: GIF  [id number]   _______________________________________________________________________________       Procedure:                Upper GI endoscopy   Indications:              Epigastric abdominal pain, Generalized abdominal                             pain, Nausea   Providers:                Jamal Beach MD, Maximus Yoon   Referring MD:             Jamal Beach MD   Medicines:                Fentanyl 100 micrograms IV, Midazolam 3 mg IV,                             Ondansetron 4 mg IV   Complications:            No immediate complications.   _______________________________________________________________________________   Procedure:                Pre-Anesthesia Assessment:                             - Prior to the procedure, a History and Physical                             was performed, and patient medications and                             allergies were reviewed. The risks and benefits of                             the procedure and the sedation options and risks                             were discussed with the patient. All questions were                             answered and informed consent was obtained.  Patient                             identification and proposed procedure were verified                             by the physician in the pre-procedure area. Mental                             Status Examination: alert and oriented. Airway                             Examination: normal oropharyngeal airway and neck                             mobility. Respiratory Examination: clear to                             auscultation. CV Examination: normal. Prophylactic                             Antibiotics: The patient does not require                             prophylactic antibiotics. Prior Anticoagulants: The                             patient has taken no previous anticoagulant or                             antiplatelet agents. ASA Grade Assessment: II - A                             patient with mild systemic disease. After reviewing                             the risks and benefits, the patient was deemed in                             satisfactory condition to undergo the procedure.                             The anesthesia plan was to use moderate sedation /                             analgesia (conscious sedation). This assessment was                             completed before the administration of sedation at                             12:35 PM.                             After obtaining informed consent, the endoscope was                             passed under direct vision. Throughout the                             procedure, the patient's blood pressure, pulse, and                             oxygen saturations were monitored continuously. The                             Endoscope was introduced through the mouth, and                             advanced to the second part of duodenum. The upper                             GI endoscopy was accomplished without difficulty.                             The patient tolerated the procedure well.                                                                                      Findings:        The examined esophagus was normal.        Diffuse mildly erythematous mucosa without bleeding was found in the        gastric antrum. Biopsies were taken with a cold forceps for Helicobacter        pylori testing.        The ampulla, duodenal bulb, first portion of the duodenum and second        portion of the duodenum were normal.        The exam was otherwise without abnormality.                                                                                     Moderate Sedation:        Moderate (conscious) sedation was administered by the endoscopy nurse        and supervised by the endoscopist. The following parameters were        monitored: oxygen saturation, heart rate, blood pressure, and response        to care. Total physician intraservice time was 11 minutes.   Impression:               - Normal esophagus.                             - Erythematous mucosa in the antrum. Biopsied.                             - Normal ampulla, duodenal bulb, first portion of                             the duodenum and second portion of the duodenum.                             - The examination was otherwise normal.                             ge junction 35 cm with 2-3 cm hiatal hernia   Recommendation:           - Your EGD went well. I did take biopsies of your                             esophagus and stomach. Your stomach does show some                             inflammation, no ulcers. Your esophagus looks                             normal. Please continue your Prilosec and let me                             know if it is helping.                             Since your symptoms do no match up well with your                             gallbladder, we may need to look at other causes.                             Please call [phone] to set up an appointment                             in 1 to 2 weeks to go over the biopsy results. Or                              wait for a letter with the biopsy results in 3-4                             weeks. Please call [phone], if after 5 weeks                             you did not receive your letter.                             There is no evidence of any cancer.                             You should avoid alcohol, Motrin, Advil, ibuprofen                             and aspirin as these can irritate the stomach. For                             reflux, eating small meals, losing weight, and not                             eating several hours before bedtime will help. Also                             avoiding alcohol, tobacco, chocolate, peppermint                             and caffeine will help your reflux symptoms.                             If you are not doing better with just the Prilosec                             we may need to do a ct scan of your abdomen to make                             sure that there is nothing else going on.                             Your liver function test and lipase we did was                             normal.                             Gallbladder can be tricky, but you know what the                             usual symptoms area and that fatty food is the                             culprit.                             Please call Brittanie's number at  if                             getting worse or want to get the ct scan ordered.                             Please follow up with me if you think the symptoms                             are more consistant with gallbladder problems.                                                                                       ___________________   Jamal Beach MD            Office Visit     7/16/2019  [facility]   Jamal Beach MD   Surgery   Epigastric pain   Dx   Abdominal Pain ; Referred by Willy Colmenares MD   Reason for Visit    Progress Notes     Expand All Collapse All    Dear   "Ludmila Torres  I was asked to see this patient by Ludmila Torres for please see below.  I have seen Jena Ibarra and as you know his chief complaint is epigastric pain  Bloating and nausea and nausea with just eating a banana.    Coffee plain upsets her stomach.   Has had some pain in the right upper quadrant area but more in the epigastric area.  Also on the left upper quadrant.       HPI:  Patient is a 64 year old female  with complaints see above  The last episode the patient ate ? which started the symptoms  Patient has family history of gallbladder problems  Mother and sister  Brother had pancreatic cancer  Not eating makes the episode better.  Is taking gasx that helps and sometimes tums.         Review Of Systems  Respiratory: No shortness of breath, dyspnea on exertion, cough, or hemoptysis  Cardiovascular: negative  Gastrointestinal: nausea, constipation and diarrhea  Endocrine: negative  10 point review of systems done and all others are negative other than above.   /86   Pulse 86   Ht 1.676 m (5' 6\")   Wt 78.5 kg (173 lb)   BMI 27.92 kg/m       Past Medical History        Past Medical History:   Diagnosis Date     Diverticulosis       History of colon polyps       Hyperlipidemia LDL goal < 160       Osteopenia              Past Surgical History   Past Surgical History:   Procedure Laterality Date     COLONOSCOPY         COLONOSCOPY WITH CO2 INSUFFLATION N/A 10/13/2014     Procedure: COLONOSCOPY WITH CO2 INSUFFLATION;  Surgeon: Jamal Beach MD;  Location: [facility] OR     COLONOSCOPY WITH CO2 INSUFFLATION N/A 11/14/2017     Procedure: COLONOSCOPY WITH CO2 INSUFFLATION;  COLON-RECTAL BLEEDING / BARUSYA;  Surgeon: Henok Jewell MD;  Location: [facility] OR     TUBAL LIGATION                Social History   Social History            Socioeconomic History     Marital status:        Spouse name: Not on file     Number of children: Not on file     Years of education: " Not on file     Highest education level: Not on file   Occupational History       Employer: [employer]   Social Needs     Financial resource strain: Not on file     Food insecurity:       Worry: Not on file       Inability: Not on file     Transportation needs:       Medical: Not on file       Non-medical: Not on file   Tobacco Use     Smoking status: Never Smoker     Smokeless tobacco: Never Used   Substance and Sexual Activity     Alcohol use: Yes       Comment: occasional     Drug use: No     Sexual activity: Yes       Partners: Male       Birth control/protection: Surgical       Comment: tubal ligation   Lifestyle     Physical activity:       Days per week: Not on file       Minutes per session: Not on file     Stress: Not on file   Relationships     Social connections:       Talks on phone: Not on file       Gets together: Not on file       Attends Christian service: Not on file       Active member of club or organization: Not on file       Attends meetings of clubs or organizations: Not on file       Relationship status: Not on file     Intimate partner violence:       Fear of current or ex partner: Not on file       Emotionally abused: Not on file       Physically abused: Not on file       Forced sexual activity: Not on file   Other Topics Concern     Parent/sibling w/ CABG, MI or angioplasty before 65F 55M? Not Asked   Social History Narrative     Not on file            Current Outpatient Prescriptions   No current outpatient medications on file.         Physical exam:  Patient able to get up on table without difficulty.  Head eyes, nose and mouth within normal limits.  No supraclavicular or cervical adenopathy palpated.  Thyroid within normal limits.  No carotid bruits auscultated.  Lungs are clear to auscultation  Heart is regular rate and rhythm with no murmur or thrills noted.  No costal vertebral angle tenderness noted.  Abdomen is abdomen is soft without significant tenderness, masses, organomegaly or  guarding  bowel sounds are positive and no caput medusa noted.  Mild epigastric discomfort  No obvious hernias noted.  Easily palpable posterior tibial pulse or dorsalis pedis pulse bilaterally.  Lower extremity edema is not present.  Skin warm and pink     Study Result      ULTRASOUND ABDOMEN LIMITED  4/11/2019 11:11 AM      HISTORY: Epigastric abdominal pain.     COMPARISON: None.     FINDINGS:  Liver is mildly fatty infiltrated as evidenced by a diffuse  increase in echogenicity without focal lesions. Gallbladder  demonstrates cholelithiasis without evidence for cholecystitis.  Extrahepatic bile duct is normal in diameter. Pancreas is normal where  visualized. Right kidney is normal in size. There is no  hydronephrosis. Tiny simple renal cysts noted measuring 1 cm.        IMPRESSION:    1. Cholelithiasis without evidence for cholecystitis.   2. Fatty liver.      JASON MAC MD         Labs show:               Exam Date Exam Time Accession # Results    4/9/19  4:35 PM [id number]     Component Value Flag Ref Range Units Status Collected Lab   Bilirubin Direct 0.1    0.0 - 0.2 mg/dL Final 04/09/2019  4:35 PM [facility]   Bilirubin Total 0.6    0.2 - 1.3 mg/dL Final 04/09/2019  4:35 PM [facility]   Albumin 4.2    3.4 - 5.0 g/dL Final 04/09/2019  4:35 PM [facility]   Protein Total 7.6    6.8 - 8.8 g/dL Final 04/09/2019  4:35 PM [facility]   Alkaline Phosphatase 73    40 - 150 U/L Final 04/09/2019  4:35 PM [facility]   ALT 27    0 - 50 U/L Final 04/09/2019  4:35 PM [facility]   AST 17    0 - 45 U/L Final 04/09/2019  4:35 PM [facility]   Lab and Collection      Lipase [LAB99] (Order [id number])   Exam Information                   Exam Date Exam Time Accession # Results    4/9/19  4:35 PM [id number]     Component Value Flag Ref Range Units Status Collected Lab   Lipase 95    73 - 393 U/L Final 04/09/2019  4:35 PM              Utrasound shows + stones -dialted ducts -  thickening fo the gallbladder .   Patient denies any symptoms of common bile duct stone, or  pancreatitis.  Last colonoscopy was no polyps  In 2017  Assessment:  cholelithiasis with right upper quadrant pain but mostly epigastric pain and happens with nausea and bloating and not from fatty foods.   In fact ate a hamburger and no problems.  So recommend an EGD.  Has a grandson and his mother living with them and this is somewhat stressful.   Did do the Mylanta lidocaine drink and that made her epigastric pain worse.   Will recheck liver function test and lipase.    And will start on prilosec.   Had a tubal ligation and looks like they went above the umbilicus so may need right upper quadrant trocar.    Plan to do  If needed a laparoscopic cholecystectomy possible open.  But right now her stomach may be more of the problem.   We discussed typical gallbladder symptoms and I drew out a diagram to help discuss how the gallbladder works and what are the symptoms of a gallbladder attack.  I also discussed gallbladder surgery with risks and complications of surgery including bleeding, hernias, damage to bowel , damage to the bile ducts, risks of anesthesia.  If I get into bleeding that I can not control laparoscopicly, if I get a hole in the bowel or if the anatomy of the bile ducts does not look normal I may have to convert to an open procedure.  Discussed what to expect afterwards, the use of the abdominal binder and no lifting greater than 20 pounds for 3 weeks after surgery. That if done laparoscopically will plan on her going home the same day, but if I have to convert to an open procedure will have patient admitted to the hospital.        Time spent with the patient with greater that 50% of the time in discussion was 33 minutes.     Jamal Beach MD

## 2019-09-24 NOTE — PATIENT INSTRUCTIONS
Please call Brittanie's number at  if getting worse.    When she had the fatty drink she had some sensation of gurgling stomach, but no right upper quadrant pain.    So patient has been having right upper quadrant pain.   Last episode was at 0500, had positive samuel sign as patient did this to herself.    Ate at at 730 the night before. Was roast turkey with gravy.    So I think that her most likely problem is the gallbladder.  So will plan on taking out the gallbladder as planned.     Her cyst on her kidneys are benign.     Had a tubal ligation and looks like they went above the umbilicus so may need right upper quadrant trocar.    Plan to do  If needed a laparoscopic cholecystectomy possible open.  But right now her stomach may be more of the problem.   We discussed typical gallbladder symptoms and I drew out a diagram to help discuss how the gallbladder works and what are the symptoms of a gallbladder attack.  I also discussed gallbladder surgery with risks and complications of surgery including bleeding, hernias, damage to bowel , damage to the bile ducts, risks of anesthesia.  If I get into bleeding that I can not control laparoscopicly, if I get a hole in the bowel or if the anatomy of the bile ducts does not look normal I may have to convert to an open procedure.  Discussed what to expect afterwards, the use of the abdominal binder and no lifting greater than 20 pounds for 3 weeks after surgery. That if done laparoscopically will plan on her going home the same day, but if I have to convert to an open procedure will have patient admitted to the hospital.     Patient is supposed to have MRCP done to look at he pancrease form a small concern on the EUS.     LAPAROSCOPIC CHOLECYSTECTOMY DISCHARGE INSTRUCTIONS  DR. DOMENICA HUSSEIN    1. You may resume your regular diet when you feel you are ready to. DO NOT drink alcoholic beverages for 24 hours of while you are taking prescription medication.    2.  Limit your activities for the first 48 hours. Gradually, increase them as tolerated. You may use stairs. I encourage you to walk as tolerated. No lifting greater that 20 pounds for 3 weeks.    3. You will have some discomfort at the incision sites. This is expected. This should improve over the next 2-3 days. Ice and pain medication will help with this pain. Use prescribed pain medication as instructed.    4. Bruising and mild swelling is normal after surgery. The area below and around the incision(s) will be hard and elevated. This is normal. I call it the healing ridge. This will resolve slowly over the next several months. If you feel the pain is increasing and cannot explain it by increasing activity please call us at [phone].    5. The dressing will often have some blood on it. You may shower 24 hours after surgery. Clean gently over incision site. If clear plastic covering or steri-strip comes off and there is still some bleeding or drainage then cover with gauze or band-aid. If there is no bleeding, there is no reason to cover site. The abdominal binder may be removed after 24 hours after surgery. You may continue to wear it however for comfort. I suggest  you wear an old t-shirt under the abdominal binder for a more comfortable wear.    6. Avoid Aspirin for the first 72 hours after the procedure. This medication may increase the tendency to bleed.    7. Use the following medications (in addition to your normal meds) as shown:  a. Percocet 5 mg 1-2 every 6 hours as needed for pain. This contains 500 mg of Tylenol (acetaminophen) per tablet. Please do not take more than 4 grams of Tylenol (acetaminophen) per day. For example, you may take 1 Percocet and 1 Tylenol, or 2 Percocet and no Tylenol, or 2 Tylenol and no Percocet every 6 hours.  b. Tylenol (acetaminophen) 500 mg every 6 hours as needed for pain. Do not take more than 1000 mg every 6 hours (see above).  c. Motrin (ibuprofen) 200-800 mg every 6  hours as needed for pain. Take with food.    8. Notify Dr. Beach's clinic at [phone] if:    Your discomfort is not relieved by your pain medication.    You have signs of infection such as temperature above 100.5 degrees orally, chills, or increasing daily discomfort.    Incision site is becoming more red and/or there is purulent drainage.    You have questions or concerns.    9. Please call [phone] to schedule a follow up appointment in 2 weeks.    10. When taking narcotics (pain medication more than Tylenol [acetaminophen] and Motrin [ibuprofen]) it is important to keep your stools soft to avoid constipation and pain with straining. This is best done by drinking fluids (non-alcoholic and non-caffeinated) and taking a stool softener (i.e. Metamucil or milk of magnesia). You may be able to use non-narcotics for pain relief especially by the 3rd post- operative day. Tylenol 500 mg  every 6 hours and/or Motrin (ibuprofen) 200-800 mg every 6 hours. Please do not take more than 4 grams of Tylenol (acetaminophen) per day. Remember your Percocet does have Tylenol (acetaminophen) already in it. Please take Motrin (ibuprofen) with food to help protect the stomach. If you have a history of stomach ulcers or stomach problems, do not take Motrin (ibuprofen).    11. Do not drive or operate heavy machinery for 24 hours after surgery or when taking narcotics. You may resume driving when feel that you can safely avoid an accident and are not taking narcotics. This is usually 5 to 7 days after surgery. You should not be alone for 24 hours after surgery.    12. Bile is important for breaking down fatty foods. Excessive fatty foods may cause diarrhea. Please be aware of this when you first start eating fatty foods.    13. Have milk of magnesia available at home so that when you take the pain medications you take 1-2 teaspoons a day,  to help reduce problems with constipation.

## 2019-09-24 NOTE — LETTER
9/24/2019         RE: Jena Ibarra  [address] [place] [address]        Dear Colleague,    Thank you for referring your patient, Jena Ibarra, to the [facility]. Please see a copy of my visit note below.        Please call Brittanie's number at  if getting worse.    When she had the fatty drink she had some sensation of gurgling stomach, but no right upper quadrant pain.    So patient has been having right upper quadrant pain.   Last episode was at 0500, had positive samuel sign as patient did this to herself.    Ate at at 730 the night before. Was roast turkey with gravy.    So I think that her most likely problem is the gallbladder.  So will plan on taking out the gallbladder as planned.     Her cyst on her kidneys are benign.     Had a tubal ligation and looks like they went above the umbilicus so may need right upper quadrant trocar.    Plan to do  If needed a laparoscopic cholecystectomy possible open.  But right now her stomach may be more of the problem.   We discussed typical gallbladder symptoms and I drew out a diagram to help discuss how the gallbladder works and what are the symptoms of a gallbladder attack.  I also discussed gallbladder surgery with risks and complications of surgery including bleeding, hernias, damage to bowel , damage to the bile ducts, risks of anesthesia.  If I get into bleeding that I can not control laparoscopicly, if I get a hole in the bowel or if the anatomy of the bile ducts does not look normal I may have to convert to an open procedure.  Discussed what to expect afterwards, the use of the abdominal binder and no lifting greater than 20 pounds for 3 weeks after surgery. That if done laparoscopically will plan on her going home the same day, but if I have to convert to an open procedure will have patient admitted to the hospital.     Patient is supposed to have MRCP done to look at he pancrease form a small concern on the EUS.     Time  spent with the patient with greater that 50% of the time in discussion was 30 minutes.  In discussing the all the procedures and what we have done until today. .      Jamal Beach MD    9/3/2019           RE:      Jena Ibarra  [address] [place] [address]           Dear Colleague,     Thank you for referring your patient, Jena Ibarra, to the [facility]. Please see a copy of my visit note below.     Patient had no real help from the [facility]. Was seen by Dr. betancourt and ordered a HIDA scan.,    Patient has right upper quadrant pain now.   SPECIMEN(S):   A: Antral biopsies   B: Stomach body polyp biopsy     FINAL DIAGNOSIS:   A. Stomach, Antrum, Biopsy:   - Gastric antral and body mucosa with no significant inflammation   - No H. pylori like organisms identified on routine staining   - Negative for intestinal metaplasia or dysplasia     B. Stomach, Body, Polypectomy:   Fundic gland polyp      So I was thinking that she probably had gallbladder issues and had stones, but some of her symptoms did not add up to just the gallbladder.  So that is why we did the EGD.    Her ct scan showed. Pancrease divisum and family history of pancreatic cancer so she saw the  gastrointestinal  Doctors.  And is scheduled for an EUS later this month.  Her HIDA scan shows poor gallbladder function.  So would recommend doing the laparoscopic cholecystectomy possible open.   But the patient and I agree that doing the EUS first would be best.       Utrasound shows + stones -dialted ducts -  thickening fo the gallbladder .   Patient denies any symptoms of common bile duct stone, or pancreatitis.  Last colonoscopy was no polyps  In 2017  Assessment:  cholelithiasis with right upper quadrant pain but mostly epigastric pain and happens with nausea and bloating and not from fatty foods.   In fact ate a hamburger and no problems.  So recommend an EGD.  Has a grandson and his mother living with them and this is  somewhat stressful.   Did do the Mylanta lidocaine drink and that made her epigastric pain worse.   Will recheck liver function test and lipase.    And will start on prilosec.   Had a tubal ligation and looks like they went above the umbilicus so may need right upper quadrant trocar.    Plan to do  If needed a laparoscopic cholecystectomy possible open.  But right now her stomach may be more of the problem.   We discussed typical gallbladder symptoms and I drew out a diagram to help discuss how the gallbladder works and what are the symptoms of a gallbladder attack.  I also discussed gallbladder surgery with risks and complications of surgery including bleeding, hernias, damage to bowel , damage to the bile ducts, risks of anesthesia.  If I get into bleeding that I can not control laparoscopicly, if I get a hole in the bowel or if the anatomy of the bile ducts does not look normal I may have to convert to an open procedure.  Discussed what to expect afterwards, the use of the abdominal binder and no lifting greater than 20 pounds for 3 weeks after surgery. That if done laparoscopically will plan on her going home the same day, but if I have to convert to an open procedure will have patient admitted to the hospital.     Time spent with the patient with greater that 50% of the time in discussion was 30 minutes.  In discussing the plan and her latest test results. .        Jamal Beach MD  Examination:  Hepatobiliary scan       Date: 8/30/2019 9:33 AM.     Indication:   Post-prandial abdominal pain/bloating       Additional Information: none     Comparison: CT abdomen/pelvis on 7/30/2019     Technique:     The patient received 6 mCi of Tc-99m Choletec intravenously. Images  were obtained out through 60 minutes.   At 60 minutes the patient  received Shake equaling 28 grams fat given for EF  . An additional 45 minutes of images were obtained after the gall  bladder contraction intervention.     Findings:     There is  prompt clearance of the radionuclide from the blood pool into  the liver. By 10 minutes there is clear visualization of the  intrahepatic ducts as well as the upper common bile duct. By 15  minutes there is visualization of the gallbladder. At 60 minutes there  is emptying from the common bile duct into the small bowel.     Gallbladder ejection fraction was measured at 35%.  The normal  gallbladder EF based on a 45 minute infusion is >40%.     Enterogastric reflux was not present.                                                                      Impression:     Findings compatible with gallbladder dyskinesia versus chronic  cholecystitis. No evidence of acute cholecystitis.      =======================     The normal Gall Bladder ejection fraction for a 45 minute infusion is  >40%     I have personally reviewed the examination and initial interpretation  and I agree with the findings.     ZACH RICO MD     Component Collected Lab   Upper EUS 09/18/2019  8:18 AM [facility]   Endoscopy Department-[place]   _______________________________________________________________________________   Patient Name: Jena Ibarra            Procedure Date: 9/18/2019 8:18 AM   MRN: [id number]                       YOB: 1955   Admit Type: Outpatient                Age: 64   Gender: Female                        Note Status: Finalized   Attending MD: Ric Jaeger MD  Instrument Name: EUS Linear [id number]   _______________________________________________________________________________       Procedure:                Upper EUS   Indications:              Abnormal abdominal/pelvic CT scan, ABD pain and                             atrophic pancreas noted in patient with family                             history of pancreatic cancer in 1st deg relative.   Providers:                Ric Jaeger MD, Chelly Melchor   Referring MD:             Jamal  MD Yong   Medicines:                Monitored Anesthesia Care   Complications:            No immediate complications.   _______________________________________________________________________________   Procedure:                Pre-Anesthesia Assessment:                             - Prior to the procedure, a History and Physical                             was performed, and patient medications and                             allergies were reviewed. The patient is competent.                             The risks and benefits of the procedure and the                             sedation options and risks were discussed with the                             patient. All questions were answered and informed                             consent was obtained. Patient identification and                             proposed procedure were verified by the physician                             and the nurse in the pre-procedure area in the                             endoscopy suite. Mental Status Examination: alert                             and oriented. Airway Examination: normal                             oropharyngeal airway and neck mobility and                             Mallampati Class II (the uvula but not tonsillar                             pillars visualized). Respiratory Examination: clear                             to auscultation. CV Examination: normal.                             Prophylactic Antibiotics: The patient does not                             require prophylactic antibiotics. Prior                             Anticoagulants: The patient has taken no previous                             anticoagulant or antiplatelet agents. ASA Grade                             Assessment: II - A patient with mild systemic                             disease. After reviewing the risks and benefits,                             the patient was deemed in satisfactory condition to                              undergo the procedure. The anesthesia plan was to                             use monitored anesthesia care (MAC). Immediately                             prior to administration of medications, the patient                             was re-assessed for adequacy to receive sedatives.                             The heart rate, respiratory rate, oxygen                             saturations, blood pressure, adequacy of pulmonary                             ventilation, and response to care were monitored                             throughout the procedure. The physical status of                             the patient was re-assessed after the procedure.                             After obtaining informed consent, the endoscope was                             passed under direct vision. Throughout the                             procedure, the patient's blood pressure, pulse, and                             oxygen saturations were monitored continuously. The                             Endoscope was introduced through the mouth, and                             advanced to the second part of duodenum. The upper                             EUS was accomplished without difficulty. The                             patient tolerated the procedure well.                                                                                     Findings:        Limited views of the esophagus were normal. Stomach had small gastric        polyps, previously biopsied. Retroflexion was normal.        Endoscopic Finding :        A 10 mm diverticulum was found in the area of the papilla. Major ampulla        was normal.        Endosonographic Finding :        The pancreas was carefully examined from the gastric station. The        parynchyma of the body of the pancreas was noted to appear abnormal.        There was heterogeneity and stranding throughout the pancreas but no        significant lobularity. The body was followed to the  tail with a        complete examination. The pancreatic duct in the body/tail measured 2mm.        The duct had hyperechoic duct walls, was ectatic and had visible side        branches. The scope was advanced to the pancreatic head. The parynchyma        in the head of the pancreas also had stranding and heteogeneity. The PD        was identified in the head of the pancreas and this measured 2mm. The        CBD was also identified in the head of the pancreas and it was followed        to the hilum and then to the ampulla. The CBD measured 5-6mm in this        region. The CBD and the PD were followed to the ampulla. Pancreas        divisum was present. The CBD was seen to enter the ampulla along with        the minor duct. The major duct of the pancreas appeared to enter the        minor papilla separately. From the duodenal sweep, the PD and CBD were        non-dilated and again there was evidence of pancreas divisum. There was        not clear distinction/border between the dorsal pancreas and the ventral        anlage. Near the ampulla, a subcentimeter focus of hypoechoic        heterogeneity was noted. There was no clear cyst or mass and no changes        in the PD or CBD in this area. This area was thoroughly examined from        the duodenal bulb and there was no significant abnromality noted.        There was no sign of significant endosonographic abnormality in the left        lobe of the liver.        There was no sign of significant endosonographic abnormality in the left        adrenal gland.        One large hyperechoic stone with shadowing was visualized        endosonographically in the gallbladder body. The stone was round.        The left kidney and spleen were visualized endosonographically and were        normal.                                                                                     Impression:               - Pancreas is noted to have hetergeneity and                              stranding. The duct is ectatic, has a hyperechoic                             duct wall and there are visible side branches.                             Findings are overall indeterminate for chronic                             pancreatitis by Peralta criteria.                             - Pancreas divisum anatomy.                             - No masses or cysts in the pancreas. There is a                             small subcentimeter area of hypoechoic                             heterogeneity in the head of the pancreas which is                             likely secondary to the chronic changes in the                             pancreas and small vasculature coursing through                             this area. However, it is difficult to completely                             exclude a small lesion so I suggest follow up                             imaging.                             - Duodenal diverticulum containing the major                             ampulla was noted. Otherwise normal endoscopic                             views..                             - One stone was visualized endosonographically in                             the gallbladder body.                             - No specimens collected.   Recommendation:           - Discharge patient to home (ambulatory).                             - Patient has a contact number available for                             emergencies. The signs and symptoms of potential                             delayed complications were discussed with the                             patient. Return to normal activities tomorrow.                             Written discharge instructions were provided to the                             patient.                             - Follow up with Dr. Vera for cholecystectomy.                             - Recommend MRI + MRCP in 3 months to follow up on                             the minor changes in the  pancreatic head.                                                                                         ________________________   Ric Jaeger MD           ASSESSMENT/PLAN:  1. Post-prandial abdominal pain/bloating - differential includes symptomatic cholelithiasis, irritable bowel, constipation related, etc.  No evidence of PUD, gastritis or GERD on recent EGD.  Less likely pancreatic etiology, although possible.  LFTs wnl on recent labs.  Lipase low normal.  Will give trial of bentyl. Can continue simethicone PRN.  Stop omeprazole as patient reports no improvement. Bowel regimen to prevent constipation. Will also obtain HIDA with [facility].     2. Cholelithiasis     3. Pancreas divisum      4. Family history of pancreatic cancer in first degree relative + mildly atrophic pancreas on CT - will plan for EUS with Dr. Jaeger to better evaluate pancreatic parenchyma.     RTC 4 weeks       Study Result     EXAMINATION: CT ABDOMEN PELVIS W CONTRAST, 7/30/2019 9:20 AM     TECHNIQUE:  Helical CT images from the lung bases through the  symphysis pubis were obtained with IV contrast. Contrast dose: 105 ml  isovue 370     COMPARISON: Correlation made with abdominal ultrasound 4/11/2019     HISTORY: Abd distension; patient with vague bloating and family  history of brother with pancreatic cancer.; Abdominal pain,  generalized; Family history of pancreatic cancer     FINDINGS:     Lung bases: Unremarkable     Abdomen and pelvis: Focal fatty sparing of the liver along the  falciform ligament. Normal spleen. Cholelithiasis without evidence  cholecystitis. Pancreas divisum. Small periampullary duodenal  diverticulum. Pancreas is mildly atrophic. Normal adrenal glands.  Renal cortical cysts. No dilation of the urinary collecting system.  Minimally distended bladder. Multiple phleboliths. No pelvic mass.  Nonobstructive bowel gas pattern. Diverticulosis without  diverticulitis. Degenerative changes of thoracolumbar  spine.     Bones and soft tissues: No concerning osseous or soft tissue findings.                                                                      IMPRESSION:   1. Diverticulosis without diverticulitis.  2. Cholelithiasis without cholecystitis.  3. Focal fatty sparing of the liver.  4. Pancreas divisum without pancreatic mass.     I have personally reviewed the examination and initial interpretation  and I agree with the findings.     ANNE PATIÑO MD           View Image   Component Collected Lab   Upper GI Endoscopy 07/25/2019 12:41 PM [facility]   Endoscopy Department-[place]   _______________________________________________________________________________   Patient Name: Jena Ibarra            Procedure Date: 7/25/2019 12:41 PM   MRN: [id number]                       YOB: 1955   Admit Type: Outpatient                Age: 64   Gender: Female                        Note Status: Finalized   Attending MD: Jamal Beach MD     Instrument Name: GIF  [id number]   _______________________________________________________________________________       Procedure:                Upper GI endoscopy   Indications:              Epigastric abdominal pain, Generalized abdominal                             pain, Nausea   Providers:                Jamal Beach MD, Maximus Yoon   Referring MD:             Jamal Beach MD   Medicines:                Fentanyl 100 micrograms IV, Midazolam 3 mg IV,                             Ondansetron 4 mg IV   Complications:            No immediate complications.   _______________________________________________________________________________   Procedure:                Pre-Anesthesia Assessment:                             - Prior to the procedure, a History and Physical                             was performed, and patient medications and                             allergies were reviewed. The risks and benefits of                              the procedure and the sedation options and risks                             were discussed with the patient. All questions were                             answered and informed consent was obtained. Patient                             identification and proposed procedure were verified                             by the physician in the pre-procedure area. Mental                             Status Examination: alert and oriented. Airway                             Examination: normal oropharyngeal airway and neck                             mobility. Respiratory Examination: clear to                             auscultation. CV Examination: normal. Prophylactic                             Antibiotics: The patient does not require                             prophylactic antibiotics. Prior Anticoagulants: The                             patient has taken no previous anticoagulant or                             antiplatelet agents. ASA Grade Assessment: II - A                             patient with mild systemic disease. After reviewing                             the risks and benefits, the patient was deemed in                             satisfactory condition to undergo the procedure.                             The anesthesia plan was to use moderate sedation /                             analgesia (conscious sedation). This assessment was                             completed before the administration of sedation at                             12:35 PM.                             After obtaining informed consent, the endoscope was                             passed under direct vision. Throughout the                             procedure, the patient's blood pressure, pulse, and                             oxygen saturations were monitored continuously. The                             Endoscope was introduced through the mouth, and                             advanced to the  second part of duodenum. The upper                             GI endoscopy was accomplished without difficulty.                             The patient tolerated the procedure well.                                                                                     Findings:        The examined esophagus was normal.        Diffuse mildly erythematous mucosa without bleeding was found in the        gastric antrum. Biopsies were taken with a cold forceps for Helicobacter        pylori testing.        The ampulla, duodenal bulb, first portion of the duodenum and second        portion of the duodenum were normal.        The exam was otherwise without abnormality.                                                                                     Moderate Sedation:        Moderate (conscious) sedation was administered by the endoscopy nurse        and supervised by the endoscopist. The following parameters were        monitored: oxygen saturation, heart rate, blood pressure, and response        to care. Total physician intraservice time was 11 minutes.   Impression:               - Normal esophagus.                             - Erythematous mucosa in the antrum. Biopsied.                             - Normal ampulla, duodenal bulb, first portion of                             the duodenum and second portion of the duodenum.                             - The examination was otherwise normal.                             ge junction 35 cm with 2-3 cm hiatal hernia   Recommendation:           - Your EGD went well. I did take biopsies of your                             esophagus and stomach. Your stomach does show some                             inflammation, no ulcers. Your esophagus looks                             normal. Please continue your Prilosec and let me                             know if it is helping.                             Since your symptoms do no match up well with your                              gallbladder, we may need to look at other causes.                             Please call [phone] to set up an appointment                             in 1 to 2 weeks to go over the biopsy results. Or                             wait for a letter with the biopsy results in 3-4                             weeks. Please call [phone], if after 5 weeks                             you did not receive your letter.                             There is no evidence of any cancer.                             You should avoid alcohol, Motrin, Advil, ibuprofen                             and aspirin as these can irritate the stomach. For                             reflux, eating small meals, losing weight, and not                             eating several hours before bedtime will help. Also                             avoiding alcohol, tobacco, chocolate, peppermint                             and caffeine will help your reflux symptoms.                             If you are not doing better with just the Prilosec                             we may need to do a ct scan of your abdomen to make                             sure that there is nothing else going on.                             Your liver function test and lipase we did was                             normal.                             Gallbladder can be tricky, but you know what the                             usual symptoms area and that fatty food is the                             culprit.                             Please call Brittanie's number at  if                             getting worse or want to get the ct scan ordered.                             Please follow up with me if you think the symptoms                             are more consistant with gallbladder problems.                                                                                       ___________________   Jamal Beach MD            Office Visit    "  7/16/2019  [facility] Jamal Vera MD   Surgery   Epigastric pain   Dx   Abdominal Pain ; Referred by Willy Colmenares MD   Reason for Visit    Progress Notes     Expand All Collapse All    Dear Ludmila Garcia  I was asked to see this patient by Ludmila Torres for please see below.  I have seen Jena Ibarra and as you know his chief complaint is epigastric pain  Bloating and nausea and nausea with just eating a banana.    Coffee plain upsets her stomach.   Has had some pain in the right upper quadrant area but more in the epigastric area.  Also on the left upper quadrant.       HPI:  Patient is a 64 year old female  with complaints see above  The last episode the patient ate ? which started the symptoms  Patient has family history of gallbladder problems  Mother and sister  Brother had pancreatic cancer  Not eating makes the episode better.  Is taking gasx that helps and sometimes tums.         Review Of Systems  Respiratory: No shortness of breath, dyspnea on exertion, cough, or hemoptysis  Cardiovascular: negative  Gastrointestinal: nausea, constipation and diarrhea  Endocrine: negative  10 point review of systems done and all others are negative other than above.   /86   Pulse 86   Ht 1.676 m (5' 6\")   Wt 78.5 kg (173 lb)   BMI 27.92 kg/m       Past Medical History        Past Medical History:   Diagnosis Date     Diverticulosis       History of colon polyps       Hyperlipidemia LDL goal < 160       Osteopenia              Past Surgical History   Past Surgical History:   Procedure Laterality Date     COLONOSCOPY         COLONOSCOPY WITH CO2 INSUFFLATION N/A 10/13/2014     Procedure: COLONOSCOPY WITH CO2 INSUFFLATION;  Surgeon: Jamal Beach MD;  Location: [facility] OR     COLONOSCOPY WITH CO2 INSUFFLATION N/A 11/14/2017     Procedure: COLONOSCOPY WITH CO2 INSUFFLATION;  COLON-RECTAL BLEEDING / BARUSYA;  Surgeon: Henok Jewell MD;  " Location: [facility] OR     TUBAL LIGATION                Social History   Social History            Socioeconomic History     Marital status:        Spouse name: Not on file     Number of children: Not on file     Years of education: Not on file     Highest education level: Not on file   Occupational History       Employer: [employer]   Social Needs     Financial resource strain: Not on file     Food insecurity:       Worry: Not on file       Inability: Not on file     Transportation needs:       Medical: Not on file       Non-medical: Not on file   Tobacco Use     Smoking status: Never Smoker     Smokeless tobacco: Never Used   Substance and Sexual Activity     Alcohol use: Yes       Comment: occasional     Drug use: No     Sexual activity: Yes       Partners: Male       Birth control/protection: Surgical       Comment: tubal ligation   Lifestyle     Physical activity:       Days per week: Not on file       Minutes per session: Not on file     Stress: Not on file   Relationships     Social connections:       Talks on phone: Not on file       Gets together: Not on file       Attends [employer] service: Not on file       Active member of club or organization: Not on file       Attends meetings of clubs or organizations: Not on file       Relationship status: Not on file     Intimate partner violence:       Fear of current or ex partner: Not on file       Emotionally abused: Not on file       Physically abused: Not on file       Forced sexual activity: Not on file   Other Topics Concern     Parent/sibling w/ CABG, MI or angioplasty before 65F 55M? Not Asked   Social History Narrative     Not on file            Current Outpatient Prescriptions   No current outpatient medications on file.         Physical exam:  Patient able to get up on table without difficulty.  Head eyes, nose and mouth within normal limits.  No supraclavicular or cervical adenopathy palpated.  Thyroid within normal limits.  No carotid bruits  auscultated.  Lungs are clear to auscultation  Heart is regular rate and rhythm with no murmur or thrills noted.  No costal vertebral angle tenderness noted.  Abdomen is abdomen is soft without significant tenderness, masses, organomegaly or guarding  bowel sounds are positive and no caput medusa noted.  Mild epigastric discomfort  No obvious hernias noted.  Easily palpable posterior tibial pulse or dorsalis pedis pulse bilaterally.  Lower extremity edema is not present.  Skin warm and pink     Study Result      ULTRASOUND ABDOMEN LIMITED  4/11/2019 11:11 AM      HISTORY: Epigastric abdominal pain.     COMPARISON: None.     FINDINGS:  Liver is mildly fatty infiltrated as evidenced by a diffuse  increase in echogenicity without focal lesions. Gallbladder  demonstrates cholelithiasis without evidence for cholecystitis.  Extrahepatic bile duct is normal in diameter. Pancreas is normal where  visualized. Right kidney is normal in size. There is no  hydronephrosis. Tiny simple renal cysts noted measuring 1 cm.        IMPRESSION:    1. Cholelithiasis without evidence for cholecystitis.   2. Fatty liver.      JASON MAC MD         Labs show:               Exam Date Exam Time Accession # Results    4/9/19  4:35 PM [id number]     Component Value Flag Ref Range Units Status Collected Lab   Bilirubin Direct 0.1    0.0 - 0.2 mg/dL Final 04/09/2019  4:35 PM [facility]   Bilirubin Total 0.6    0.2 - 1.3 mg/dL Final 04/09/2019  4:35 PM [facility]   Albumin 4.2    3.4 - 5.0 g/dL Final 04/09/2019  4:35 PM [facility]   Protein Total 7.6    6.8 - 8.8 g/dL Final 04/09/2019  4:35 PM [facility]   Alkaline Phosphatase 73    40 - 150 U/L Final 04/09/2019  4:35 PM [facility]   ALT 27    0 - 50 U/L Final 04/09/2019  4:35 PM [facility]   AST 17    0 - 45 U/L Final 04/09/2019  4:35 PM [facility]   Lab and Collection      Lipase [LAB99] (Order [id number])   Exam Information                   Exam Date Exam Time Accession # Results    4/9/19  4:35 PM [id number]     Component Value  Flag Ref Range Units Status Collected Lab   Lipase 95    73 - 393 U/L Final 04/09/2019  4:35 PM              Utrasound shows + stones -dialted ducts -  thickening fo the gallbladder .   Patient denies any symptoms of common bile duct stone, or pancreatitis.  Last colonoscopy was no polyps  In 2017  Assessment:  cholelithiasis with right upper quadrant pain but mostly epigastric pain and happens with nausea and bloating and not from fatty foods.   In fact ate a hamburger and no problems.  So recommend an EGD.  Has a grandson and his mother living with them and this is somewhat stressful.   Did do the Mylanta lidocaine drink and that made her epigastric pain worse.   Will recheck liver function test and lipase.    And will start on prilosec.   Had a tubal ligation and looks like they went above the umbilicus so may need right upper quadrant trocar.    Plan to do  If needed a laparoscopic cholecystectomy possible open.  But right now her stomach may be more of the problem.   We discussed typical gallbladder symptoms and I drew out a diagram to help discuss how the gallbladder works and what are the symptoms of a gallbladder attack.  I also discussed gallbladder surgery with risks and complications of surgery including bleeding, hernias, damage to bowel , damage to the bile ducts, risks of anesthesia.  If I get into bleeding that I can not control laparoscopicly, if I get a hole in the bowel or if the anatomy of the bile ducts does not look normal I may have to convert to an open procedure.  Discussed what to expect afterwards, the use of the abdominal binder and no lifting greater than 20 pounds for 3 weeks after surgery. That if done laparoscopically will plan on her going home the same day, but if I have to convert to an open procedure will have patient admitted to the hospital.        Time spent with the patient with greater that 50% of the time in discussion was 33 minutes.     Jamal Beach MD          Again,  thank you for allowing me to participate in the care of your patient.        Sincerely,        Jamal Beach MD

## 2019-09-27 ENCOUNTER — OFFICE VISIT (OUTPATIENT)
Dept: FAMILY MEDICINE | Facility: CLINIC | Age: 64
End: 2019-09-27
Payer: COMMERCIAL

## 2019-09-27 VITALS
OXYGEN SATURATION: 97 % | DIASTOLIC BLOOD PRESSURE: 81 MMHG | WEIGHT: 169 LBS | RESPIRATION RATE: 18 BRPM | BODY MASS INDEX: 27.16 KG/M2 | SYSTOLIC BLOOD PRESSURE: 120 MMHG | HEART RATE: 90 BPM | HEIGHT: 66 IN

## 2019-09-27 DIAGNOSIS — Z01.818 PREOP GENERAL PHYSICAL EXAM: ICD-10-CM

## 2019-09-27 DIAGNOSIS — Z23 NEED FOR TDAP VACCINATION: ICD-10-CM

## 2019-09-27 DIAGNOSIS — K82.8 BILIARY DYSKINESIA: Primary | ICD-10-CM

## 2019-09-27 PROCEDURE — 99214 OFFICE O/P EST MOD 30 MIN: CPT | Mod: 25 | Performed by: PHYSICIAN ASSISTANT

## 2019-09-27 PROCEDURE — 90715 TDAP VACCINE 7 YRS/> IM: CPT | Performed by: PHYSICIAN ASSISTANT

## 2019-09-27 PROCEDURE — 90471 IMMUNIZATION ADMIN: CPT | Performed by: PHYSICIAN ASSISTANT

## 2019-09-27 ASSESSMENT — MIFFLIN-ST. JEOR: SCORE: 1323.33

## 2019-09-27 NOTE — PROGRESS NOTES
[facility] [place]  [address]  [place] [address]  [phone]  Dept: [phone]    PRE-OP EVALUATION:  Today's date: 2019    Jena Ibarra (: 1955) presents for pre-operative evaluation assessment as requested by Dr. Beach.  She requires evaluation and anesthesia risk assessment prior to undergoing surgery/procedure for treatment of gallbladder .    Proposed Surgery/ Procedure: cholecyctectomy  Date of Surgery/ Procedure: 10/2/19  Time of Surgery/ Procedure: 11am  Hospital/Surgical Facility: [facility]  Fax number for surgical facility:   Primary Physician: Ludmila Torres [place]  Type of Anesthesia Anticipated: General    Patient has a Health Care Directive or Living Will:  YES     1. NO - Do you have a history of heart attack, stroke, stent, bypass or surgery on an artery in the head, neck, heart or legs?  2. NO - Do you ever have any pain or discomfort in your chest?  3. NO - Do you have a history of  Heart Failure?  4. NO - Are you troubled by shortness of breath when: walking on the level, up a slight hill or at night?  5. NO - Do you currently have a cold, bronchitis or other respiratory infection?  6. NO - Do you have a cough, shortness of breath or wheezing?  7. NO - Do you sometimes get pains in the calves of your legs when you walk?  8. NO - Do you or anyone in your family have previous history of blood clots?  9. NO - Do you or does anyone in your family have a serious bleeding problem such as prolonged bleeding following surgeries or cuts?  10. NO - Have you ever had problems with anemia or been told to take iron pills?  11. NO - Have you had any abnormal blood loss such as black, tarry or bloody stools, or abnormal vaginal bleeding?  12. NO - Have you ever had a blood transfusion?  13. NO - Have you or any of your relatives ever had problems with anesthesia?  14. NO - Do you have sleep apnea, excessive snoring or daytime drowsiness?  15. NO - Do  you have any prosthetic heart valves?  16. NO - Do you have prosthetic joints?  17. NO - Is there any chance that you may be pregnant?      HPI:     HPI related to upcoming procedure:  ongoing TAHMINA abdominal pain and postprandial nausea and bloating. suspected to be related to biliary dyskinesia       See problem list for active medical problems.  Problems all longstanding and stable, except as noted/documented.  See ROS for pertinent symptoms related to these conditions.      MEDICAL HISTORY:     Patient Active Problem List    Diagnosis Date Noted     RUQ abdominal pain 09/03/2019     Priority: Medium     Gallstones 09/03/2019     Priority: Medium     Abnormal biliary HIDA scan 09/03/2019     Priority: Medium     Atrophic vaginitis 12/01/2014     Priority: Medium     Radial styloid tenosynovitis 04/18/2013     Priority: Medium     CTS (carpal tunnel syndrome) 04/18/2013     Priority: Medium     Advanced directives, counseling/discussion 04/17/2013     Priority: Medium     Advance Care Planning: Pt does have living well and will bring in a copy. Valarie Valarie DAWN               Wrist pain 12/17/2012     Priority: Medium     CARDIOVASCULAR SCREENING; LDL GOAL LESS THAN 160 10/31/2010     Priority: Medium      Past Medical History:   Diagnosis Date     Diverticulosis      History of colon polyps      Hyperlipidemia LDL goal < 160      Osteopenia      Past Surgical History:   Procedure Laterality Date     COLONOSCOPY       COLONOSCOPY WITH CO2 INSUFFLATION N/A 10/13/2014    Procedure: COLONOSCOPY WITH CO2 INSUFFLATION;  Surgeon: Jamal Beach MD;  Location:  OR     COLONOSCOPY WITH CO2 INSUFFLATION N/A 11/14/2017    Procedure: COLONOSCOPY WITH CO2 INSUFFLATION;  COLON-RECTAL BLEEDING / BARUSYA;  Surgeon: Henok Jewell MD;  Location:  OR     COMBINED ESOPHAGOSCOPY, GASTROSCOPY, DUODENOSCOPY (EGD) WITH CO2 INSUFFLATION N/A 7/25/2019    Procedure: ESOPHAGOGASTRODUODENOSCOPY, WITH CO2 INSUFFLATION;   "Surgeon: Jamal Beach MD;  Location: [facility] OR     ESOPHAGOSCOPY, GASTROSCOPY, DUODENOSCOPY (EGD), COMBINED N/A 7/25/2019    Procedure: Esophagogastroduodenoscopy, With Biopsy;  Surgeon: Jamal Beach MD;  Location: [facility] OR     TUBAL LIGATION       No current outpatient medications on file.     OTC products: None, except as noted above    No Known Allergies   Latex Allergy: NO    Social History     Tobacco Use     Smoking status: Never Smoker     Smokeless tobacco: Never Used   Substance Use Topics     Alcohol use: Yes     Comment: occasional     History   Drug Use No       REVIEW OF SYSTEMS:   Constitutional, neuro, ENT, endocrine, pulmonary, cardiac, gastrointestinal, genitourinary, musculoskeletal, integument and psychiatric systems are negative, except as otherwise noted.    EXAM:   /81   Pulse 90   Resp 18   Ht 1.676 m (5' 6\")   Wt 76.7 kg (169 lb)   SpO2 97%   BMI 27.28 kg/m      GENERAL APPEARANCE: healthy, alert and no distress     EYES: EOMI, PERRL     HENT: ear canals and TM's normal and nose and mouth without ulcers or lesions     NECK: no adenopathy, no asymmetry, masses, or scars and thyroid normal to palpation     RESP: lungs clear to auscultation - no rales, rhonchi or wheezes     CV: regular rates and rhythm, normal S1 S2, no S3 or S4 and no murmur, click or rub     ABDOMEN: mild tenderness in the epigastric and RUQ area, without rebound, guarding, mass or organomegaly. Abdomen is soft and bowel sounds are normal.       MS: extremities normal- no gross deformities noted, no evidence of inflammation in joints, FROM in all extremities.     SKIN: no suspicious lesions or rashes     NEURO: Normal strength and tone, sensory exam grossly normal, mentation intact and speech normal     PSYCH: mentation appears normal. and affect normal/bright     LYMPHATICS: No cervical adenopathy    DIAGNOSTICS:   See ekg from 8/27/19    Recent Labs   Lab Test 04/09/19  1635 10/13/17  1554 " 09/04/14  1349   HGB 13.6 12.2 11.7    230 247   NA  --  140 140   POTASSIUM  --  4.0 4.1   CR  --  0.83 0.93        IMPRESSION:       The proposed surgical procedure is considered INTERMEDIATE risk.    REVISED CARDIAC RISK INDEX  The patient has the following serious cardiovascular risks for perioperative complications such as (MI, PE, VFib and 3  AV Block):  No serious cardiac risks  INTERPRETATION: 0 risks: Class I (very low risk - 0.4% complication rate)    The patient has the following additional risks for perioperative complications:  The 10-year ASCVD risk score (Shebaisi GUTIERREZ Jr., et al., 2013) is: 5.7%    Values used to calculate the score:      Age: 64 years      Sex: Female      Is Non- : No      Diabetic: No      Tobacco smoker: No      Systolic Blood Pressure: 120 mmHg      Is BP treated: No      HDL Cholesterol: 70 mg/dL      Total Cholesterol: 258 mg/dL      ICD-10-CM    1. Biliary dyskinesia K82.8    2. Preop general physical exam Z01.818 CBC with platelets and differential   3. Need for Tdap vaccination Z23 TDAP, IM (10 - 64 YRS) - Adacel     VACCINE ADMINISTRATION, INITIAL       RECOMMENDATIONS:         --Patient is on no chronic medications    APPROVAL GIVEN to proceed with proposed procedure, without further diagnostic evaluation       Signed Electronically by: Gopi Dee PA-C    Copy of this evaluation report is provided to requesting physician.    Ludmila Preop Guidelines    Revised Cardiac Risk Index

## 2019-10-02 ENCOUNTER — TRANSFERRED RECORDS (OUTPATIENT)
Dept: HEALTH INFORMATION MANAGEMENT | Facility: CLINIC | Age: 64
End: 2019-10-02

## 2019-10-15 ENCOUNTER — OFFICE VISIT (OUTPATIENT)
Dept: SURGERY | Facility: CLINIC | Age: 64
End: 2019-10-15
Payer: COMMERCIAL

## 2019-10-15 VITALS
DIASTOLIC BLOOD PRESSURE: 83 MMHG | SYSTOLIC BLOOD PRESSURE: 121 MMHG | HEART RATE: 77 BPM | WEIGHT: 169 LBS | TEMPERATURE: 98.6 F | BODY MASS INDEX: 27.28 KG/M2

## 2019-10-15 DIAGNOSIS — Z90.49 S/P LAPAROSCOPIC CHOLECYSTECTOMY: Primary | ICD-10-CM

## 2019-10-15 PROCEDURE — 99024 POSTOP FOLLOW-UP VISIT: CPT | Performed by: SURGERY

## 2019-10-15 NOTE — LETTER
10/15/2019         RE: Jena Ibarra  [address] [place] [address]        Dear Colleague,    Thank you for referring your patient, Jena Ibarra, to the [facility]. Please see a copy of my visit note below.    Jena is a 64 year old female who is status post laparoscopic cholecystectomy  with  Maybe some chills and no fever.   Since the gallbladder is out she does not having the left upper quadrant pains any more.    Patient's  Pain is  improving.  Has had some some muscle spasms lateral to the  epigastric incision. Appetite is  improving.    Wound(s)  Is/are   clean dry and intact with no evidence of infection.    Questions were answered and discussion of no lifting more than 20 pounds for  3 weeks after surgery.     Discussed post op fatty food problems without a gallbladder such as diarrhea and flatulence if eats too much fatty food at one time.     Path report shows:    Final Diagnosis   Gallbladder, cholecystectomy - Chronic cholecystitis with cholelithiasis.   Electronically signed by Brenda Del Castillo MD on 10/7/2019 at 1016   Clinical Information    right upper quadrant pain, abnormal HIDA scan   Gross Description    Gallbladder: Is a 9.8 x 2.8 x 2.8 cm intact gallbladder with a smooth tan-green serosa. The lumen contains a single gallstone lodged within the gallbladder neck measuring 2 cm. The mucosa is tan-green and velvety with focal yellow stippling. No masses are identified. The average wall thickness is 0.2 cm. Representative sections are submitted in cassette A1.  (CM)         Plan: follow up as needed.  Use antibiotic ointment on wound at night.     Time spent with the patient with greater that 50% of the time in discussion was 16 minutes.  Jamal Beach MD           POST-OPERATIVE NOTE       Surgeon(s):  Jamal Beach MD     Assistant(s):  Circulating nurse: Payal So, RN  Scrub tech: Markus Negron; Cherelle Magallon     Preoperative  Diagnosis:  RIGHT UPPER QUADRANT PAIN ABNORMAL HIDA SCAN     Postoperative Diagnosis:  Same with scarring of omentum to the gallbladder consistant with  Gallbladder attacks.  And beginning of a  right inguinal hernia did not see the left side due to bowel.      Procedure(s):  Laprascopic cholecystectomy       Complications:  None      EBL: 3 mL     Anesthesia:  General     Operative Findings:  As above     Specimen(s):    ID Type Source Tests Collected by Time Destination   1 :  Tissue Gallbladder SURGICAL PATHOLOGY Jamal Beach MD 10/2/2019 1304           Grafts and/or Implants:None     Condition on Discharge from Operating Room: Satisfactory        REPORT OF OPERATION/ PROCEDURE  Please see below operative note.     [facility] -     Date of Service: 10/2/2019     Surgeon:  Jamal Beach MD     PREOPERATIVE DIAGNOSIS:   Cholecystitis with gallstones and right upper quadrant pain.     POSTOPERATIVE DIAGNOSIS:   Same with scarring of omentum to the gallbladder consistant with  Gallbladder attacks.  And beginning of a  right inguinal hernia did not see the left side due to bowel.       PROCEDURE PERFORMED:  Laparoscopic cholecystectomy      ANESTHESIA:  General anesthetic with 0.5%Marcaine with epinephrine injected to sites prior to and at the end of the procedure.     ESTIMATED BLOOD LOSS:  Less than 4 mL.     INDICATIONS:  The patient is a 64 y.o. female  who has been having a lot of trouble with   right upper quadrant pain with eating fatty foods,   And gallstone seen on ultrasound, .  Discussed laparoscopic cholecystectomy possibly open.   We discussed the risks and complications including bleeding, infection, damage to bowel, hernias, damage to the bile ducts, possible conversion to open procedure, especially if we get into  bleeding that we cannot control laparoscopically, if we get a hole in the bowel or if the anatomy of the bile ducts does not look correct.  She understands and would  like to proceed.  We also discussed what to expect afterwards. .     DESCRIPTION OF PROCEDURE:  The patient was brought to the OR and placed in the supine position.  After receiving general anesthesia, the abdomen was prepped and draped in a sterile manner.  After injecting the local a 5 mm trocar was placed at the left upper quadrant  with a camera going through it and under direct visualization we entered the abdomen without  difficulty.  The abdomen was insufflated to 15 mmHg.  Then an infraumbilical site with a 5 mm torcar.  Then a 10 -12 mm trocar was placed in the epigastric area and 2, 5 mm trocars placed in the right upper quadrant in such a way to facilitate the procedure.  just a little bit to the right of the umbilicus where we could see things well.     The gallbladder was grasped and the cystic duct was carefully dissected out, clipped x2 proximally and distally and divided.  The cystic artery was  carefully dissected out, clipped x2 proximally and distally and divided.  Then blunt and sharp cautery dissection was used to dissect the gallbladder from the gallbladder fossa.  All clips were placed on the duct or cystic artery after proving they were going into the gallbladder.  After blunt and sharp cautery dissection was used to dissect the gallbladder from the gallbladder fossa and just before it was removed, we did look again to make  sure there was no bleeding and it looked very dry.  The gallbladder was then removed, placed in an Endo catch and removed  with having to dilate the tract and skin just mildly to get it out of the 10-12 trocar site.    After that was done, we looked at all the  sites and there was no bleeding.  What we could see of the liver looked normal.  The bowel looked normal.  The 10-12 trocar fascia was closed with 0 Vicryl suture x1 using the Philip-Karina equipment.  The other sites were looked at to make sure there was no bleeding and none was seen.  The trocars were removed  after removing as much CO2 as possible.  The patient did receive antibiotics preoperatively.  We did close all of the wound sites of  the skin with 4-0 Monocryl covered with tincture of benzoin, Steri-Strips and Tegaderm.  The 10-12 trocar site had a stitch of 3-0 Vicryl suture to help bring the deeper fatty layers together to decrease dead space.  An abdominal binder was placed.    Patient did receive antibiotics preoperatively      The plan is to discharge her home with no lifting more than 20 pounds for three weeks and will see her back in approximately two weeks.           Jamal Beach MD                 Again, thank you for allowing me to participate in the care of your patient.        Sincerely,        Jamal Beach MD

## 2019-10-15 NOTE — PATIENT INSTRUCTIONS
Jena is a 64 year old female who is status post laparoscopic cholecystectomy  with  Maybe some chills and no fever.   Since the gallbladder is out she does not having the left upper quadrant pains any more.    Patient's  Pain is  improving.  Has had some some muscle spasms lateral to the  epigastric incision. Appetite is  improving.    Wound(s)  Is/are   clean dry and intact with no evidence of infection.    Questions were answered and discussion of no lifting more than 20 pounds for  3 weeks after surgery.     Discussed post op fatty food problems without a gallbladder such as diarrhea and flatulence if eats too much fatty food at one time.     Path report shows:    Final Diagnosis   Gallbladder, cholecystectomy - Chronic cholecystitis with cholelithiasis.   Electronically signed by Brenda Del Castillo MD on 10/7/2019 at 1016   Clinical Information    right upper quadrant pain, abnormal HIDA scan   Gross Description    Gallbladder: Is a 9.8 x 2.8 x 2.8 cm intact gallbladder with a smooth tan-green serosa. The lumen contains a single gallstone lodged within the gallbladder neck measuring 2 cm. The mucosa is tan-green and velvety with focal yellow stippling. No masses are identified. The average wall thickness is 0.2 cm. Representative sections are submitted in cassette A1.  (CM)         Plan: follow up as needed.  Use antibiotic ointment on wound at night.

## 2019-10-15 NOTE — PROGRESS NOTES
Jena is a 64 year old female who is status post laparoscopic cholecystectomy  with  Maybe some chills and no fever.   Since the gallbladder is out she does not having the left upper quadrant pains any more.    Patient's  Pain is  improving.  Has had some some muscle spasms lateral to the  epigastric incision. Appetite is  improving.    Wound(s)  Is/are   clean dry and intact with no evidence of infection.    Questions were answered and discussion of no lifting more than 20 pounds for  3 weeks after surgery.     Discussed post op fatty food problems without a gallbladder such as diarrhea and flatulence if eats too much fatty food at one time.     Path report shows:    Final Diagnosis   Gallbladder, cholecystectomy - Chronic cholecystitis with cholelithiasis.   Electronically signed by Brenda Del Castillo MD on 10/7/2019 at 1016   Clinical Information    right upper quadrant pain, abnormal HIDA scan   Gross Description    Gallbladder: Is a 9.8 x 2.8 x 2.8 cm intact gallbladder with a smooth tan-green serosa. The lumen contains a single gallstone lodged within the gallbladder neck measuring 2 cm. The mucosa is tan-green and velvety with focal yellow stippling. No masses are identified. The average wall thickness is 0.2 cm. Representative sections are submitted in cassette A1.  (CM)         Plan: follow up as needed.  Use antibiotic ointment on wound at night.     Time spent with the patient with greater that 50% of the time in discussion was 16 minutes.  Jamal Beach MD           POST-OPERATIVE NOTE       Surgeon(s):  Jamal Beach MD     Assistant(s):  Circulating nurse: Payal So RN  Scrub tech: Vidya Negron Felicity K     Preoperative Diagnosis:  RIGHT UPPER QUADRANT PAIN ABNORMAL HIDA SCAN     Postoperative Diagnosis:  Same with scarring of omentum to the gallbladder consistant with  Gallbladder attacks.  And beginning of a  right inguinal hernia did not see the left side due  to bowel.      Procedure(s):  Laprascopic cholecystectomy       Complications:  None      EBL: 3 mL     Anesthesia:  General     Operative Findings:  As above     Specimen(s):    ID Type Source Tests Collected by Time Destination   1 :  Tissue Gallbladder SURGICAL PATHOLOGY Jamal Beach MD 10/2/2019 1304           Grafts and/or Implants:None     Condition on Discharge from Operating Room: Satisfactory        REPORT OF OPERATION/ PROCEDURE  Please see below operative note.     [facility] -     Date of Service: 10/2/2019     Surgeon:  Jamal Beach MD     PREOPERATIVE DIAGNOSIS:   Cholecystitis with gallstones and right upper quadrant pain.     POSTOPERATIVE DIAGNOSIS:   Same with scarring of omentum to the gallbladder consistant with  Gallbladder attacks.  And beginning of a  right inguinal hernia did not see the left side due to bowel.       PROCEDURE PERFORMED:  Laparoscopic cholecystectomy      ANESTHESIA:  General anesthetic with 0.5%Marcaine with epinephrine injected to sites prior to and at the end of the procedure.     ESTIMATED BLOOD LOSS:  Less than 4 mL.     INDICATIONS:  The patient is a 64 y.o. female  who has been having a lot of trouble with   right upper quadrant pain with eating fatty foods,   And gallstone seen on ultrasound, .  Discussed laparoscopic cholecystectomy possibly open.   We discussed the risks and complications including bleeding, infection, damage to bowel, hernias, damage to the bile ducts, possible conversion to open procedure, especially if we get into  bleeding that we cannot control laparoscopically, if we get a hole in the bowel or if the anatomy of the bile ducts does not look correct.  She understands and would like to proceed.  We also discussed what to expect afterwards. .     DESCRIPTION OF PROCEDURE:  The patient was brought to the OR and placed in the supine position.  After receiving general anesthesia, the abdomen was prepped and draped in a  sterile manner.  After injecting the local a 5 mm trocar was placed at the left upper quadrant  with a camera going through it and under direct visualization we entered the abdomen without  difficulty.  The abdomen was insufflated to 15 mmHg.  Then an infraumbilical site with a 5 mm torcar.  Then a 10 -12 mm trocar was placed in the epigastric area and 2, 5 mm trocars placed in the right upper quadrant in such a way to facilitate the procedure.  just a little bit to the right of the umbilicus where we could see things well.     The gallbladder was grasped and the cystic duct was carefully dissected out, clipped x2 proximally and distally and divided.  The cystic artery was  carefully dissected out, clipped x2 proximally and distally and divided.  Then blunt and sharp cautery dissection was used to dissect the gallbladder from the gallbladder fossa.  All clips were placed on the duct or cystic artery after proving they were going into the gallbladder.  After blunt and sharp cautery dissection was used to dissect the gallbladder from the gallbladder fossa and just before it was removed, we did look again to make  sure there was no bleeding and it looked very dry.  The gallbladder was then removed, placed in an Endo catch and removed  with having to dilate the tract and skin just mildly to get it out of the 10-12 trocar site.    After that was done, we looked at all the  sites and there was no bleeding.  What we could see of the liver looked normal.  The bowel looked normal.  The 10-12 trocar fascia was closed with 0 Vicryl suture x1 using the [employer] equipment.  The other sites were looked at to make sure there was no bleeding and none was seen.  The trocars were removed after removing as much CO2 as possible.  The patient did receive antibiotics preoperatively.  We did close all of the wound sites of  the skin with 4-0 Monocryl covered with tincture of benzoin, Steri-Strips and Tegaderm.  The 10-12 trocar site  had a stitch of 3-0 Vicryl suture to help bring the deeper fatty layers together to decrease dead space.  An abdominal binder was placed.    Patient did receive antibiotics preoperatively      The plan is to discharge her home with no lifting more than 20 pounds for three weeks and will see her back in approximately two weeks.           Jamal Beach MD

## 2019-12-17 ASSESSMENT — ENCOUNTER SYMPTOMS
DYSURIA: 0
PALPITATIONS: 0
HEMATOCHEZIA: 0
NERVOUS/ANXIOUS: 0
DIARRHEA: 0
FREQUENCY: 0
EYE PAIN: 0
ARTHRALGIAS: 0
SHORTNESS OF BREATH: 0
PARESTHESIAS: 0
COUGH: 1
HEARTBURN: 0
CHILLS: 0
BREAST MASS: 0
MYALGIAS: 0
JOINT SWELLING: 0
WEAKNESS: 0
FEVER: 0
SORE THROAT: 0
DIZZINESS: 0
HEMATURIA: 0
CONSTIPATION: 0
NAUSEA: 0
HEADACHES: 0
ABDOMINAL PAIN: 0

## 2019-12-17 ASSESSMENT — ACTIVITIES OF DAILY LIVING (ADL): CURRENT_FUNCTION: NO ASSISTANCE NEEDED

## 2019-12-18 NOTE — PROGRESS NOTES
"SUBJECTIVE:   Jena Ibarra is a 64 year old female who presents for Preventive Visit.    Are you in the first 12 months of your Medicare coverage?  No    Healthy Habits:     In general, how would you rate your overall health?  Good    Frequency of exercise:  None    Do you usually eat at least 4 servings of fruit and vegetables a day, include whole grains    & fiber and avoid regularly eating high fat or \"junk\" foods?  Yes    Taking medications regularly:  Not Applicable    Medication side effects:  Not applicable    Ability to successfully perform activities of daily living:  No assistance needed    Home Safety:  No safety concerns identified    Hearing Impairment:  Feel that people are mumbling or not speaking clearly and need to ask people to speak up or repeat themselves    In the past 6 months, have you been bothered by leaking of urine?  No    In general, how would you rate your overall mental or emotional health?  Excellent      PHQ-2 Total Score: 0    Do you feel safe in your environment? Yes    Have you ever done Advance Care Planning? (For example, a Health Directive, POLST, or a discussion with a medical provider or your loved ones about your wishes): Yes, patient states has an Advance Care Planning document and will bring a copy to the clinic.      Right Ear:      1000 Hz RESPONSE- on Level: 40 db (Conditioning sound)   1000 Hz: RESPONSE- on Level:   20 db    2000 Hz: RESPONSE- on Level: 25 db   4000 Hz: RESPONSE- on Level: 60 db    Left Ear:      4000 Hz: RESPONSE- on Level: 70 db   2000 Hz: RESPONSE- on Level: 60 db   1000 Hz: RESPONSE- on Level: 25 db    500 Hz: RESPONSE- on Level: 25 db    Right Ear:    500 Hz: RESPONSE- on Level: 25 db    Hearing Acuity: REFER    Hearing Assessment: abnormal--refer to audiology  Fall risk  Fallen 2 or more times in the past year?: No  Any fall with injury in the past year?: No    Cognitive Screening   1) Repeat 3 items (Leader, Season, Table)    2) Clock draw: " NORMAL  3) 3 item recall: Recalls 2 objects   Results: NORMAL clock, 1-2 items recalled: COGNITIVE IMPAIRMENT LESS LIKELY    Mini-CogTM Copyright TRA Singh. Licensed by the author for use in [facility]; reprinted with permission ([email or web address]@.[email or web address]). All rights reserved.      Do you have sleep apnea, excessive snoring or daytime drowsiness?: yes    Reviewed and updated as needed this visit by clinical staff  Tobacco  Allergies  Meds  Problems  Med Hx  Surg Hx  Fam Hx  Soc Hx          Reviewed and updated as needed this visit by Provider  Tobacco  Allergies  Meds  Problems  Med Hx  Surg Hx  Fam Hx        Social History     Tobacco Use     Smoking status: Never Smoker     Smokeless tobacco: Never Used   Substance Use Topics     Alcohol use: Yes     Comment: occasional       Alcohol Use 12/17/2019   Prescreen: >3 drinks/day or >7 drinks/week? No   Prescreen: >3 drinks/day or >7 drinks/week? -     Patient/Parent of patient informed that anything we discuss that is not related to preventative medicine, may be billed for; patient verbalizes understanding.    No concerns brought up to Rooming staff. Manju Tam MA         Current providers sharing in care for this patient include:   Patient Care Team:  Clinic, [facility] as PCP - General (Clinic)  Gopi Dee PA-C as Assigned PCP    The following health maintenance items are reviewed in Epic and correct as of today:  Health Maintenance   Topic Date Due     ZOSTER IMMUNIZATION (1 of 2) 03/31/2003     FALL RISK ASSESSMENT  03/31/2018     PNEUMOCOCCAL IMMUNIZATION 65+ LOW/MEDIUM RISK (1 of 2 - PCV13) 03/31/2018     MEDICARE ANNUAL WELLNESS VISIT  10/13/2018     MAMMO SCREENING  07/06/2020     LIPID  10/27/2022     COLONOSCOPY  11/14/2022     ADVANCE CARE PLANNING  12/23/2024     DTAP/TDAP/TD IMMUNIZATION (3 - Td) 09/27/2029     DEXA  Completed     HEPATITIS C SCREENING  Completed     PHQ-2  Completed     INFLUENZA VACCINE  Completed      IPV IMMUNIZATION  Aged Out     MENINGITIS IMMUNIZATION  Aged Out     Lab work is in process  Labs reviewed in EPIC  BP Readings from Last 3 Encounters:   12/23/19 129/83   10/15/19 121/83   09/27/19 120/81    Wt Readings from Last 3 Encounters:   12/23/19 77.2 kg (170 lb 3.2 oz)   10/15/19 76.7 kg (169 lb)   09/27/19 76.7 kg (169 lb)                  Patient Active Problem List   Diagnosis     CARDIOVASCULAR SCREENING; LDL GOAL LESS THAN 160     Radial styloid tenosynovitis     CTS (carpal tunnel syndrome)     Atrophic vaginitis     Gallstones     Abnormal biliary HIDA scan     S/P laparoscopic cholecystectomy     Past Surgical History:   Procedure Laterality Date     BIOPSY       CHOLECYSTECTOMY       COLONOSCOPY       COLONOSCOPY WITH CO2 INSUFFLATION N/A 10/13/2014    Procedure: COLONOSCOPY WITH CO2 INSUFFLATION;  Surgeon: Jamal Beach MD;  Location: [facility] OR     COLONOSCOPY WITH CO2 INSUFFLATION N/A 11/14/2017    Procedure: COLONOSCOPY WITH CO2 INSUFFLATION;  COLON-RECTAL BLEEDING / BARUSYA;  Surgeon: Henok Jewell MD;  Location: [facility] OR     COMBINED ESOPHAGOSCOPY, GASTROSCOPY, DUODENOSCOPY (EGD) WITH CO2 INSUFFLATION N/A 7/25/2019    Procedure: ESOPHAGOGASTRODUODENOSCOPY, WITH CO2 INSUFFLATION;  Surgeon: Jamal Beach MD;  Location: [facility] OR     ESOPHAGOSCOPY, GASTROSCOPY, DUODENOSCOPY (EGD), COMBINED N/A 7/25/2019    Procedure: Esophagogastroduodenoscopy, With Biopsy;  Surgeon: Jamal Becah MD;  Location: [facility] OR     ESOPHAGOSCOPY, GASTROSCOPY, DUODENOSCOPY (EGD), COMBINED N/A 9/18/2019    Procedure: ESOPHAGOGASTRODUODENOSCOPY, WITH ENDOSCOPIC US;  Surgeon: Ric Jaeger MD;  Location: [facility] OR     EYE SURGERY       TUBAL LIGATION         Social History     Tobacco Use     Smoking status: Never Smoker     Smokeless tobacco: Never Used   Substance Use Topics     Alcohol use: Yes     Comment: occasional     Family History   Problem Relation Age of Onset     Cancer Mother  "        kidney     Diabetes Mother      Heart Disease Mother      Lipids Mother      Hypertension Mother      Colon Polyps Mother          benign     Hyperlipidemia Mother      Other Cancer Mother         Kidney     Osteoporosis Mother      Obesity Mother      Arthritis Father      Lipids Father      Hypertension Father      Osteoporosis Father      Hyperlipidemia Father      Prostate Cancer Father      Anxiety Disorder Father      Mental Illness Father      Arthritis Sister      Neurologic Disorder Sister         seizures     Obesity Sister      Cancer Brother         pancreatic     Coronary Artery Disease Brother      Obesity Brother      Other Cancer Brother         Pancreas     Thyroid Disease Son      Obesity Sister      Breast Cancer No family hx of          Current Outpatient Medications   Medication Sig Dispense Refill     azithromycin (ZITHROMAX) 250 MG tablet Take 2 tablets (500 mg) by mouth daily for 1 day, THEN 1 tablet (250 mg) daily for 4 days. 6 tablet 0     No Known Allergies  Recent Labs   Lab Test 07/16/19  1015 04/09/19  1635 10/27/17  0826 10/13/17  1554 09/04/14  1349   LDL  --   --  151*  --   --    HDL  --   --  70  --   --    TRIG  --   --  185*  --   --    ALT 39 27  --   --  25   CR  --   --   --  0.83 0.93   GFRESTIMATED  --   --   --  69 61   GFRESTBLACK  --   --   --  84 74   POTASSIUM  --   --   --  4.0 4.1   TSH  --   --   --  1.95 1.86          Review of Systems  Constitutional, HEENT, cardiovascular, pulmonary, GI, , musculoskeletal, neuro, skin, endocrine and psych systems are negative, except as otherwise noted. POSITIVE for vaginal odor and vaginal discharge, hearing loss, cough for 2 weeks with productive yellow sputum.     OBJECTIVE:   /83   Pulse 92   Temp 98.2  F (36.8  C) (Oral)   Resp 16   Ht 1.662 m (5' 5.45\")   Wt 77.2 kg (170 lb 3.2 oz)   SpO2 100%   BMI 27.93 kg/m   Estimated body mass index is 27.93 kg/m  as calculated from the following:    Height as of " "this encounter: 1.662 m (5' 5.45\").    Weight as of this encounter: 77.2 kg (170 lb 3.2 oz).     Physical Exam  GENERAL: healthy, alert and no distress  EYES: Eyes grossly normal to inspection, PERRL and conjunctivae and sclerae normal  HENT: ear canals and TM's normal, nose and mouth without ulcers or lesions  NECK: no adenopathy, no asymmetry, masses, or scars and thyroid normal to palpation  RESP: POSITIVE exp wheeze bilat LL improved with cough  BREAST: deferred per pt, no concerns  CV: regular rate and rhythm, normal S1 S2, no S3 or S4, no murmur, click or rub, no peripheral edema and peripheral pulses strong  ABDOMEN: soft, nontender, no hepatosplenomegaly, no masses and bowel sounds normal   (female): deferred- self swab collected by patient  MS: no gross musculoskeletal defects noted, no edema, No CVA tenderness  SKIN: no suspicious lesions or rashes  NEURO: Normal strength and tone, cranial nerves intact, mentation intact and speech normal  PSYCH: mentation appears normal, affect normal/bright  LYMPH: no cervical, supraclavicular, axillary adenopathy    Diagnostic Test Results:  Labs reviewed in Epic  See orders    ASSESSMENT / PLAN:       ICD-10-CM    1. Encounter for Medicare annual wellness exam Z00.00    2. Advance care planning Z71.89    3. Vaginal discharge N89.8 Wet prep   4. Vaginal odor N89.8 Wet prep   5. Screening for diabetes mellitus Z13.1 Glucose   6. Screening for thyroid disorder Z13.29 TSH with free T4 reflex   7. Lipid screening Z13.220 Lipid panel reflex to direct LDL Fasting   8. Perceived hearing changes H91.90 AUDIOLOGY ADULT REFERRAL   9. Acute bronchitis with symptoms > 10 days J20.9 azithromycin (ZITHROMAX) 250 MG tablet       COUNSELING:  Reviewed preventive health counseling, as reflected in patient instructions    Estimated body mass index is 27.93 kg/m  as calculated from the following:    Height as of this encounter: 1.662 m (5' 5.45\").    Weight as of this encounter: 77.2 kg " (170 lb 3.2 oz).    Weight management plan: Discussed healthy diet and exercise guidelines     reports that she has never smoked. She has never used smokeless tobacco.      Appropriate preventive services were discussed with this patient, including applicable screening as appropriate for cardiovascular disease, diabetes, osteopenia/osteoporosis, and glaucoma.  As appropriate for age/gender, discussed screening for colorectal cancer, prostate cancer, breast cancer, and cervical cancer. Checklist reviewing preventive services available has been given to the patient.    Reviewed patients plan of care and provided an AVS. The Basic Care Plan (routine screening as documented in Health Maintenance) for Jena meets the Care Plan requirement. This Care Plan has been established and reviewed with the Patient.    Counseling Resources:  ATP IV Guidelines  Pooled Cohorts Equation Calculator  Breast Cancer Risk Calculator  FRAX Risk Assessment  ICSI Preventive Guidelines  Dietary Guidelines for Americans, 2010  USDA's MyPlate  ASA Prophylaxis  Lung CA Screening    AMIE Nicolas  [facility] TIA    Identified Health Risks:

## 2019-12-18 NOTE — PROGRESS NOTES
"  SUBJECTIVE:   Jena Ibarra is a 64 year old female who presents for Preventive Visit.  {PVP to remind patient that this is not necessarily a physical exam; physical exam may or may not be done:635146::\"click delete button to remove this line now\"}  {PVP to inform patient that additional E&M charge may apply, if additional problems addressed:210112::\"click delete button to remove this line now\"}  Are you in the first 12 months of your Medicare Part B coverage?  { :470217::\"No\"}    Physical Health:    In general, how would you rate your overall physical health? { :365021}    Outside of work, how many days during the week do you exercise? { :041288}    Outside of work, approximately how many minutes a day do you exercise?{ :631572}    If you drink alcohol do you typically have >3 drinks per day or >7 drinks per week? { :137159}    Do you usually eat at least 4 servings of fruit and vegetables a day, include whole grains & fiber and avoid regularly eating high fat or \"junk\" foods? { :502002::\"Yes\"}    Do you have any problems taking medications regularly?  { :313439::\"No\"}    Do you have any side effects from medications? { :509496}    Needs assistance for the following daily activities: { :258169}    Which of the following safety concerns are present in your home?  { :265312::\"none identified\"}     Hearing impairment: { :551135}    In the past 6 months, have you been bothered by leaking of urine? { :861853}    Mental Health:    In general, how would you rate your overall mental or emotional health? { :049137}  PHQ-2 Score: (P) 0    Do you feel safe in your environment? { :443912}    Have you ever done Advance Care Planning? (For example, a Health Directive, POLST, or a discussion with a medical provider or your loved ones about your wishes): { :899872}    Additional concerns to address?  {If YES, notify patient they may be responsible for a copay, coinsurance or deductible if the visit today includes services such " "as checking on a sore throat, having an x-ray or lab test, or treating and evaluating a new or existing condition :793640::\"No\"}    Fall risk:  { :600003}  {If any of the above assessments are answered yes, consider ordering appropriate referrals (Optional):105122::\"click delete button to remove this line now\"}  Cognitive Screening: { :582637}    {Do you have sleep apnea, excessive snoring or daytime drowsiness? (Optional):822721}    {Outside tests to abstract? :924104}    {additional problems to add (Optional):125245}    Reviewed and updated as needed this visit by clinical staff         Reviewed and updated as needed this visit by Provider        Social History     Tobacco Use     Smoking status: Never Smoker     Smokeless tobacco: Never Used   Substance Use Topics     Alcohol use: Yes     Comment: occasional                           Current providers sharing in care for this patient include: {Rooming staff:  Please update Care Team in Rooming Activity, refresh this note and then delete this statement}  Patient Care Team:  Ludmila Torres as PCP - General (Clinic)  Gopi Dee PA-C as Assigned PCP    The following health maintenance items are reviewed in Epic and correct as of today:  Health Maintenance   Topic Date Due     ZOSTER IMMUNIZATION (1 of 2) 03/31/2003     PNEUMOCOCCAL IMMUNIZATION 65+ LOW/MEDIUM RISK (1 of 2 - PCV13) 03/31/2018     ADVANCE CARE PLANNING  04/17/2018     MEDICARE ANNUAL WELLNESS VISIT  10/13/2018     MAMMO SCREENING  07/06/2020     FALL RISK ASSESSMENT  09/27/2020     LIPID  10/27/2022     COLONOSCOPY  11/14/2022     DTAP/TDAP/TD IMMUNIZATION (3 - Td) 09/27/2029     DEXA  Completed     HEPATITIS C SCREENING  Completed     PHQ-2  Completed     INFLUENZA VACCINE  Completed     IPV IMMUNIZATION  Aged Out     MENINGITIS IMMUNIZATION  Aged Out     {Chronicprobdata (Optional):149343}  {Decision Support (Optional):980357}    ROS:  {ROS COMP:989941}    OBJECTIVE:   There were no " "vitals taken for this visit. Estimated body mass index is 27.28 kg/m  as calculated from the following:    Height as of 19: 1.676 m (5' 6\").    Weight as of 10/15/19: 76.7 kg (169 lb).  EXAM:   {Exam :472445}    {Diagnostic Test Results (Optional):394018::\"Diagnostic Test Results:\",\"Labs reviewed in Epic\"}    ASSESSMENT / PLAN:   {Dia Picklist:977946}    COUNSELING:  {Medicare Counselin}    Estimated body mass index is 27.28 kg/m  as calculated from the following:    Height as of 19: 1.676 m (5' 6\").    Weight as of 10/15/19: 76.7 kg (169 lb).    {Weight Management Plan (ACO) Complete if BMI is abnormal-  Ages 18-64  BMI >24.9.  Age 65+ with BMI <23 or >30 (Optional):411755}     reports that she has never smoked. She has never used smokeless tobacco.  {Tobacco Cessation -- Complete if patient is a smoker (Optional):128481}    Appropriate preventive services were discussed with this patient, including applicable screening as appropriate for cardiovascular disease, diabetes, osteopenia/osteoporosis, and glaucoma.  As appropriate for age/gender, discussed screening for colorectal cancer, prostate cancer, breast cancer, and cervical cancer. Checklist reviewing preventive services available has been given to the patient.    Reviewed patients plan of care and provided an AVS. The {CarePlan:180140} for Jena meets the Care Plan requirement. This Care Plan has been established and reviewed with the {PATIENT, FAMILY MEMBER, CAREGIVER:143857}.    Counseling Resources:  ATP IV Guidelines  Pooled Cohorts Equation Calculator  Breast Cancer Risk Calculator  FRAX Risk Assessment  ICSI Preventive Guidelines  Dietary Guidelines for Americans, 2010  USDA's MyPlate  ASA Prophylaxis  Lung CA Screening    Leidy Andrea NP  [facility] TIA  "

## 2019-12-18 NOTE — PATIENT INSTRUCTIONS
Reminders:     Please remember to arrive 5-10 minutes early for your appointments. If you are late you may need to reschedule your appointment.    If you have mychart please be aware your results and communications will be sent within your mychart. Unless results are critical and/or urgent value.       Patient Education   Personalized Prevention Plan  You are due for the preventive services outlined below.  Your care team is available to assist you in scheduling these services.  If you have already completed any of these items, please share that information with your care team to update in your medical record.  Health Maintenance Due   Topic Date Due     Zoster (Shingles) Vaccine (1 of 2) 03/31/2003     Pneumococcal Vaccine (1 of 2 - PCV13) 03/31/2018     Discuss Advance Care Planning  04/17/2018     Annual Wellness Visit  10/13/2018

## 2019-12-20 ENCOUNTER — ANCILLARY PROCEDURE (OUTPATIENT)
Dept: MRI IMAGING | Facility: CLINIC | Age: 64
End: 2019-12-20
Attending: INTERNAL MEDICINE
Payer: COMMERCIAL

## 2019-12-20 DIAGNOSIS — K86.89 ATROPHIC PANCREAS: ICD-10-CM

## 2019-12-20 PROCEDURE — 74183 MRI ABD W/O CNTR FLWD CNTR: CPT | Performed by: RADIOLOGY

## 2019-12-20 PROCEDURE — A9585 GADOBUTROL INJECTION: HCPCS | Performed by: INTERNAL MEDICINE

## 2019-12-20 RX ORDER — GADOBUTROL 604.72 MG/ML
10 INJECTION INTRAVENOUS ONCE
Status: COMPLETED | OUTPATIENT
Start: 2019-12-20 | End: 2019-12-20

## 2019-12-20 RX ADMIN — GADOBUTROL 8 ML: 604.72 INJECTION INTRAVENOUS at 09:00

## 2019-12-23 ENCOUNTER — OFFICE VISIT (OUTPATIENT)
Dept: FAMILY MEDICINE | Facility: CLINIC | Age: 64
End: 2019-12-23
Payer: COMMERCIAL

## 2019-12-23 VITALS
TEMPERATURE: 98.2 F | OXYGEN SATURATION: 100 % | SYSTOLIC BLOOD PRESSURE: 129 MMHG | DIASTOLIC BLOOD PRESSURE: 83 MMHG | HEIGHT: 65 IN | BODY MASS INDEX: 28.36 KG/M2 | RESPIRATION RATE: 16 BRPM | HEART RATE: 92 BPM | WEIGHT: 170.2 LBS

## 2019-12-23 DIAGNOSIS — N89.8 VAGINAL DISCHARGE: ICD-10-CM

## 2019-12-23 DIAGNOSIS — Z13.29 SCREENING FOR THYROID DISORDER: ICD-10-CM

## 2019-12-23 DIAGNOSIS — N89.8 VAGINAL ODOR: ICD-10-CM

## 2019-12-23 DIAGNOSIS — H91.90 PERCEIVED HEARING CHANGES: ICD-10-CM

## 2019-12-23 DIAGNOSIS — J20.9 ACUTE BRONCHITIS WITH SYMPTOMS > 10 DAYS: ICD-10-CM

## 2019-12-23 DIAGNOSIS — Z71.89 ADVANCE CARE PLANNING: ICD-10-CM

## 2019-12-23 DIAGNOSIS — Z00.00 ENCOUNTER FOR MEDICARE ANNUAL WELLNESS EXAM: Primary | ICD-10-CM

## 2019-12-23 DIAGNOSIS — Z13.220 LIPID SCREENING: ICD-10-CM

## 2019-12-23 DIAGNOSIS — Z13.1 SCREENING FOR DIABETES MELLITUS: ICD-10-CM

## 2019-12-23 LAB
CHOLEST SERPL-MCNC: 230 MG/DL
GLUCOSE SERPL-MCNC: 94 MG/DL (ref 70–99)
HDLC SERPL-MCNC: 60 MG/DL
LDLC SERPL CALC-MCNC: 140 MG/DL
NONHDLC SERPL-MCNC: 170 MG/DL
SPECIMEN SOURCE: NORMAL
TRIGL SERPL-MCNC: 150 MG/DL
TSH SERPL DL<=0.005 MIU/L-ACNC: 1.6 MU/L (ref 0.4–4)
WET PREP SPEC: NORMAL

## 2019-12-23 PROCEDURE — 87210 SMEAR WET MOUNT SALINE/INK: CPT | Performed by: NURSE PRACTITIONER

## 2019-12-23 PROCEDURE — 84443 ASSAY THYROID STIM HORMONE: CPT | Performed by: NURSE PRACTITIONER

## 2019-12-23 PROCEDURE — 99213 OFFICE O/P EST LOW 20 MIN: CPT | Mod: 25 | Performed by: NURSE PRACTITIONER

## 2019-12-23 PROCEDURE — 99397 PER PM REEVAL EST PAT 65+ YR: CPT | Performed by: NURSE PRACTITIONER

## 2019-12-23 PROCEDURE — 82947 ASSAY GLUCOSE BLOOD QUANT: CPT | Performed by: NURSE PRACTITIONER

## 2019-12-23 PROCEDURE — 36415 COLL VENOUS BLD VENIPUNCTURE: CPT | Performed by: NURSE PRACTITIONER

## 2019-12-23 PROCEDURE — 80061 LIPID PANEL: CPT | Performed by: NURSE PRACTITIONER

## 2019-12-23 RX ORDER — AZITHROMYCIN 250 MG/1
TABLET, FILM COATED ORAL
Qty: 6 TABLET | Refills: 0 | Status: SHIPPED | OUTPATIENT
Start: 2019-12-23 | End: 2019-12-28

## 2019-12-23 ASSESSMENT — MIFFLIN-ST. JEOR: SCORE: 1320.09

## 2019-12-23 NOTE — RESULT ENCOUNTER NOTE
Awais Bella,    Thank you for your recent office visit.    Here are your recent results.  Normal blood labs.     Feel free to contact me via [employer] or call the clinic at [phone].    Sincerely,    DEANDRE Palencia, FNP-BC

## 2020-01-07 ENCOUNTER — OFFICE VISIT (OUTPATIENT)
Dept: AUDIOLOGY | Facility: CLINIC | Age: 65
End: 2020-01-07
Attending: NURSE PRACTITIONER
Payer: COMMERCIAL

## 2020-01-07 DIAGNOSIS — H90.3 SNHL (SENSORY-NEURAL HEARING LOSS), ASYMMETRICAL: Primary | ICD-10-CM

## 2020-01-07 PROCEDURE — 92557 COMPREHENSIVE HEARING TEST: CPT | Performed by: AUDIOLOGIST

## 2020-01-07 PROCEDURE — 92567 TYMPANOMETRY: CPT | Performed by: AUDIOLOGIST

## 2020-01-07 NOTE — PROGRESS NOTES
AUDIOLOGY REPORT    SUBJECTIVE:  Jena Ibarra is a 64 year old female who was seen in the Audiology Clinic at the [facility] for audiologic evaluation, referred by Leidy Andrea N.P. The patient reports a gradual hearing loss bilaterally that she believes is worse in her left ear. The patient denies otalgia, aural fullness, otorrhea, tinnitus, and dizziness.  The patient notes difficulty with communication in a variety of listening situations.    OBJECTIVE:  Abuse Screening:  Do you feel unsafe at home or work/school? No  Do you feel threatened by someone? No  Does anyone try to keep you from having contact with others, or doing things outside of your home? No  Physical signs of abuse present? No     Fall Risk Screen:  1. Have you fallen two or more times in the past year? No  2. Have you fallen and had an injury in the past year? No    Timed Up and Go Score (in seconds): not tested  Is patient a fall risk? No  Referral initiated: No  Fall Risk Assessment Completed by Audiology    Otoscopic exam indicates ears are clear of cerumen bilaterally     Pure Tone Thresholds assessed using conventional audiometry with good  reliability from 250-8000 Hz bilaterally using insert earphones     RIGHT:  normal sloping to moderate sensorineural hearing loss    LEFT:    normal sloping to moderate-severe sensorineural hearing loss    Tympanogram:    RIGHT: normal eardrum mobility    LEFT:   Slightly restricted eardrum mobility     Reflexes (reported by stimulus ear):  Could not seal      Speech Reception Threshold:    RIGHT: 10 dB HL    LEFT:   15 dB HL  Word Recognition Score:     RIGHT: 100% at 50 dB HL using NU-6 recorded word list.    LEFT:   96% at 50 dB HL using NU-6 recorded word list.      ASSESSMENT:     ICD-10-CM    1. SNHL (sensory-neural hearing loss), asymmetrical H90.5 COMPREHENSIVE HEARING TEST     TYMPANOMETRY       Today s results were discussed with the patient in detail.      PLAN:  Patient was counseled regarding hearing loss and impact on communication. Handout on good communication strategies, and hearing aid use was given to patient. It is recommended that the patient be evaluated by ENT due to slight asymmetry. It is recommended that the patient's hearing status be monitored annually or sooner if new concerns arise  Please call this clinic with questions regarding these results or recommendations.        Kevin Stewart.  Doctor of Audiology  [place] License # [id number]

## 2020-01-21 ENCOUNTER — OFFICE VISIT (OUTPATIENT)
Dept: OTOLARYNGOLOGY | Facility: CLINIC | Age: 65
End: 2020-01-21
Payer: COMMERCIAL

## 2020-01-21 VITALS — SYSTOLIC BLOOD PRESSURE: 141 MMHG | HEART RATE: 69 BPM | OXYGEN SATURATION: 99 % | DIASTOLIC BLOOD PRESSURE: 89 MMHG

## 2020-01-21 DIAGNOSIS — H90.3 SNHL (SENSORY-NEURAL HEARING LOSS), ASYMMETRICAL: Primary | ICD-10-CM

## 2020-01-21 PROCEDURE — 99202 OFFICE O/P NEW SF 15 MIN: CPT | Performed by: OTOLARYNGOLOGY

## 2020-01-21 RX ORDER — INFLUENZA A VIRUS A/VICTORIA/4897/2022 IVR-238 (H1N1) ANTIGEN (FORMALDEHYDE INACTIVATED), INFLUENZA A VIRUS A/CALIFORNIA/122/2022 SAN-022 (H3N2) ANTIGEN (FORMALDEHYDE INACTIVATED), AND INFLUENZA B VIRUS B/MICHIGAN/01/2021 ANTIGEN (FORMALDEHYDE INACTIVATED) 60; 60; 60 UG/.5ML; UG/.5ML; UG/.5ML
INJECTION, SUSPENSION INTRAMUSCULAR
Refills: 0 | COMMUNITY
Start: 2019-09-20 | End: 2022-11-15

## 2020-01-21 ASSESSMENT — ENCOUNTER SYMPTOMS
DOUBLE VISION: 0
VOMITING: 0
HEMOPTYSIS: 0
TREMORS: 0
SORE THROAT: 0
HEADACHES: 0
BLURRED VISION: 0
TINGLING: 0
COUGH: 0
SPUTUM PRODUCTION: 0
SINUS PAIN: 0
BRUISES/BLEEDS EASILY: 0
HEARTBURN: 0
NAUSEA: 0
DIZZINESS: 0
STRIDOR: 0
CONSTITUTIONAL NEGATIVE: 1
PHOTOPHOBIA: 0

## 2020-01-21 ASSESSMENT — PAIN SCALES - GENERAL: PAINLEVEL: NO PAIN (0)

## 2020-01-21 NOTE — LETTER
1/21/2020         RE: Jena Ibarra  [address] [place] [address]        Dear Colleague,    Thank you for referring your patient, Jena Ibarra, to the [facility]. Please see a copy of my visit note below.    HPI    This is a 64 year old patient who has been referred for her asymmetrical hearing impairment. She indicates that she know she has hearing loss but was not aware of any symmetry in her hearing. Denies any ear drainage, pain, pressure, tinnitus, aural fullness, dizziness or vertigo. No hx of acoustic trauma, noise exposure, weakness, numbness, tingling, or vision issues.    Her hearing test: sloping to moderate level high frequency SNHL with asymmetry @ 3kHz, 4 Khz and 6 Khz. When I compared with the one from 2010, there was an asymmetry at 2kHz level. Recognition: excellent. Tymps: WNLs.    Review of Systems   Constitutional: Negative.    HENT: Positive for hearing loss. Negative for congestion, ear discharge, ear pain, nosebleeds, sinus pain, sore throat and tinnitus.    Eyes: Negative for blurred vision, double vision and photophobia.   Respiratory: Negative for cough, hemoptysis, sputum production and stridor.    Gastrointestinal: Negative for heartburn, nausea and vomiting.   Skin: Negative.    Neurological: Negative for dizziness, tingling, tremors and headaches.   Endo/Heme/Allergies: Positive for environmental allergies. Does not bruise/bleed easily.         Physical Exam  Vitals signs reviewed.   Constitutional:       Appearance: Normal appearance. She is normal weight.   HENT:      Head: Normocephalic and atraumatic.      Jaw: There is normal jaw occlusion.      Right Ear: Tympanic membrane, ear canal and external ear normal. Decreased hearing noted. No middle ear effusion. There is no impacted cerumen.      Left Ear: Tympanic membrane, ear canal and external ear normal. Decreased hearing noted.  No middle ear effusion. There is no impacted cerumen.      Nose:  Nose normal. No nasal deformity, septal deviation, laceration, congestion or rhinorrhea.      Mouth/Throat:      Mouth: Mucous membranes are moist.      Palate: Palate does not elevate in midline.      Pharynx: Oropharynx is clear.      Tonsils: No tonsillar exudate or tonsillar abscesses.   Eyes:      Extraocular Movements: Extraocular movements intact.      Pupils: Pupils are equal, round, and reactive to light.   Neurological:      Mental Status: She is alert.       A/P    This pleasant patient is having a slightly asymmetrical sensorineural hearing loss. Audiogram traces are parallel to each other, excellent recognition with no neuro-otologic symptoms and concerns, I will see her in the f/u in 5 months with a new hearing test. Her hearing test: sloping to moderate level high frequency SNHL with asymmetry @ 3kHz, 4 Khz and 6 Khz. When I compared with the one from 2010, there was an asymmetry at 2kHz level. Recognition: excellent. Tymps: WNLs. Her questions were answered.        Again, thank you for allowing me to participate in the care of your patient.        Sincerely,        Gela Holley MD

## 2020-01-21 NOTE — NURSING NOTE
Jena Ibarra's goals for this visit include:   Chief Complaint   Patient presents with     Consult     Left hearing worse than right, ref by Dr. Clay     She requests these members of her care team be copied on today's visit information: Yes    PCP: Leidy Andrea    Referring Provider:  No referring provider defined for this encounter.    BP (!) 141/89 (BP Location: Left arm, Patient Position: Sitting, Cuff Size: Adult Regular)   Pulse 69   SpO2 99%     Do you need any medication refills at today's visit? No    Ale Valentino LPN

## 2020-01-21 NOTE — PROGRESS NOTES
HPI    This is a 64 year old patient who has been referred for her asymmetrical hearing impairment. She indicates that she know she has hearing loss but was not aware of any symmetry in her hearing. Denies any ear drainage, pain, pressure, tinnitus, aural fullness, dizziness or vertigo. No hx of acoustic trauma, noise exposure, weakness, numbness, tingling, or vision issues.    Her hearing test: sloping to moderate level high frequency SNHL with asymmetry @ 3kHz, 4 Khz and 6 Khz. When I compared with the one from 2010, there was an asymmetry at 2kHz level. Recognition: excellent. Tymps: WNLs.    Review of Systems   Constitutional: Negative.    HENT: Positive for hearing loss. Negative for congestion, ear discharge, ear pain, nosebleeds, sinus pain, sore throat and tinnitus.    Eyes: Negative for blurred vision, double vision and photophobia.   Respiratory: Negative for cough, hemoptysis, sputum production and stridor.    Gastrointestinal: Negative for heartburn, nausea and vomiting.   Skin: Negative.    Neurological: Negative for dizziness, tingling, tremors and headaches.   Endo/Heme/Allergies: Positive for environmental allergies. Does not bruise/bleed easily.         Physical Exam  Vitals signs reviewed.   Constitutional:       Appearance: Normal appearance. She is normal weight.   HENT:      Head: Normocephalic and atraumatic.      Jaw: There is normal jaw occlusion.      Right Ear: Tympanic membrane, ear canal and external ear normal. Decreased hearing noted. No middle ear effusion. There is no impacted cerumen.      Left Ear: Tympanic membrane, ear canal and external ear normal. Decreased hearing noted.  No middle ear effusion. There is no impacted cerumen.      Nose: Nose normal. No nasal deformity, septal deviation, laceration, congestion or rhinorrhea.      Mouth/Throat:      Mouth: Mucous membranes are moist.      Palate: Palate does not elevate in midline.      Pharynx: Oropharynx is clear.      Tonsils:  No tonsillar exudate or tonsillar abscesses.   Eyes:      Extraocular Movements: Extraocular movements intact.      Pupils: Pupils are equal, round, and reactive to light.   Neurological:      Mental Status: She is alert.       A/P    This pleasant patient is having a slightly asymmetrical sensorineural hearing loss. Audiogram traces are parallel to each other, excellent recognition with no neuro-otologic symptoms and concerns, I will see her in the f/u in 5 months with a new hearing test. Her hearing test: sloping to moderate level high frequency SNHL with asymmetry @ 3kHz, 4 Khz and 6 Khz. When I compared with the one from 2010, there was an asymmetry at 2kHz level. Recognition: excellent. Tymps: WNLs. Her questions were answered.

## 2020-06-23 ENCOUNTER — OFFICE VISIT (OUTPATIENT)
Dept: OTOLARYNGOLOGY | Facility: CLINIC | Age: 65
End: 2020-06-23
Payer: COMMERCIAL

## 2020-06-23 ENCOUNTER — OFFICE VISIT (OUTPATIENT)
Dept: AUDIOLOGY | Facility: CLINIC | Age: 65
End: 2020-06-23
Payer: COMMERCIAL

## 2020-06-23 VITALS — DIASTOLIC BLOOD PRESSURE: 95 MMHG | HEART RATE: 66 BPM | OXYGEN SATURATION: 100 % | SYSTOLIC BLOOD PRESSURE: 157 MMHG

## 2020-06-23 DIAGNOSIS — H90.3 SENSORINEURAL HEARING LOSS (SNHL) OF BOTH EARS: Primary | ICD-10-CM

## 2020-06-23 DIAGNOSIS — H90.3 SENSORINEURAL HEARING LOSS (SNHL) OF BOTH EARS: ICD-10-CM

## 2020-06-23 DIAGNOSIS — H90.3 SNHL (SENSORY-NEURAL HEARING LOSS), ASYMMETRICAL: Primary | ICD-10-CM

## 2020-06-23 PROCEDURE — 92557 COMPREHENSIVE HEARING TEST: CPT | Performed by: AUDIOLOGIST

## 2020-06-23 PROCEDURE — 92550 TYMPANOMETRY & REFLEX THRESH: CPT | Performed by: AUDIOLOGIST

## 2020-06-23 PROCEDURE — 99213 OFFICE O/P EST LOW 20 MIN: CPT | Performed by: OTOLARYNGOLOGY

## 2020-06-23 RX ORDER — CETIRIZINE HYDROCHLORIDE 10 MG/1
10 TABLET ORAL DAILY PRN
COMMUNITY

## 2020-06-23 ASSESSMENT — PAIN SCALES - GENERAL: PAINLEVEL: NO PAIN (0)

## 2020-06-23 NOTE — PROGRESS NOTES
AUDIOLOGY REPORT    SUMMARY: Audiology visit completed. See audiogram for results.    RECOMMENDATIONS: Follow-up with ENT.    Mima Stewart  Doctor of Audiology  [place] License # [id number]

## 2020-06-23 NOTE — NURSING NOTE
Jena Ibarra's goals for this visit include:   Chief Complaint   Patient presents with     RECHECK     5 month recheck with audio prior     She requests these members of her care team be copied on today's visit information: Yes    PCP: Leidy Andrea    Referring Provider:  No referring provider defined for this encounter.    BP (!) 157/95 (BP Location: Left arm, Patient Position: Sitting, Cuff Size: Adult Regular)   Pulse 66   SpO2 100%     Do you need any medication refills at today's visit? No    Ale Valentino LPN

## 2020-06-23 NOTE — PROGRESS NOTES
HPI    This 65 year old patient is here for the f/u. She indicates that her hearing is worse today and fluctuates in her left ear. She has been referred for her asymmetrical hearing impairment. She indicates that she know she has hearing loss but was not aware of any symmetry in her hearing. Denies any ear drainage, pain, pressure, tinnitus, aural fullness, dizziness or vertigo. No hx of acoustic trauma, noise exposure, weakness, numbness, tingling, or vision issues.    Her hearing test: sloping to moderate level high frequency SNHL with asymmetry @ 3kHz, 4 Khz and 6 Khz. When I compared with the one from 2010, there was an asymmetry at 2kHz level. Recognition: excellent. Tymps: WNLs.    Review of Systems   Constitutional: Negative.    HENT: Positive for hearing loss. Negative for congestion, ear discharge, ear pain, nosebleeds, sinus pain, sore throat and tinnitus.    Eyes: Negative for blurred vision, double vision and photophobia.   Respiratory: Negative for cough, hemoptysis, sputum production and stridor.    Gastrointestinal: Negative for heartburn, nausea and vomiting.   Skin: Negative.    Neurological: Negative for dizziness, tingling, tremors and headaches.   Endo/Heme/Allergies: Positive for environmental allergies. Does not bruise/bleed easily.         Physical Exam  Vitals signs reviewed.   Constitutional:       Appearance: Normal appearance. She is normal weight.   HENT:      Head: Normocephalic and atraumatic.      Jaw: There is normal jaw occlusion.      Right Ear: Tympanic membrane, ear canal and external ear normal. Decreased hearing noted. No middle ear effusion. There is no impacted cerumen.      Left Ear: Tympanic membrane, ear canal and external ear normal. Decreased hearing noted.  No middle ear effusion. There is no impacted cerumen.      Nose: Nose normal. No nasal deformity, septal deviation, laceration, congestion or rhinorrhea.      Mouth/Throat:      Mouth: Mucous membranes are moist.       Palate: Palate does not elevate in midline.      Pharynx: Oropharynx is clear.      Tonsils: No tonsillar exudate or tonsillar abscesses.   Eyes:      Extraocular Movements: Extraocular movements intact.      Pupils: Pupils are equal, round, and reactive to light.   Neurological:      Mental Status: She is alert.       A/P    This pleasant patient is having a slightly asymmetrical sensorineural hearing loss. Audiogram traces are parallel to each other, excellent recognition with no neuro-otologic symptoms and concerns, I will see her in the f/u in 5 months with a new hearing test. Her hearing test: sloping to moderate level high frequency SNHL with asymmetry @ 2kHz, 3kHz, 4 Khz and 6 Khz. Recognition: excellent. Tymps: WNLs. I will request an MRI to r/o any CPA lesions and give her a call. Her questions were answered.

## 2020-06-23 NOTE — LETTER
6/23/2020         RE: Jena Ibarra  [address] [place] [address]        Dear Colleague,    Thank you for referring your patient, Jena Ibarra, to the [facility]. Please see a copy of my visit note below.    HPI    This 65 year old patient is here for the f/u. She indicates that her hearing is worse today and fluctuates in her left ear. She has been referred for her asymmetrical hearing impairment. She indicates that she know she has hearing loss but was not aware of any symmetry in her hearing. Denies any ear drainage, pain, pressure, tinnitus, aural fullness, dizziness or vertigo. No hx of acoustic trauma, noise exposure, weakness, numbness, tingling, or vision issues.    Her hearing test: sloping to moderate level high frequency SNHL with asymmetry @ 3kHz, 4 Khz and 6 Khz. When I compared with the one from 2010, there was an asymmetry at 2kHz level. Recognition: excellent. Tymps: WNLs.    Review of Systems   Constitutional: Negative.    HENT: Positive for hearing loss. Negative for congestion, ear discharge, ear pain, nosebleeds, sinus pain, sore throat and tinnitus.    Eyes: Negative for blurred vision, double vision and photophobia.   Respiratory: Negative for cough, hemoptysis, sputum production and stridor.    Gastrointestinal: Negative for heartburn, nausea and vomiting.   Skin: Negative.    Neurological: Negative for dizziness, tingling, tremors and headaches.   Endo/Heme/Allergies: Positive for environmental allergies. Does not bruise/bleed easily.         Physical Exam  Vitals signs reviewed.   Constitutional:       Appearance: Normal appearance. She is normal weight.   HENT:      Head: Normocephalic and atraumatic.      Jaw: There is normal jaw occlusion.      Right Ear: Tympanic membrane, ear canal and external ear normal. Decreased hearing noted. No middle ear effusion. There is no impacted cerumen.      Left Ear: Tympanic membrane, ear canal and external ear normal.  Decreased hearing noted.  No middle ear effusion. There is no impacted cerumen.      Nose: Nose normal. No nasal deformity, septal deviation, laceration, congestion or rhinorrhea.      Mouth/Throat:      Mouth: Mucous membranes are moist.      Palate: Palate does not elevate in midline.      Pharynx: Oropharynx is clear.      Tonsils: No tonsillar exudate or tonsillar abscesses.   Eyes:      Extraocular Movements: Extraocular movements intact.      Pupils: Pupils are equal, round, and reactive to light.   Neurological:      Mental Status: She is alert.       A/P    This pleasant patient is having a slightly asymmetrical sensorineural hearing loss. Audiogram traces are parallel to each other, excellent recognition with no neuro-otologic symptoms and concerns, I will see her in the f/u in 5 months with a new hearing test. Her hearing test: sloping to moderate level high frequency SNHL with asymmetry @ 2kHz, 3kHz, 4 Khz and 6 Khz. Recognition: excellent. Tymps: WNLs. I will request an MRI to r/o any CPA lesions and give her a call. Her questions were answered.        Again, thank you for allowing me to participate in the care of your patient.        Sincerely,        Gela Holley MD

## 2020-07-08 ENCOUNTER — ANCILLARY PROCEDURE (OUTPATIENT)
Dept: MRI IMAGING | Facility: CLINIC | Age: 65
End: 2020-07-08
Attending: OTOLARYNGOLOGY
Payer: COMMERCIAL

## 2020-07-08 DIAGNOSIS — H90.3 SENSORINEURAL HEARING LOSS (SNHL) OF BOTH EARS: ICD-10-CM

## 2020-07-08 PROCEDURE — A9585 GADOBUTROL INJECTION: HCPCS | Performed by: RADIOLOGY

## 2020-07-08 PROCEDURE — 70553 MRI BRAIN STEM W/O & W/DYE: CPT | Performed by: RADIOLOGY

## 2020-07-08 RX ORDER — GADOBUTROL 604.72 MG/ML
10 INJECTION INTRAVENOUS ONCE
Status: COMPLETED | OUTPATIENT
Start: 2020-07-08 | End: 2020-07-08

## 2020-07-08 RX ADMIN — GADOBUTROL 8 ML: 604.72 INJECTION INTRAVENOUS at 09:28

## 2020-07-14 ENCOUNTER — TELEPHONE (OUTPATIENT)
Dept: OTOLARYNGOLOGY | Facility: CLINIC | Age: 65
End: 2020-07-14

## 2020-07-14 NOTE — TELEPHONE ENCOUNTER
Left voicemail for patient regarding normal MRI results. Provided clinic phone number to call with any questions or concerns.    Ale Valentino LPN

## 2020-07-14 NOTE — TELEPHONE ENCOUNTER
----- Message from Gela Holley MD sent at 7/14/2020 11:07 AM CDT -----  MRI is WNLs. F/u in a year. I have checked her MRI sections; there was no infections in her sinuses as well.

## 2020-10-27 ENCOUNTER — ANCILLARY PROCEDURE (OUTPATIENT)
Dept: MAMMOGRAPHY | Facility: CLINIC | Age: 65
End: 2020-10-27
Payer: COMMERCIAL

## 2020-10-27 DIAGNOSIS — Z12.31 VISIT FOR SCREENING MAMMOGRAM: ICD-10-CM

## 2020-10-27 PROCEDURE — 77067 SCR MAMMO BI INCL CAD: CPT | Mod: TC | Performed by: RADIOLOGY

## 2020-12-13 ENCOUNTER — HEALTH MAINTENANCE LETTER (OUTPATIENT)
Age: 65
End: 2020-12-13

## 2021-02-21 ENCOUNTER — HEALTH MAINTENANCE LETTER (OUTPATIENT)
Age: 66
End: 2021-02-21

## 2021-03-26 ENCOUNTER — TELEPHONE (OUTPATIENT)
Dept: GASTROENTEROLOGY | Facility: CLINIC | Age: 66
End: 2021-03-26

## 2021-03-26 DIAGNOSIS — K86.89 ATROPHIC PANCREAS: Primary | ICD-10-CM

## 2021-03-26 NOTE — TELEPHONE ENCOUNTER
[facility] Call Center    Phone Message    May a detailed message be left on voicemail: yes     Reason for Call: Order(s): Other:   Reason for requested: Abdominal MRI  Date needed: patient stated that this was suppose to be done last year, but held off due to covid.  Provider name: Mil      Action Taken: Message routed to:  Adult Clinics: Gastroenterology (GI) p [id number]    Travel Screening: Not Applicable

## 2021-03-26 NOTE — TELEPHONE ENCOUNTER
Result note for MRCP on 12/20/2019 recommended repeat in 1 year.      Patient contacted, informed message will be sent to Dr. Jaegre for order.  Patient is aware that provider is not in office until Monday.     Reanna Argueta RN

## 2021-03-29 NOTE — TELEPHONE ENCOUNTER
LPN spoke with patient and informed her that MRI has been ordered. LPN provided patient with imaging scheduling number and also provided her with a warm transfer to imaging scheduling.     Ric Jaeger MD Neubauer, Julie, RN; [facility] 10 hours ago (9:57 PM)     MRI ordered.  Thanks.    Message text      Tonja Martinez LPN

## 2021-04-09 ENCOUNTER — ANCILLARY PROCEDURE (OUTPATIENT)
Dept: MRI IMAGING | Facility: CLINIC | Age: 66
End: 2021-04-09
Attending: INTERNAL MEDICINE
Payer: COMMERCIAL

## 2021-04-09 DIAGNOSIS — K86.89 ATROPHIC PANCREAS: ICD-10-CM

## 2021-04-09 PROCEDURE — A9585 GADOBUTROL INJECTION: HCPCS | Mod: JW | Performed by: RADIOLOGY

## 2021-04-09 PROCEDURE — 74183 MRI ABD W/O CNTR FLWD CNTR: CPT | Mod: GC | Performed by: RADIOLOGY

## 2021-04-09 RX ORDER — GADOBUTROL 604.72 MG/ML
10 INJECTION INTRAVENOUS ONCE
Status: COMPLETED | OUTPATIENT
Start: 2021-04-09 | End: 2021-04-09

## 2021-04-09 RX ADMIN — GADOBUTROL 8 ML: 604.72 INJECTION INTRAVENOUS at 08:25

## 2021-04-09 NOTE — RESULT ENCOUNTER NOTE
Awais Bella,    Thank you for your recent office visit.    Here are your recent results.  Per GI-                                                                  IMPRESSION:  1. No suspicious pancreatic mass.  2. Atrophic appearance of the pancreas with a few small prominent side  branches, likely representing chronic pancreatitis.    If there was significant diarrhea or weight loss, then a trial of pancreatic enzymes could be considered.  I do not think any additional imaging at this time is needed.  If there are additional GI symptoms, then we can see back in the clinic.     Feel free to contact me via [facility] or call the clinic at [phone].    Sincerely,    Leidy Andrea, DEANDRE, FNP-BC

## 2021-05-03 ASSESSMENT — ENCOUNTER SYMPTOMS
FREQUENCY: 0
EYE PAIN: 0
DYSURIA: 0
HEMATOCHEZIA: 0
PARESTHESIAS: 0
HEMATURIA: 0
BREAST MASS: 0
ABDOMINAL PAIN: 0
NERVOUS/ANXIOUS: 0
COUGH: 0
FEVER: 0
DIARRHEA: 0
HEARTBURN: 0
NAUSEA: 0
PALPITATIONS: 0
SHORTNESS OF BREATH: 0
ARTHRALGIAS: 0
DIZZINESS: 0
CHILLS: 0
CONSTIPATION: 0
JOINT SWELLING: 0
WEAKNESS: 0
HEADACHES: 0
SORE THROAT: 0
MYALGIAS: 0

## 2021-05-03 ASSESSMENT — ACTIVITIES OF DAILY LIVING (ADL): CURRENT_FUNCTION: NO ASSISTANCE NEEDED

## 2021-05-04 ENCOUNTER — OFFICE VISIT (OUTPATIENT)
Dept: FAMILY MEDICINE | Facility: CLINIC | Age: 66
End: 2021-05-04
Payer: COMMERCIAL

## 2021-05-04 VITALS
HEART RATE: 79 BPM | OXYGEN SATURATION: 99 % | RESPIRATION RATE: 20 BRPM | TEMPERATURE: 97.3 F | WEIGHT: 163.4 LBS | HEIGHT: 66 IN | SYSTOLIC BLOOD PRESSURE: 107 MMHG | DIASTOLIC BLOOD PRESSURE: 76 MMHG | BODY MASS INDEX: 26.26 KG/M2

## 2021-05-04 DIAGNOSIS — Z00.00 ENCOUNTER FOR WELLNESS EXAMINATION IN ADULT: Primary | ICD-10-CM

## 2021-05-04 DIAGNOSIS — Z13.220 LIPID SCREENING: ICD-10-CM

## 2021-05-04 DIAGNOSIS — K86.1 CHRONIC PANCREATITIS, UNSPECIFIED PANCREATITIS TYPE (H): ICD-10-CM

## 2021-05-04 DIAGNOSIS — L82.0 INFLAMED SEBORRHEIC KERATOSIS: ICD-10-CM

## 2021-05-04 LAB
ALBUMIN SERPL-MCNC: 4 G/DL (ref 3.4–5)
ALP SERPL-CCNC: 62 U/L (ref 40–150)
ALT SERPL W P-5'-P-CCNC: 25 U/L (ref 0–50)
AMYLASE SERPL-CCNC: 18 U/L (ref 30–110)
ANION GAP SERPL CALCULATED.3IONS-SCNC: 5 MMOL/L (ref 3–14)
AST SERPL W P-5'-P-CCNC: 16 U/L (ref 0–45)
BILIRUB SERPL-MCNC: 0.8 MG/DL (ref 0.2–1.3)
BUN SERPL-MCNC: 11 MG/DL (ref 7–30)
CALCIUM SERPL-MCNC: 9.2 MG/DL (ref 8.5–10.1)
CHLORIDE SERPL-SCNC: 107 MMOL/L (ref 94–109)
CHOLEST SERPL-MCNC: 219 MG/DL
CO2 SERPL-SCNC: 27 MMOL/L (ref 20–32)
CREAT SERPL-MCNC: 1.01 MG/DL (ref 0.52–1.04)
GFR SERPL CREATININE-BSD FRML MDRD: 57 ML/MIN/{1.73_M2}
GLUCOSE SERPL-MCNC: 87 MG/DL (ref 70–99)
HDLC SERPL-MCNC: 68 MG/DL
LDLC SERPL CALC-MCNC: 135 MG/DL
LIPASE SERPL-CCNC: 40 U/L (ref 73–393)
NONHDLC SERPL-MCNC: 151 MG/DL
POTASSIUM SERPL-SCNC: 4.6 MMOL/L (ref 3.4–5.3)
PROT SERPL-MCNC: 7.2 G/DL (ref 6.8–8.8)
SODIUM SERPL-SCNC: 139 MMOL/L (ref 133–144)
TRIGL SERPL-MCNC: 81 MG/DL

## 2021-05-04 PROCEDURE — 17110 DESTRUCTION B9 LES UP TO 14: CPT | Performed by: PHYSICIAN ASSISTANT

## 2021-05-04 PROCEDURE — 36415 COLL VENOUS BLD VENIPUNCTURE: CPT | Performed by: PHYSICIAN ASSISTANT

## 2021-05-04 PROCEDURE — G0439 PPPS, SUBSEQ VISIT: HCPCS | Performed by: PHYSICIAN ASSISTANT

## 2021-05-04 PROCEDURE — 83690 ASSAY OF LIPASE: CPT | Performed by: PHYSICIAN ASSISTANT

## 2021-05-04 PROCEDURE — 80053 COMPREHEN METABOLIC PANEL: CPT | Performed by: PHYSICIAN ASSISTANT

## 2021-05-04 PROCEDURE — 82150 ASSAY OF AMYLASE: CPT | Performed by: PHYSICIAN ASSISTANT

## 2021-05-04 PROCEDURE — 80061 LIPID PANEL: CPT | Performed by: PHYSICIAN ASSISTANT

## 2021-05-04 ASSESSMENT — ENCOUNTER SYMPTOMS
JOINT SWELLING: 0
SORE THROAT: 0
DYSURIA: 0
BREAST MASS: 0
SHORTNESS OF BREATH: 0
HEMATURIA: 0
MYALGIAS: 0
CONSTIPATION: 0
NAUSEA: 0
ABDOMINAL PAIN: 0
EYE PAIN: 0
ARTHRALGIAS: 0
COUGH: 0
CHILLS: 0
FREQUENCY: 0
NERVOUS/ANXIOUS: 0
WEAKNESS: 0
HEMATOCHEZIA: 0
HEADACHES: 0
PALPITATIONS: 0
PARESTHESIAS: 0
HEARTBURN: 0
FEVER: 0
DIARRHEA: 0
DIZZINESS: 0

## 2021-05-04 ASSESSMENT — MIFFLIN-ST. JEOR: SCORE: 1279.99

## 2021-05-04 ASSESSMENT — ACTIVITIES OF DAILY LIVING (ADL): CURRENT_FUNCTION: NO ASSISTANCE NEEDED

## 2021-05-04 NOTE — PROGRESS NOTES
"SUBJECTIVE:   Jena Ibarra is a 66 year old female who presents for Preventive Visit.      Patient has been advised of split billing requirements and indicates understanding: No   Are you in the first 12 months of your Medicare coverage?  No    Healthy Habits:     In general, how would you rate your overall health?  Good    Frequency of exercise:  6-7 days/week    Duration of exercise:  45-60 minutes    Do you usually eat at least 4 servings of fruit and vegetables a day, include whole grains    & fiber and avoid regularly eating high fat or \"junk\" foods?  Yes    Taking medications regularly:  Not Applicable    Medication side effects:  Not applicable    Ability to successfully perform activities of daily living:  No assistance needed    Home Safety:  No safety concerns identified    Hearing Impairment:  No hearing concerns    In the past 6 months, have you been bothered by leaking of urine?  No    In general, how would you rate your overall mental or emotional health?  Excellent      PHQ-2 Total Score: 0    Additional concerns today:  Yes    Do you feel safe in your environment? Yes    Have you ever done Advance Care Planning? (For example, a Health Directive, POLST, or a discussion with a medical provider or your loved ones about your wishes): No, advance care planning information given to patient to review.  Patient plans to discuss their wishes with loved ones or provider.       No hearing concerns - had this checked last year    Fall risk  Fallen 2 or more times in the past year?: No  Any fall with injury in the past year?: No    Cognitive Screening   1) Repeat 3 items (Leader, Season, Table)    2) Clock draw: NORMAL  3) 3 item recall: Recalls 3 objects  Results: 3 items recalled: COGNITIVE IMPAIRMENT LESS LIKELY    Mini-CogTM Copyright TRA Singh. Licensed by the author for use in [facility]; reprinted with permission ([email or web address]@.[facility]). All rights reserved.      Do you have sleep apnea, excessive " snoring or daytime drowsiness?: yes    Reviewed and updated as needed this visit by clinical staff  Tobacco  Allergies  Meds  Problems    Fam Hx  Soc Hx        Reviewed and updated as needed this visit by Provider   Allergies  Meds  Problems            Social History     Tobacco Use     Smoking status: Never Smoker     Smokeless tobacco: Never Used   Substance Use Topics     Alcohol use: Yes     Comment: occasional         Alcohol Use 5/3/2021   Prescreen: >3 drinks/day or >7 drinks/week? Not Applicable   Prescreen: >3 drinks/day or >7 drinks/week? -           Concerns:  Some intermittent epigastric pain. Recent mri suggestive of chronic pancreatitis.   Rare/occasional diarrhea.  Denies alcohol consumption.     Current providers sharing in care for this patient include:   Patient Care Team:  Leidy Andrea NP as PCP - General (Nurse Practitioner - Family)  Gopi Dee PA-C as Assigned PCP  Gela Holley MD as Assigned Surgical Provider    The following health maintenance items are reviewed in Epic and correct as of today:  Health Maintenance Due   Topic Date Due     ZOSTER IMMUNIZATION (1 of 2) Never done     FALL RISK ASSESSMENT  12/23/2020     Lab work is in process  Labs reviewed in EPIC  BP Readings from Last 3 Encounters:   05/04/21 107/76   06/23/20 (!) 157/95   01/21/20 (!) 141/89    Wt Readings from Last 3 Encounters:   05/04/21 74.1 kg (163 lb 6.4 oz)   12/23/19 77.2 kg (170 lb 3.2 oz)   10/15/19 76.7 kg (169 lb)                  Patient Active Problem List   Diagnosis     CARDIOVASCULAR SCREENING; LDL GOAL LESS THAN 160     Radial styloid tenosynovitis     CTS (carpal tunnel syndrome)     Atrophic vaginitis     Gallstones     Abnormal biliary HIDA scan     S/P laparoscopic cholecystectomy     Past Surgical History:   Procedure Laterality Date     BIOPSY       CHOLECYSTECTOMY       COLONOSCOPY       COLONOSCOPY WITH CO2 INSUFFLATION N/A 10/13/2014    Procedure: COLONOSCOPY WITH CO2  INSUFFLATION;  Surgeon: Jamal Beach MD;  Location: [facility] OR     COLONOSCOPY WITH CO2 INSUFFLATION N/A 11/14/2017    Procedure: COLONOSCOPY WITH CO2 INSUFFLATION;  COLON-RECTAL BLEEDING / BARUSYA;  Surgeon: Henok Jewell MD;  Location: [facility] OR     COMBINED ESOPHAGOSCOPY, GASTROSCOPY, DUODENOSCOPY (EGD) WITH CO2 INSUFFLATION N/A 7/25/2019    Procedure: ESOPHAGOGASTRODUODENOSCOPY, WITH CO2 INSUFFLATION;  Surgeon: Jamal Beach MD;  Location: [facility] OR     ESOPHAGOSCOPY, GASTROSCOPY, DUODENOSCOPY (EGD), COMBINED N/A 7/25/2019    Procedure: Esophagogastroduodenoscopy, With Biopsy;  Surgeon: Jamal Beach MD;  Location: [facility] OR     ESOPHAGOSCOPY, GASTROSCOPY, DUODENOSCOPY (EGD), COMBINED N/A 9/18/2019    Procedure: ESOPHAGOGASTRODUODENOSCOPY, WITH ENDOSCOPIC US;  Surgeon: Ric Jaeger MD;  Location: [facility] OR     EYE SURGERY       TUBAL LIGATION         Social History     Tobacco Use     Smoking status: Never Smoker     Smokeless tobacco: Never Used   Substance Use Topics     Alcohol use: Yes     Comment: occasional     Family History   Problem Relation Age of Onset     Cancer Mother         kidney     Diabetes Mother      Heart Disease Mother      Lipids Mother      Hypertension Mother      Colon Polyps Mother          benign     Hyperlipidemia Mother      Other Cancer Mother         Kidney     Osteoporosis Mother      Obesity Mother      Arthritis Father      Lipids Father      Hypertension Father      Osteoporosis Father      Hyperlipidemia Father      Prostate Cancer Father      Anxiety Disorder Father      Mental Illness Father      Arthritis Sister      Neurologic Disorder Sister         seizures     Obesity Sister      Heart Disease Sister      Cancer Brother         pancreatic     Coronary Artery Disease Brother      Obesity Brother      Other Cancer Brother         Pancreas     Thyroid Disease Son      Obesity Sister      Breast Cancer No family hx of          Current  Outpatient Medications   Medication Sig Dispense Refill     cetirizine (ZYRTEC) 10 MG tablet Take 10 mg by mouth daily as needed for allergies       FLUZONE HIGH-DOSE 0.5 ML injection TO BE ADMINISTERED BY PHARMACIST FOR IMMUNIZATION  0     No Known Allergies  Recent Labs   Lab Test 12/23/19  0913 07/16/19  1015 04/09/19  1635 10/27/17  0826 10/13/17  1554 09/04/14  1349   *  --   --  151*  --   --    HDL 60  --   --  70  --   --    TRIG 150*  --   --  185*  --   --    ALT  --  39 27  --   --  25   CR  --   --   --   --  0.83 0.93   GFRESTIMATED  --   --   --   --  69 61   GFRESTBLACK  --   --   --   --  84 74   POTASSIUM  --   --   --   --  4.0 4.1   TSH 1.60  --   --   --  1.95 1.86          Breast CA Risk Assessment (FHS-7) 5/3/2021   Do you have a family history of breast, colon, or ovarian cancer? No / Unknown       click delete button to remove this line now    Pertinent mammograms are reviewed under the imaging tab.    Review of Systems   Constitutional: Negative for chills and fever.   HENT: Negative for congestion, ear pain, hearing loss and sore throat.    Eyes: Negative for pain and visual disturbance.   Respiratory: Negative for cough and shortness of breath.    Cardiovascular: Negative for chest pain, palpitations and peripheral edema.   Gastrointestinal: Negative for abdominal pain, constipation, diarrhea, heartburn, hematochezia and nausea.   Breasts:  Negative for tenderness, breast mass and discharge.   Genitourinary: Negative for dysuria, frequency, genital sores, hematuria, pelvic pain, urgency, vaginal bleeding and vaginal discharge.   Musculoskeletal: Negative for arthralgias, joint swelling and myalgias.   Skin: Negative for rash.   Neurological: Negative for dizziness, weakness, headaches and paresthesias.   Psychiatric/Behavioral: Negative for mood changes. The patient is not nervous/anxious.      Constitutional, HEENT, cardiovascular, pulmonary, GI, , musculoskeletal, neuro, skin,  "endocrine and psych systems are negative, except as otherwise noted.    OBJECTIVE:   /76   Pulse 79   Temp 97.3  F (36.3  C) (Tympanic)   Resp 20   Ht 1.664 m (5' 5.5\")   Wt 74.1 kg (163 lb 6.4 oz)   SpO2 99%   BMI 26.78 kg/m   Estimated body mass index is 26.78 kg/m  as calculated from the following:    Height as of this encounter: 1.664 m (5' 5.5\").    Weight as of this encounter: 74.1 kg (163 lb 6.4 oz).  Physical Exam  GENERAL APPEARANCE: healthy, alert and no distress  EYES: Eyes grossly normal to inspection, PERRL and conjunctivae and sclerae normal  HENT: ear canals and TM's normal, nose and mouth without ulcers or lesions, oropharynx clear and oral mucous membranes moist  NECK: no adenopathy, no asymmetry, masses, or scars and thyroid normal to palpation  RESP: lungs clear to auscultation - no rales, rhonchi or wheezes  BREAST: normal without masses, tenderness or nipple discharge and no palpable axillary masses or adenopathy  CV: regular rate and rhythm, normal S1 S2, no S3 or S4, no murmur, click or rub, no peripheral edema and peripheral pulses strong  ABDOMEN: soft, nontender, no hepatosplenomegaly, no masses and bowel sounds normal  MS: no musculoskeletal defects are noted and gait is age appropriate without ataxia  SKIN: 2 mildly inflamed seborrheic keratosis. One on her anterior neck and one on her lower back.  Each lesion tx'd with 2 ftc's of cryotherapy.   NEURO: Normal strength and tone, sensory exam grossly normal, mentation intact and speech normal  PSYCH: mentation appears normal and affect normal/bright    Diagnostic Test Results:  Labs reviewed in Epic    ASSESSMENT / PLAN:       ICD-10-CM    1. Encounter for wellness examination in adult  Z00.00    2. Lipid screening  Z13.220 Lipid panel reflex to direct LDL Fasting   3. Chronic pancreatitis, unspecified pancreatitis type (H)  K86.1 Lipase     Amylase     Comprehensive metabolic panel (BMP + Alb, Alk Phos, ALT, AST, Total. Bili, " "TP)   4. Inflamed seborrheic keratosis  L82.0 DESTRUCT BENIGN LESION, UP TO 14     work on lifestyle modification  Advised supportive and symptomatic treatment.  Follow up with Provider - if condition persists or worsens.     Patient has been advised of split billing requirements and indicates understanding: Yes  COUNSELING:  Reviewed preventive health counseling, as reflected in patient instructions       Regular exercise       Healthy diet/nutrition    Estimated body mass index is 26.78 kg/m  as calculated from the following:    Height as of this encounter: 1.664 m (5' 5.5\").    Weight as of this encounter: 74.1 kg (163 lb 6.4 oz).        She reports that she has never smoked. She has never used smokeless tobacco.      Appropriate preventive services were discussed with this patient, including applicable screening as appropriate for cardiovascular disease, diabetes, osteopenia/osteoporosis, and glaucoma.  As appropriate for age/gender, discussed screening for colorectal cancer, prostate cancer, breast cancer, and cervical cancer. Checklist reviewing preventive services available has been given to the patient.    Reviewed patients plan of care and provided an AVS. The Basic Care Plan (routine screening as documented in Health Maintenance) for Jena meets the Care Plan requirement. This Care Plan has been established and reviewed with the Patient.    Counseling Resources:  ATP IV Guidelines  Pooled Cohorts Equation Calculator  Breast Cancer Risk Calculator  Breast Cancer: Medication to Reduce Risk  FRAX Risk Assessment  ICSI Preventive Guidelines  Dietary Guidelines for Americans, 2010  USDA's MyPlate  ASA Prophylaxis  Lung CA Screening    OCTAVIA Palma [facility] TIA    Identified Health Risks:  "

## 2021-09-26 ENCOUNTER — HEALTH MAINTENANCE LETTER (OUTPATIENT)
Age: 66
End: 2021-09-26

## 2021-11-16 ENCOUNTER — ANCILLARY PROCEDURE (OUTPATIENT)
Dept: MAMMOGRAPHY | Facility: CLINIC | Age: 66
End: 2021-11-16
Attending: PHYSICIAN ASSISTANT
Payer: COMMERCIAL

## 2021-11-16 DIAGNOSIS — Z12.31 VISIT FOR SCREENING MAMMOGRAM: ICD-10-CM

## 2021-11-16 PROCEDURE — 77067 SCR MAMMO BI INCL CAD: CPT | Mod: GC

## 2021-11-16 PROCEDURE — 77063 BREAST TOMOSYNTHESIS BI: CPT | Mod: GC

## 2022-07-03 ENCOUNTER — HEALTH MAINTENANCE LETTER (OUTPATIENT)
Age: 67
End: 2022-07-03

## 2022-11-15 ENCOUNTER — OFFICE VISIT (OUTPATIENT)
Dept: FAMILY MEDICINE | Facility: CLINIC | Age: 67
End: 2022-11-15
Payer: COMMERCIAL

## 2022-11-15 VITALS
RESPIRATION RATE: 20 BRPM | TEMPERATURE: 98.2 F | HEART RATE: 94 BPM | HEIGHT: 66 IN | DIASTOLIC BLOOD PRESSURE: 76 MMHG | WEIGHT: 175.6 LBS | OXYGEN SATURATION: 96 % | SYSTOLIC BLOOD PRESSURE: 130 MMHG | BODY MASS INDEX: 28.22 KG/M2

## 2022-11-15 DIAGNOSIS — Z00.00 ENCOUNTER FOR MEDICARE ANNUAL WELLNESS EXAM: Primary | ICD-10-CM

## 2022-11-15 DIAGNOSIS — Z12.11 COLON CANCER SCREENING: ICD-10-CM

## 2022-11-15 DIAGNOSIS — K64.8 INTERNAL HEMORRHOIDS: ICD-10-CM

## 2022-11-15 DIAGNOSIS — K86.1 CHRONIC PANCREATITIS, UNSPECIFIED PANCREATITIS TYPE (H): ICD-10-CM

## 2022-11-15 DIAGNOSIS — Z13.220 LIPID SCREENING: ICD-10-CM

## 2022-11-15 DIAGNOSIS — Z12.11 SCREEN FOR COLON CANCER: ICD-10-CM

## 2022-11-15 LAB
ALBUMIN SERPL-MCNC: 4 G/DL (ref 3.4–5)
ALP SERPL-CCNC: 66 U/L (ref 40–150)
ALT SERPL W P-5'-P-CCNC: 28 U/L (ref 0–50)
ANION GAP SERPL CALCULATED.3IONS-SCNC: <1 MMOL/L (ref 3–14)
AST SERPL W P-5'-P-CCNC: 12 U/L (ref 0–45)
BILIRUB SERPL-MCNC: 0.5 MG/DL (ref 0.2–1.3)
BUN SERPL-MCNC: 16 MG/DL (ref 7–30)
CALCIUM SERPL-MCNC: 9.5 MG/DL (ref 8.5–10.1)
CHLORIDE BLD-SCNC: 111 MMOL/L (ref 94–109)
CHOLEST SERPL-MCNC: 257 MG/DL
CO2 SERPL-SCNC: 30 MMOL/L (ref 20–32)
CREAT SERPL-MCNC: 0.92 MG/DL (ref 0.52–1.04)
FASTING STATUS PATIENT QL REPORTED: YES
GFR SERPL CREATININE-BSD FRML MDRD: 67 ML/MIN/1.73M2
GLUCOSE BLD-MCNC: 108 MG/DL (ref 70–99)
HDLC SERPL-MCNC: 71 MG/DL
LDLC SERPL CALC-MCNC: 152 MG/DL
NONHDLC SERPL-MCNC: 186 MG/DL
POTASSIUM BLD-SCNC: 5.1 MMOL/L (ref 3.4–5.3)
PROT SERPL-MCNC: 7 G/DL (ref 6.8–8.8)
SODIUM SERPL-SCNC: 140 MMOL/L (ref 133–144)
TRIGL SERPL-MCNC: 170 MG/DL

## 2022-11-15 PROCEDURE — 80053 COMPREHEN METABOLIC PANEL: CPT | Performed by: PHYSICIAN ASSISTANT

## 2022-11-15 PROCEDURE — 80061 LIPID PANEL: CPT | Performed by: PHYSICIAN ASSISTANT

## 2022-11-15 PROCEDURE — G0438 PPPS, INITIAL VISIT: HCPCS | Performed by: PHYSICIAN ASSISTANT

## 2022-11-15 PROCEDURE — 36415 COLL VENOUS BLD VENIPUNCTURE: CPT | Performed by: PHYSICIAN ASSISTANT

## 2022-11-15 PROCEDURE — 99213 OFFICE O/P EST LOW 20 MIN: CPT | Mod: 25 | Performed by: PHYSICIAN ASSISTANT

## 2022-11-15 RX ORDER — CARBOXYMETHYLCELLULOSE SODIUM AND GLYCERIN 10; 9 MG/ML; MG/ML
SOLUTION/ DROPS OPHTHALMIC
COMMUNITY

## 2022-11-15 ASSESSMENT — ENCOUNTER SYMPTOMS
CONSTIPATION: 0
NAUSEA: 0
SORE THROAT: 0
FREQUENCY: 0
PALPITATIONS: 0
COUGH: 0
HEMATOCHEZIA: 0
ABDOMINAL PAIN: 0
SHORTNESS OF BREATH: 0
NERVOUS/ANXIOUS: 0
HEADACHES: 0
DIARRHEA: 0
MYALGIAS: 0
DIZZINESS: 0
BREAST MASS: 0
FEVER: 0
EYE PAIN: 0
DYSURIA: 0
CHILLS: 0
ARTHRALGIAS: 0
WEAKNESS: 0
HEMATURIA: 0
HEARTBURN: 0
JOINT SWELLING: 0
PARESTHESIAS: 0

## 2022-11-15 ASSESSMENT — ACTIVITIES OF DAILY LIVING (ADL): CURRENT_FUNCTION: NO ASSISTANCE NEEDED

## 2022-11-15 ASSESSMENT — PAIN SCALES - GENERAL: PAINLEVEL: NO PAIN (0)

## 2022-11-15 NOTE — PROGRESS NOTES
"SUBJECTIVE:   Tamara is a 67 year old who presents for Preventive Visit.      Patient has been advised of split billing requirements and indicates understanding: Yes  Are you in the first 12 months of your Medicare coverage?  No    Healthy Habits:     In general, how would you rate your overall health?  Good    Frequency of exercise:  4-5 days/week    Duration of exercise:  45-60 minutes    Do you usually eat at least 4 servings of fruit and vegetables a day, include whole grains    & fiber and avoid regularly eating high fat or \"junk\" foods?  Yes    Taking medications regularly:  Yes    Medication side effects:  Not applicable    Ability to successfully perform activities of daily living:  No assistance needed    Home Safety:  No safety concerns identified    Hearing Impairment:  Difficulty following a conversation in a noisy restaurant or crowded room and need to ask people to speak up or repeat themselves    In the past 6 months, have you been bothered by leaking of urine?  No    In general, how would you rate your overall mental or emotional health?  Excellent      PHQ-2 Total Score: 0    Additional concerns today:  No  history of chronic pancreatitis. Denies profound epigastric abd pain. No chronic diarrhea.    Do you feel safe in your environment? Yes    Have you ever done Advance Care Planning? (For example, a Health Directive, POLST, or a discussion with a medical provider or your loved ones about your wishes): Yes, patient states has an Advance Care Planning document and will bring a copy to the clinic.       Fall risk  Fallen 2 or more times in the past year?: No  Any fall with injury in the past year?: No    Cognitive Screening   1) Repeat 3 items (Leader, Season, Table)    2) Clock draw: NORMAL  3) 3 item recall: Recalls 3 objects  Results: 3 items recalled: COGNITIVE IMPAIRMENT LESS LIKELY    Mini-CogTM Copyright TRA Singh. Licensed by the author for use in [facility]; reprinted with " permission (christiano@[facility]). All rights reserved.      Do you have sleep apnea, excessive snoring or daytime drowsiness?: yes    Reviewed and updated as needed this visit by clinical staff                  Reviewed and updated as needed this visit by Provider                 Social History     Tobacco Use     Smoking status: Never     Smokeless tobacco: Never   Substance Use Topics     Alcohol use: Yes     Comment: occasional         Alcohol Use 5/3/2021   Prescreen: >3 drinks/day or >7 drinks/week? Not Applicable   Prescreen: >3 drinks/day or >7 drinks/week? -         Current providers sharing in care for this patient include:   Patient Care Team:  Leidy Nguyen NP as PCP - General (Nurse Practitioner - Family)  Gopi Dee PA-C as Assigned PCP    The following health maintenance items are reviewed in Epic and correct as of today:  Health Maintenance   Topic Date Due     ZOSTER IMMUNIZATION (1 of 2) Never done     Pneumococcal Vaccine: 65+ Years (2 - PCV) 09/25/2021     PHQ-2 (once per calendar year)  01/01/2022     MEDICARE ANNUAL WELLNESS VISIT  05/04/2022     ANNUAL REVIEW OF HM ORDERS  05/04/2022     FALL RISK ASSESSMENT  05/04/2022     COVID-19 Vaccine (5 - Booster for Pfizer series) 09/19/2022     COLORECTAL CANCER SCREENING  11/14/2022     MAMMO SCREENING  11/16/2023     LIPID  05/04/2026     ADVANCE CARE PLANNING  05/04/2026     DEXA  09/09/2029     DTAP/TDAP/TD IMMUNIZATION (3 - Td or Tdap) 09/27/2029     HEPATITIS C SCREENING  Completed     INFLUENZA VACCINE  Completed     IPV IMMUNIZATION  Aged Out     MENINGITIS IMMUNIZATION  Aged Out     Lab work is in process  Labs reviewed in EPIC  BP Readings from Last 3 Encounters:   11/15/22 130/76   05/04/21 107/76   06/23/20 (!) 157/95    Wt Readings from Last 3 Encounters:   11/15/22 79.7 kg (175 lb 9.6 oz)   05/04/21 74.1 kg (163 lb 6.4 oz)   12/23/19 77.2 kg (170 lb 3.2 oz)                  Patient Active Problem List   Diagnosis      CARDIOVASCULAR SCREENING; LDL GOAL LESS THAN 160     Radial styloid tenosynovitis     CTS (carpal tunnel syndrome)     Atrophic vaginitis     Gallstones     Abnormal biliary HIDA scan     S/P laparoscopic cholecystectomy     Chronic pancreatitis, unspecified pancreatitis type (H)     Past Surgical History:   Procedure Laterality Date     BIOPSY       CHOLECYSTECTOMY       COLONOSCOPY       COLONOSCOPY WITH CO2 INSUFFLATION N/A 10/13/2014    Procedure: COLONOSCOPY WITH CO2 INSUFFLATION;  Surgeon: Jamal Beach MD;  Location: [facility]     COLONOSCOPY WITH CO2 INSUFFLATION N/A 11/14/2017    Procedure: COLONOSCOPY WITH CO2 INSUFFLATION;  COLON-RECTAL BLEEDING / BARUSYA;  Surgeon: Henok Jewell MD;  Location: [facility]     COMBINED ESOPHAGOSCOPY, GASTROSCOPY, DUODENOSCOPY (EGD) WITH CO2 INSUFFLATION N/A 7/25/2019    Procedure: ESOPHAGOGASTRODUODENOSCOPY, WITH CO2 INSUFFLATION;  Surgeon: Jamal Beach MD;  Location: [facility]     ESOPHAGOSCOPY, GASTROSCOPY, DUODENOSCOPY (EGD), COMBINED N/A 7/25/2019    Procedure: Esophagogastroduodenoscopy, With Biopsy;  Surgeon: Jamal Beach MD;  Location: [facility]     ESOPHAGOSCOPY, GASTROSCOPY, DUODENOSCOPY (EGD), COMBINED N/A 9/18/2019    Procedure: ESOPHAGOGASTRODUODENOSCOPY, WITH ENDOSCOPIC US;  Surgeon: Ric Jaeger MD;  Location: [facility]     EYE SURGERY       TUBAL LIGATION         Social History     Tobacco Use     Smoking status: Never     Smokeless tobacco: Never   Substance Use Topics     Alcohol use: Yes     Comment: occasional     Family History   Problem Relation Age of Onset     Cancer Mother         kidney     Diabetes Mother      Heart Disease Mother      Lipids Mother      Hypertension Mother      Colon Polyps Mother          benign     Hyperlipidemia Mother      Other Cancer Mother         Kidney     Osteoporosis Mother      Obesity Mother      Arthritis Father      Lipids Father      Hypertension Father      Osteoporosis Father       Hyperlipidemia Father      Prostate Cancer Father      Anxiety Disorder Father      Mental Illness Father      Arthritis Sister      Neurologic Disorder Sister         seizures     Obesity Sister      Heart Disease Sister      Cancer Brother         pancreatic     Coronary Artery Disease Brother      Obesity Brother      Other Cancer Brother         Pancreas     Thyroid Disease Son      Obesity Sister      Breast Cancer No family hx of          Current Outpatient Medications   Medication Sig Dispense Refill     Carboxymethylcellul-Glycerin (REFRESH OPTIVE) 1-0.9 % GEL        cetirizine (ZYRTEC) 10 MG tablet Take 10 mg by mouth daily as needed for allergies       Olopatadine HCl (PATADAY OP)        Polyethyl Glycol-Propyl Glycol (SYSTANE HYDRATION PF OP)        No Known Allergies  Recent Labs   Lab Test 05/04/21  1034 12/23/19  0913 07/16/19  1015 04/09/19  1635 10/27/17  0826 10/13/17  1554   * 140*  --   --  151*  --    HDL 68 60  --   --  70  --    TRIG 81 150*  --   --  185*  --    ALT 25  --  39 27  --   --    CR 1.01  --   --   --   --  0.83   GFRESTIMATED 57*  --   --   --   --  69   GFRESTBLACK 66  --   --   --   --  84   POTASSIUM 4.6  --   --   --   --  4.0   TSH  --  1.60  --   --   --  1.95          Breast CA Risk Assessment (FHS-7) 5/3/2021   Do you have a family history of breast, colon, or ovarian cancer? No / Unknown       click delete button to remove this line now  Mammogram Screening: Recommended mammography every 1-2 years with patient discussion and risk factor consideration  Pertinent mammograms are reviewed under the imaging tab.    Review of Systems   Constitutional: Negative for chills and fever.   HENT: Positive for hearing loss. Negative for congestion, ear pain and sore throat.    Eyes: Negative for pain and visual disturbance.   Respiratory: Negative for cough and shortness of breath.    Cardiovascular: Negative for palpitations and peripheral edema.   Gastrointestinal:  "Negative for abdominal pain, constipation, diarrhea, heartburn, hematochezia and nausea.   Breasts:  Negative for tenderness, breast mass and discharge.   Genitourinary: Negative for dysuria, frequency, genital sores, hematuria, pelvic pain, urgency, vaginal bleeding and vaginal discharge.   Musculoskeletal: Negative for arthralgias, joint swelling and myalgias.   Skin: Negative for rash.   Neurological: Negative for dizziness, weakness, headaches and paresthesias.   Psychiatric/Behavioral: Negative for mood changes. The patient is not nervous/anxious.      Constitutional, HEENT, cardiovascular, pulmonary, GI, , musculoskeletal, neuro, skin, endocrine and psych systems are negative, except as otherwise noted.    OBJECTIVE:   There were no vitals taken for this visit. Estimated body mass index is 26.78 kg/m  as calculated from the following:    Height as of 5/4/21: 1.664 m (5' 5.5\").    Weight as of 5/4/21: 74.1 kg (163 lb 6.4 oz).  Physical Exam  GENERAL APPEARANCE: healthy, alert and no distress  EYES: Eyes grossly normal to inspection, PERRL and conjunctivae and sclerae normal  HENT: ear canals and TM's normal, nose and mouth without ulcers or lesions, oropharynx clear and oral mucous membranes moist  NECK: no adenopathy, no asymmetry, masses, or scars and thyroid normal to palpation  RESP: lungs clear to auscultation - no rales, rhonchi or wheezes  BREAST: normal without masses, tenderness or nipple discharge and no palpable axillary masses or adenopathy  CV: regular rate and rhythm, normal S1 S2, no S3 or S4, no murmur, click or rub, no peripheral edema and peripheral pulses strong  ABDOMEN: soft, nontender, no hepatosplenomegaly, no masses and bowel sounds normal  MS: no musculoskeletal defects are noted and gait is age appropriate without ataxia  SKIN: no suspicious lesions or rashes  NEURO: Normal strength and tone, sensory exam grossly normal, mentation intact and speech normal  PSYCH: mentation appears " "normal and affect normal/bright    Diagnostic Test Results:  Labs reviewed in Epic    ASSESSMENT / PLAN:       ICD-10-CM    1. Encounter for Medicare annual wellness exam  Z00.00       2. Screen for colon cancer  Z12.11       3. Chronic pancreatitis, unspecified pancreatitis type (H)  K86.1 Comprehensive metabolic panel (BMP + Alb, Alk Phos, ALT, AST, Total. Bili, TP)      4. Lipid screening  Z13.220 Lipid panel reflex to direct LDL Fasting      5. Colon cancer screening  Z12.11 Colonoscopy Screening  Referral      6. Internal hemorrhoids  K64.8 Adult Colorectal Surgery  Referral        work on lifestyle modification    Patient has been advised of split billing requirements and indicates understanding: Yes      COUNSELING:  Reviewed preventive health counseling, as reflected in patient instructions       Regular exercise       Healthy diet/nutrition    Estimated body mass index is 26.78 kg/m  as calculated from the following:    Height as of 5/4/21: 1.664 m (5' 5.5\").    Weight as of 5/4/21: 74.1 kg (163 lb 6.4 oz).    Weight management plan: Discussed healthy diet and exercise guidelines    She reports that she has never smoked. She has never used smokeless tobacco.      Appropriate preventive services were discussed with this patient, including applicable screening as appropriate for cardiovascular disease, diabetes, osteopenia/osteoporosis, and glaucoma.  As appropriate for age/gender, discussed screening for colorectal cancer, prostate cancer, breast cancer, and cervical cancer. Checklist reviewing preventive services available has been given to the patient.    Reviewed patients plan of care and provided an AVS. The Basic Care Plan (routine screening as documented in Health Maintenance) for Jena meets the Care Plan requirement. This Care Plan has been established and reviewed with the Patient.    Counseling Resources:  ATP IV Guidelines  Pooled Cohorts Equation Calculator  Breast Cancer Risk " Calculator  Breast Cancer: Medication to Reduce Risk  FRAX Risk Assessment  ICSI Preventive Guidelines  Dietary Guidelines for Americans, 2010  [employer]'s MyPlate  ASA Prophylaxis  Lung CA Screening    OCTAVIA [facility] TIA    Identified Health Risks:

## 2022-11-15 NOTE — PATIENT INSTRUCTIONS
Patient Education   Personalized Prevention Plan  You are due for the preventive services outlined below.  Your care team is available to assist you in scheduling these services.  If you have already completed any of these items, please share that information with your care team to update in your medical record.  Health Maintenance Due   Topic Date Due     Zoster (Shingles) Vaccine (1 of 2) Never done     Pneumococcal Vaccine (2 - PCV) 09/25/2021     PHQ-2 (once per calendar year)  01/01/2022     Annual Wellness Visit  05/04/2022     ANNUAL REVIEW OF HM ORDERS  05/04/2022     FALL RISK ASSESSMENT  05/04/2022     COVID-19 Vaccine (5 - Booster for Pfizer series) 09/19/2022     Colorectal Cancer Screening  11/14/2022

## 2022-11-16 NOTE — TELEPHONE ENCOUNTER
Diagnosis, Referred by & from: Hemorrhoids   Appt date: 2023   NOTES STATUS DETAILS   OFFICE NOTE from referring provider [facility] [place] [facility]:  11/15/22, 21 - PCC OV with PABLO Marley   OFFICE NOTE from other specialist [facility] [place] [facility]:  10/15/19, 19 - GEN SURG OV with Dr. Beach    [facility] - M/15/19 - GI OV with Dr. Munroe   DISCHARGE SUMMARY from hospital N/A    DISCHARGE REPORT from the ER N/A    OPERATIVE REPORT [facility]:  10/2/19 - OP Note for LAPAROSCOPIC CHOLECYSTECTOMY with Dr. Beach   MEDICATION LIST Internal    LABS     BIOPSIES/PATHOLOGY RELATED TO DIAGNOSIS Internal [facility]:  10/13/14 - Colon Biopsy (Case: [id number])   DIAGNOSTIC PROCEDURES     COLONOSCOPY [facility]:  ([facility]) 23 - Colonoscopy  17 - Colonoscopy  10/13/14 - Colonoscopy   UPPER ENDOSCOPY (EGD) Internal [facility]:  19 - EGD   IMAGING (DISC & REPORT)      CT [facility]:  19 - CT Abd/Pelvis   MRI [facility]:  21 - MRI Abdomen  19 - MRI Abdomen MRCP   ULTRASOUND  (ENDOANAL/ENDORECTAL) [facility]:  19 - US Abdomen

## 2022-11-25 ENCOUNTER — TELEPHONE (OUTPATIENT)
Dept: GASTROENTEROLOGY | Facility: CLINIC | Age: 67
End: 2022-11-25

## 2022-11-25 NOTE — TELEPHONE ENCOUNTER
Screening Questions  BLUE  KIND OF PREP RED  LOCATION [review exclusion criteria] GREEN  SEDATION TYPE        y Are you active on mychart?       Dee Ordering/Referring Provider?        [employer] What type of coverage do you have?      n Have you had a positive covid test in the last 14 days?     29.0 1. BMI  [BMI 40+ - review exclusion criteria]    y  2. Are you able to give consent for your medical care? [IF NO,RN REVIEW]        n 3. Are you taking any prescription pain medications on a routine schedule?        3a. EXTENDED PREP What kind of prescription?     n 4. Do you have any chemical dependencies such as alcohol, street drugs, or methadone?    n 5. Do you have any history of post-traumatic stress syndrome, severe anxiety or history of psychosis?      **If yes 3- 5 , please schedule with MAC sedation.**          IF YES TO ANY 6 - 10 - HOSPITAL SETTING ONLY.     n 6.   Do you need assistance transferring?     n 7.   Have you had a heart or lung transplant?    n 8.   Are you currently on dialysis?   n 9.   Do you use daily home oxygen?   n 10. Do you take nitroglycerin?   10a.  If yes, how often?     11. [FEMALES]  n Are you currently pregnant?    11a.  If yes, how many weeks? [ Greater than 12 weeks, OR NEEDED]    n 12. Do you have Pulmonary Hypertension? *NEED PAC APPT AT [facility]*     n 13. [review exclusion criteria]  Do you have any implantable devices in your body (pacemaker, defib, LVAD)?    n 14. In the past 6 months, have you had any heart related issues including cardiomyopathy or heart attack?     14a.  If yes, did it require cardiac stenting if so when?     n 15. Have you had a stroke or Transient ischemic attack (TIA - aka  mini stroke ) within 6 months?      n 16. Do you have mod to severe Obstructive Sleep Apnea?  [Hospital only - Ok at [place]]    n 17. Do you have SEVERE AND UNCONTROLLED asthma? *NEED PAC APPT AT [facility]*     n 18. Are you currently taking any blood thinners?     18a. If yes,  "inform patient to \"follow up w/ ordering provider for bridging instructions.\"    n 19. Do you take the medication Phentermine?    19a. If yes, \"Hold for 7 days before procedure.  Please consult your prescribing provider if you have questions about holding this medication.\"     n  20. Do you have chronic kidney disease?      n  21. Do you have a diagnosis of diabetes?     n  22. On a regular basis do you go 3-5 days between bowel movements?      23. Preferred LOCAL Pharmacy for Pre Prescription    [ LIST ONLY ONE PHARMACY]          [facility] [id number] [place] [address]        - CLOSING REMINDERS -    Informed patient they will need an adult    Cannot take any type of public or medical transportation alone    Conscious Sedation- Needs  for 6 hours after the procedure       MAC/General-Needs  for 24 hours after procedure    Pre-Procedure Covid test to be completed [[facility] PCR Testing Required]    Confirmed Nurse will call to complete assessment       - SCHEDULING DETAILS -     Long  Surgeon    12/23/22  Date of Procedure  Lower Endoscopy [Colonoscopy]  Type of Procedure Scheduled  [facility]-If you answer yes to questions #8, #20, #21Which Colonoscopy Prep was Sent?     CS Sedation Type     n PAC / Pre-op Required         Additional comments:            "

## 2022-11-30 ENCOUNTER — ANCILLARY PROCEDURE (OUTPATIENT)
Dept: MAMMOGRAPHY | Facility: CLINIC | Age: 67
End: 2022-11-30
Attending: PHYSICIAN ASSISTANT
Payer: COMMERCIAL

## 2022-11-30 ENCOUNTER — TELEPHONE (OUTPATIENT)
Dept: GASTROENTEROLOGY | Facility: CLINIC | Age: 67
End: 2022-11-30

## 2022-11-30 DIAGNOSIS — Z12.31 VISIT FOR SCREENING MAMMOGRAM: ICD-10-CM

## 2022-11-30 PROCEDURE — 77067 SCR MAMMO BI INCL CAD: CPT | Mod: GC | Performed by: RADIOLOGY

## 2022-11-30 PROCEDURE — 77063 BREAST TOMOSYNTHESIS BI: CPT | Mod: GC | Performed by: RADIOLOGY

## 2022-11-30 NOTE — TELEPHONE ENCOUNTER
Caller: Jena Ibarra      Procedure: colon    Date, Location, and Surgeon of Procedure Cancelled: 12/23,[facility]        Reason for cancel (please be detailed, any staff messages or encounters to note?): Provider Out        Rescheduled: n  Left Vm for patient to call and reschedule procedure @ ,Letter sent.

## 2022-11-30 NOTE — TELEPHONE ENCOUNTER
The patient returned the call to reschedule her canceled 12/23 colonoscopy and expressed interest in scheduling at the [place] location due to close proximity.   provided their number & transferred the patient.  She will call central scheduling back if [place] is booking too far out.

## 2022-12-01 ENCOUNTER — TELEPHONE (OUTPATIENT)
Dept: GASTROENTEROLOGY | Facility: CLINIC | Age: 67
End: 2022-12-01

## 2022-12-01 NOTE — TELEPHONE ENCOUNTER
Caller: Tamara    Procedure: Colon    Date, Location, and Surgeon of Procedure Cancelled: 12/23/22 [facility]-previously canceled    Ordering Provider:Luiz    Reason for cancel (please be detailed, any staff messages or encounters to note?): Provider not available        Rescheduled: Yes     If rescheduled:    Date: 1/27/23   Location:    Prep Resent: No changes (changes to prep?)   Covid Test Rescheduled: NO   Note any change or update to original order/sedation: [facility]

## 2023-01-04 ENCOUNTER — TELEPHONE (OUTPATIENT)
Dept: GASTROENTEROLOGY | Facility: CLINIC | Age: 68
End: 2023-01-04

## 2023-01-04 NOTE — TELEPHONE ENCOUNTER
Caller: Rosendo  Reason for Reschedule/Cancellation (please be detailed, any staff messages or encounters to note?): provider not available      Prior to reschedule please review:    Ordering Provider:Luiz    Sedation per order:moderate    Does patient have any ASC Exclusions, please identify?: n      Notes on Cancelled Procedure:    Procedure:Lower Endoscopy [Colonoscopy]     Date: 1/27/23    Location:[facility]; [address] [place] [address]    Surgeon: Bryant        Rescheduled: Yes    Procedure: Lower Endoscopy [Colonoscopy]     Date: 1/31/23    Location:[facility]; [address] [place] [address]    Surgeon: Jayson    Sedation Level Scheduled  mac,  Reason for Sedation Level on block for schedules    Prep/Instructions updated and sent: alfred

## 2023-01-30 NOTE — H&P
[facility] History and Physical    Jena Ibarra MRN# [id number]   Age: 67 year old YOB: 1955     Date of Admission:  (Not on file)    Home clinic: [facility]  Primary care provider: Leidy Nguyen          Impression and Plan:   Impression:   Colon cancer screening [Z12.11]  History of polyps  Last colonoscopy 2017-normal      Plan:   Proceed to Colonoscopy as planned.  The procedure, risks(bleeding, perforation), benefits and alternatives were discussed and the patient agrees to proceed. Cleared for Anesthesia             Chief Complaint:   Colon cancer screening [Z12.11]    History is obtained from the patient          History of Present Illness:   This 67 year old female is being seen at this time for evaluation for colonoscopy.  No complaints or family hx           Past Medical History:     Past Medical History:   Diagnosis Date     Diverticulosis      History of colon polyps      Hyperlipidemia LDL goal < 160      Osteopenia             Past Surgical History:     Past Surgical History:   Procedure Laterality Date     BIOPSY       CHOLECYSTECTOMY       COLONOSCOPY       COLONOSCOPY WITH CO2 INSUFFLATION N/A 10/13/2014    Procedure: COLONOSCOPY WITH CO2 INSUFFLATION;  Surgeon: Jamal Beach MD;  Location: [facility]     COLONOSCOPY WITH CO2 INSUFFLATION N/A 11/14/2017    Procedure: COLONOSCOPY WITH CO2 INSUFFLATION;  COLON-RECTAL BLEEDING / BARUSYA;  Surgeon: Henok Jewell MD;  Location: [facility]     COMBINED ESOPHAGOSCOPY, GASTROSCOPY, DUODENOSCOPY (EGD) WITH CO2 INSUFFLATION N/A 7/25/2019    Procedure: ESOPHAGOGASTRODUODENOSCOPY, WITH CO2 INSUFFLATION;  Surgeon: Jamal Beach MD;  Location: [facility]     ESOPHAGOSCOPY, GASTROSCOPY, DUODENOSCOPY (EGD), COMBINED N/A 7/25/2019    Procedure: Esophagogastroduodenoscopy, With Biopsy;  Surgeon: Jamal Beach MD;  [facility]: [facility]     ESOPHAGOSCOPY, GASTROSCOPY, DUODENOSCOPY (EGD),  COMBINED N/A 9/18/2019    Procedure: ESOPHAGOGASTRODUODENOSCOPY, WITH ENDOSCOPIC US;  Surgeon: Ric Jaeger MD;  Location: [facility]     EYE SURGERY       TUBAL LIGATION              Social History:     Social History     Tobacco Use     Smoking status: Never     Smokeless tobacco: Never   Substance Use Topics     Alcohol use: Yes     Comment: occasional            Family History:     Family History   Problem Relation Age of Onset     Cancer Mother         kidney     Diabetes Mother      Heart Disease Mother      Lipids Mother      Hypertension Mother      Colon Polyps Mother          benign     Hyperlipidemia Mother      Other Cancer Mother         Kidney     Osteoporosis Mother      Obesity Mother      Arthritis Father      Lipids Father      Hypertension Father      Osteoporosis Father      Hyperlipidemia Father      Prostate Cancer Father      Anxiety Disorder Father      Mental Illness Father      Arthritis Sister      Neurologic Disorder Sister         seizures     Obesity Sister      Heart Disease Sister      Cancer Brother         pancreatic     Coronary Artery Disease Brother      Obesity Brother      Other Cancer Brother         Pancreas     Thyroid Disease Son      Obesity Sister      Breast Cancer No family hx of             Immunizations:     VACCINE/DOSE   Diptheria   DPT   DTAP   HBIG   Hepatitis A   Hepatitis B   HIB   Influenza   Measles   Meningococcal   MMR   Mumps   Pneumococcal   Polio   Rubella   Small Pox   TDAP   Varicella   Zoster            Allergies:   No Known Allergies         Medications:     No current facility-administered medications for this encounter.     Current Outpatient Medications   Medication Sig     bisacodyl (DULCOLAX) 5 MG EC tablet Take 2 tablets at 3 pm the day before your procedure. If your procedure is before 11 am, take 2 additional tablets at 11 pm. If your procedure is after 11 am, take 2 additional tablets at 6 am. For additional instructions refer to  your colonoscopy prep instructions.     Carboxymethylcellul-Glycerin (REFRESH OPTIVE) 1-0.9 % GEL      cetirizine (ZYRTEC) 10 MG tablet Take 10 mg by mouth daily as needed for allergies     Olopatadine HCl (PATADAY OP)      Polyethyl Glycol-Propyl Glycol (SYSTANE HYDRATION PF OP)      polyethylene glycol (GOLYTELY) 236 g suspension The night before the exam at 6 pm drink an 8-ounce glass every 15 minutes until the jug is half empty. If you arrive before 11 AM: Drink the other half of the Golytely jug at 11 PM night before procedure. If you arrive after 11 AM: Drink the other half of the Golytely jug at 6 AM day of procedure. For additional instructions refer to your colonoscopy prep instructions.             Review of Systems:   The review of systems was positive for the following findings.  None.  The remainder of the review of systems was unremarkable.          Physical Exam:   All vitals have been reviewed  not currently breastfeeding.  No intake or output data in the 24 hours ending 01/30/23 1211  SHEENT examination revealed NC/AT, EOMI.  Examination of the chest revealed CTA.  Examination of the heart revealed RRR.  Examination of the abdomen revealed soft, non tender.  The neuromuscular examination was None.          Data:   All laboratory data reviewed  No results found for any visits on 01/31/23.  -     Oscar Tyler MD, FACS

## 2023-01-31 ENCOUNTER — HOSPITAL ENCOUNTER (OUTPATIENT)
Facility: CLINIC | Age: 68
Discharge: HOME | End: 2023-01-31
Attending: SPECIALIST | Admitting: SPECIALIST
Payer: COMMERCIAL

## 2023-01-31 ENCOUNTER — ANESTHESIA (OUTPATIENT)
Dept: GASTROENTEROLOGY | Facility: CLINIC | Age: 68
End: 2023-01-31
Payer: COMMERCIAL

## 2023-01-31 ENCOUNTER — ANESTHESIA EVENT (OUTPATIENT)
Dept: GASTROENTEROLOGY | Facility: CLINIC | Age: 68
End: 2023-01-31
Payer: COMMERCIAL

## 2023-01-31 VITALS
OXYGEN SATURATION: 100 % | HEART RATE: 66 BPM | SYSTOLIC BLOOD PRESSURE: 135 MMHG | WEIGHT: 170 LBS | TEMPERATURE: 97.3 F | BODY MASS INDEX: 28.32 KG/M2 | RESPIRATION RATE: 16 BRPM | DIASTOLIC BLOOD PRESSURE: 83 MMHG | HEIGHT: 65 IN

## 2023-01-31 LAB — COLONOSCOPY: NORMAL

## 2023-01-31 PROCEDURE — G0105 COLORECTAL SCRN; HI RISK IND: HCPCS | Performed by: SPECIALIST

## 2023-01-31 PROCEDURE — 45378 DIAGNOSTIC COLONOSCOPY: CPT | Performed by: SPECIALIST

## 2023-01-31 PROCEDURE — 258N000003 HC RX IP 258 OP 636: Performed by: NURSE ANESTHETIST, CERTIFIED REGISTERED

## 2023-01-31 PROCEDURE — 250N000011 HC RX IP 250 OP 636: Performed by: NURSE ANESTHETIST, CERTIFIED REGISTERED

## 2023-01-31 PROCEDURE — 370N000017 HC ANESTHESIA TECHNICAL FEE, PER MIN: Performed by: SPECIALIST

## 2023-01-31 PROCEDURE — 250N000009 HC RX 250: Performed by: NURSE ANESTHETIST, CERTIFIED REGISTERED

## 2023-01-31 RX ORDER — LIDOCAINE HYDROCHLORIDE 20 MG/ML
INJECTION, SOLUTION INFILTRATION; PERINEURAL PRN
Status: DISCONTINUED | OUTPATIENT
Start: 2023-01-31 | End: 2023-01-31

## 2023-01-31 RX ORDER — PROPOFOL 10 MG/ML
INJECTION, EMULSION INTRAVENOUS CONTINUOUS PRN
Status: DISCONTINUED | OUTPATIENT
Start: 2023-01-31 | End: 2023-01-31

## 2023-01-31 RX ORDER — LIDOCAINE 40 MG/G
CREAM TOPICAL
Status: DISCONTINUED | OUTPATIENT
Start: 2023-01-31 | End: 2023-01-31 | Stop reason: HOSPADM

## 2023-01-31 RX ORDER — SODIUM CHLORIDE, SODIUM LACTATE, POTASSIUM CHLORIDE, CALCIUM CHLORIDE 600; 310; 30; 20 MG/100ML; MG/100ML; MG/100ML; MG/100ML
INJECTION, SOLUTION INTRAVENOUS CONTINUOUS
Status: DISCONTINUED | OUTPATIENT
Start: 2023-01-31 | End: 2023-01-31 | Stop reason: HOSPADM

## 2023-01-31 RX ORDER — PROPOFOL 10 MG/ML
INJECTION, EMULSION INTRAVENOUS PRN
Status: DISCONTINUED | OUTPATIENT
Start: 2023-01-31 | End: 2023-01-31

## 2023-01-31 RX ADMIN — PROPOFOL 50 MG: 10 INJECTION, EMULSION INTRAVENOUS at 14:06

## 2023-01-31 RX ADMIN — PROPOFOL 200 MCG/KG/MIN: 10 INJECTION, EMULSION INTRAVENOUS at 14:06

## 2023-01-31 RX ADMIN — LIDOCAINE HYDROCHLORIDE 5 MG: 20 INJECTION, SOLUTION INFILTRATION; PERINEURAL at 14:04

## 2023-01-31 RX ADMIN — PROPOFOL 50 MG: 10 INJECTION, EMULSION INTRAVENOUS at 14:05

## 2023-01-31 RX ADMIN — SODIUM CHLORIDE, POTASSIUM CHLORIDE, SODIUM LACTATE AND CALCIUM CHLORIDE: 600; 310; 30; 20 INJECTION, SOLUTION INTRAVENOUS at 13:59

## 2023-01-31 NOTE — ANESTHESIA POSTPROCEDURE EVALUATION
Patient: Jena Ibarra    Procedure: Procedure(s):  COLONOSCOPY       Anesthesia Type:  MAC    Note:  Disposition: Outpatient   Postop Pain Control: Uneventful            Sign Out: Well controlled pain   PONV: No   Neuro/Psych: Uneventful            Sign Out: Acceptable/Baseline neuro status   Airway/Respiratory: Uneventful            Sign Out: Acceptable/Baseline resp. status   CV/Hemodynamics: Uneventful            Sign Out: Acceptable CV status   Other NRE: NONE   DID A NON-ROUTINE EVENT OCCUR? No    Event details/Postop Comments:  Pt was happy with anesthesia care.  No complications.  I will follow up with the pt if needed.           Last vitals:  Vitals Value Taken Time   /83 01/31/23 1445   Temp     Pulse 66 01/31/23 1445   Resp     SpO2 100 % 01/31/23 1446   Vitals shown include unvalidated device data.    Electronically Signed By: DEANDRE Small CRNA  January 31, 2023  3:12 PM

## 2023-01-31 NOTE — ANESTHESIA PREPROCEDURE EVALUATION
Anesthesia Pre-Procedure Evaluation    Patient: Jena Ibarra   MRN: [id number] : 1955        Procedure : Procedure(s):  COLONOSCOPY          Past Medical History:   Diagnosis Date     Diverticulosis      History of colon polyps      Hyperlipidemia LDL goal < 160      Osteopenia       Past Surgical History:   Procedure Laterality Date     BIOPSY       CHOLECYSTECTOMY       COLONOSCOPY       COLONOSCOPY WITH CO2 INSUFFLATION N/A 10/13/2014    Procedure: COLONOSCOPY WITH CO2 INSUFFLATION;  Surgeon: Jamal Beach MD;  [facility]: [facility]     COLONOSCOPY WITH CO2 INSUFFLATION N/A 2017    Procedure: COLONOSCOPY WITH CO2 INSUFFLATION;  COLON-RECTAL BLEEDING / BARUSYA;  Surgeon: Henok Jewell MD;  [facility]: [facility]     COMBINED ESOPHAGOSCOPY, GASTROSCOPY, DUODENOSCOPY (EGD) WITH CO2 INSUFFLATION N/A 2019    Procedure: ESOPHAGOGASTRODUODENOSCOPY, WITH CO2 INSUFFLATION;  Surgeon: Jamal Beach MD;  [facility]     ESOPHAGOSCOPY, GASTROSCOPY, DUODENOSCOPY (EGD), COMBINED N/A 2019    Procedure: Esophagogastroduodenoscopy, With Biopsy;  Surgeon: Jamal Beach MD;  [facility]     ESOPHAGOSCOPY, GASTROSCOPY, DUODENOSCOPY (EGD), COMBINED N/A 2019    Procedure: ESOPHAGOGASTRODUODENOSCOPY, WITH ENDOSCOPIC US;  Surgeon: Ric Jaeger MD;  Location: [facility]     EYE SURGERY       TUBAL LIGATION        No Known Allergies   Social History     Tobacco Use     Smoking status: Never     Smokeless tobacco: Never   Substance Use Topics     Alcohol use: Yes     Comment: occasional      Wt Readings from Last 1 Encounters:   11/15/22 79.7 kg (175 lb 9.6 oz)        Anesthesia Evaluation   Pt has had prior anesthetic. Type: MAC and General.    No history of anesthetic complications       ROS/MED HX  ENT/Pulmonary:  - neg pulmonary ROS     Neurologic:  - neg neurologic ROS     Cardiovascular:     (+) Dyslipidemia -----    METS/Exercise Tolerance: >4 METS     Hematologic:  - neg hematologic  ROS     Musculoskeletal:  - neg musculoskeletal ROS     GI/Hepatic: Comment: Chronic pancreatitis      Renal/Genitourinary:  - neg Renal ROS     Endo:  - neg endo ROS     Psychiatric/Substance Use:  - neg psychiatric ROS     Infectious Disease:  - neg infectious disease ROS     Malignancy:  - neg malignancy ROS     Other:  - neg other ROS          Physical Exam    Airway  airway exam normal      Mallampati: II   TM distance: > 3 FB   Neck ROM: full   Mouth opening: > 3 cm    Respiratory Devices and Support         Dental           Cardiovascular   cardiovascular exam normal       Rhythm and rate: regular and normal     Pulmonary   pulmonary exam normal        breath sounds clear to auscultation           OUTSIDE LABS:  CBC:   Lab Results   Component Value Date    WBC 6.6 04/09/2019    WBC 5.0 10/13/2017    HGB 13.6 04/09/2019    HGB 12.2 10/13/2017    HCT 41.1 04/09/2019    HCT 37.0 10/13/2017     04/09/2019     10/13/2017     BMP:   Lab Results   Component Value Date     11/15/2022     05/04/2021    POTASSIUM 5.1 11/15/2022    POTASSIUM 4.6 05/04/2021    CHLORIDE 111 (H) 11/15/2022    CHLORIDE 107 05/04/2021    CO2 30 11/15/2022    CO2 27 05/04/2021    BUN 16 11/15/2022    BUN 11 05/04/2021    CR 0.92 11/15/2022    CR 1.01 05/04/2021     (H) 11/15/2022    GLC 87 05/04/2021     COAGS: No results found for: PTT, INR, FIBR  POC: No results found for: BGM, HCG, HCGS  HEPATIC:   Lab Results   Component Value Date    ALBUMIN 4.0 11/15/2022    PROTTOTAL 7.0 11/15/2022    ALT 28 11/15/2022    AST 12 11/15/2022    ALKPHOS 66 11/15/2022    BILITOTAL 0.5 11/15/2022     OTHER:   Lab Results   Component Value Date    SALEEM 9.5 11/15/2022    LIPASE 40 (L) 05/04/2021    AMYLASE 18 (L) 05/04/2021    TSH 1.60 12/23/2019       Anesthesia Plan    ASA Status:  2   NPO Status:  NPO Appropriate    Anesthesia Type: MAC.     - Reason for MAC: Deep or markedly invasive  procedure (G8)   Induction: Intravenous.   Maintenance: TIVA.        Consents    Anesthesia Plan(s) and associated risks, benefits, and realistic alternatives discussed. Questions answered and patient/representative(s) expressed understanding.     - Discussed: Risks, Benefits and Alternatives for BOTH SEDATION and the PROCEDURE were discussed     - Discussed with:  Patient      - Extended Intubation/Ventilatory Support Discussed: No.      - Patient is DNR/DNI Status: No    Use of blood products discussed: No .     Postoperative Care    Pain management: Multi-modal analgesia.   PONV prophylaxis: Background Propofol Infusion     Comments:    Other Comments: The risks and benefits of anesthesia, and the alternatives where applicable, have been discussed with the patient, and they wish to proceed.            DEANDRE Small CRNA

## 2023-01-31 NOTE — ANESTHESIA CARE TRANSFER NOTE
Patient: Jena Ibarra    Procedure: Procedure(s):  COLONOSCOPY       Diagnosis: Colon cancer screening [Z12.11]  Diagnosis Additional Information: No value filed.    Anesthesia Type:   MAC     Note:    Oropharynx: oropharynx clear of all foreign objects and spontaneously breathing  Level of Consciousness: drowsy  Oxygen Supplementation: face mask    Independent Airway: airway patency satisfactory and stable  Dentition: dentition unchanged  Vital Signs Stable: post-procedure vital signs reviewed and stable  Report to RN Given: handoff report given  Patient transferred to: Phase II    Handoff Report: Identifed the Patient, Identified the Reponsible Provider, Reviewed the pertinent medical history, Discussed the surgical course, Reviewed Intra-OP anesthesia mangement and issues during anesthesia, Set expectations for post-procedure period and Allowed opportunity for questions and acknowledgement of understanding      Vitals:  Vitals Value Taken Time   /83 01/31/23 1445   Temp     Pulse 66 01/31/23 1445   Resp     SpO2 100 % 01/31/23 1446   Vitals shown include unvalidated device data.    Electronically Signed By: DEANDRE Small CRNA  January 31, 2023  3:12 PM

## 2023-01-31 NOTE — DISCHARGE INSTRUCTIONS
[facility]    Home Care Following Endoscopy          Activity:  You have just undergone an endoscopic procedure usually performed with conscious sedation.  Do not work or operate machinery (including a car) for at least 12 hours.    I encourage you to walk and attempt to pass this air as soon as possible.    Diet:  Return to the diet you were on before your procedure but eat lightly for the first 12-24 hours.  Drink plenty of water.  Resume any regular medications unless otherwise advised by your physician.  Please begin any new medication prescribed as a result of your procedure as directed by your physician.   If you had any biopsy or polyp removed please refrain from aspirin or aspirin products for 2 days.  If on Coumadin please restart as instructed by your physician.   Pain:  You may take Tylenol as needed for pain.  Expected Recovery:  You can expect some mild abdominal fullness and/or discomfort due to the air used to inflate your intestinal tract. It is also normal to have a mild sore throat after upper endoscopy.    Call Your Physician if You Have:  After Colonoscopy:  Worsening persisting abdominal pain which is worse with activity.  Fevers (>101 degrees F), chills or shakes.  Passage of continued blood with bowel movements.   Any questions or concerns about your recovery, please call [phone] or after hours [phone] Nurse Advice Line.    Follow-up Care:  You did not have polyps/biopsy tissue sample(s) removed.

## 2023-02-16 ENCOUNTER — MYC MEDICAL ADVICE (OUTPATIENT)
Dept: SURGERY | Facility: CLINIC | Age: 68
End: 2023-02-16
Payer: COMMERCIAL

## 2023-02-16 NOTE — CONFIDENTIAL NOTE
Sent upcoming appointment reminder via [employer].     Appt date/time: 2/23/2023 at 9:15 am  Provider: KRISTIAN Reeves  Appt type: New pt - Hemorrhoids    Kristina Nix CMA

## 2023-02-23 ENCOUNTER — PRE VISIT (OUTPATIENT)
Dept: SURGERY | Facility: CLINIC | Age: 68
End: 2023-02-23

## 2023-03-07 ASSESSMENT — ENCOUNTER SYMPTOMS
BLOATING: 1
JAUNDICE: 0
BOWEL INCONTINENCE: 0
CONSTIPATION: 1
BLOOD IN STOOL: 1
DIARRHEA: 1
VOMITING: 0
RECTAL PAIN: 0
ABDOMINAL PAIN: 0
HEARTBURN: 0
NAUSEA: 0

## 2023-03-08 ENCOUNTER — OFFICE VISIT (OUTPATIENT)
Dept: SURGERY | Facility: CLINIC | Age: 68
End: 2023-03-08
Payer: COMMERCIAL

## 2023-03-08 ENCOUNTER — TELEPHONE (OUTPATIENT)
Dept: SURGERY | Facility: CLINIC | Age: 68
End: 2023-03-08

## 2023-03-08 VITALS — SYSTOLIC BLOOD PRESSURE: 138 MMHG | DIASTOLIC BLOOD PRESSURE: 88 MMHG | HEART RATE: 87 BPM | OXYGEN SATURATION: 100 %

## 2023-03-08 DIAGNOSIS — K64.8 INTERNAL HEMORRHOID: Primary | ICD-10-CM

## 2023-03-08 PROCEDURE — 99203 OFFICE O/P NEW LOW 30 MIN: CPT

## 2023-03-08 ASSESSMENT — PAIN SCALES - GENERAL: PAINLEVEL: NO PAIN (0)

## 2023-03-08 NOTE — NURSING NOTE
Chief Complaint   Patient presents with     Hemorrhoids       Vitals:    03/08/23 1026   BP: 138/88   BP Location: Left arm   Patient Position: Sitting   Cuff Size: Adult Regular   Pulse: 87   SpO2: 100%       There is no height or weight on file to calculate BMI.    Gulshan Ho EMT-P

## 2023-03-08 NOTE — PROGRESS NOTES
Colon and Rectal Surgery Consult Clinic Note    Date: 3/8/2023     Referring provider:  Gopi Dee PA-C  [id number]  [place] [id number]     RE: Jena Ibarra  : 1955  MIKE: 3/8/2023    Jena Ibarra is a very pleasant 67 year old female here for rectal bleeding.    Jena reports many years of intermittent rectal bleeding with bowel movements. This occurs every few months. No rectal pain. She does notice some prolapsing tissue with bowel movements as well. She reports that her bowel movements are typically soft, but she has chronic pancreatitis. When this flares up, she gets more frequent loose stools and this seems to aggravate the bleeding.     Colonoscopy 23 with sigmoid diverticulosis, otherwise normal. No family history of colorectal cancer.     Physical Examination:  /88 (BP Location: Left arm, Patient Position: Sitting, Cuff Size: Adult Regular)   Pulse 87   SpO2 100%   General: alert, oriented, in no acute distress, sitting comfortably  HEENT: moist mucous membranes    Perianal external examination: Exam was chaperoned by Gulshan Ho EMT-P   Perianal skin: Intact with no excoriation or lichenification.  Lesions: No evidence of an external lesion, nodularity, or induration in the perianal region.  Eversion of buttocks: There was not evidence of an anal fissure. Details: N/A.  Skin tags or external hemorrhoids: Yes: External hemorrhoid right anterior and right posterior, both small in size    Digital rectal examination: Was performed.   Sphincter tone: Good.  Palpable lesions: No.  Other: None  Bimanual examination: was not performed    Anoscopy: Was performed.   Hemorrhoids: Yes. Grade 2-3 right anterior and right posterior. Grade 1-2 left lateral. No active bleeding.  Lesions: No    Assessment/Plan: Jena Ibarra is a 67 year old female with symptomatic internal hemorrhoids. We discussed treatment options including banding vs hemorrhoidectomy. Discussed expected  recovery for both. She would prefer to just have surgery. Plan for examination under anesthesia with hemorrhoidectomy. Our surgery scheduler will reach out to her. Contact us in the meantime with questions. Patient's questions were answered to her stated satisfaction and she is in agreement with this plan.     Medical history:  Past Medical History:   Diagnosis Date     Diverticulosis      History of colon polyps      Hyperlipidemia LDL goal < 160      Osteopenia        Surgical history:  Past Surgical History:   Procedure Laterality Date     BIOPSY       CHOLECYSTECTOMY       COLONOSCOPY       COLONOSCOPY N/A 1/31/2023    Procedure: COLONOSCOPY;  Surgeon: Oscar Tyler MD;  Location: [facility]     COLONOSCOPY WITH CO2 INSUFFLATION N/A 10/13/2014    Procedure: COLONOSCOPY WITH CO2 INSUFFLATION;  Surgeon: Jamal Beach MD;  [facility]     COLONOSCOPY WITH CO2 INSUFFLATION N/A 11/14/2017    Procedure: COLONOSCOPY WITH CO2 INSUFFLATION;  COLON-RECTAL BLEEDING / BARUSYA;  Surgeon: Henok Jewell MD;  Location: [facility]     COMBINED ESOPHAGOSCOPY, GASTROSCOPY, DUODENOSCOPY (EGD) WITH CO2 INSUFFLATION N/A 7/25/2019    Procedure: ESOPHAGOGASTRODUODENOSCOPY, WITH CO2 INSUFFLATION;  Surgeon: Jamal Beach MD;  [facility]: [facility]     ESOPHAGOSCOPY, GASTROSCOPY, DUODENOSCOPY (EGD), COMBINED N/A 7/25/2019    Procedure: Esophagogastroduodenoscopy, With Biopsy;  Surgeon: Jamal Beach MD;  Location: [facility]     ESOPHAGOSCOPY, GASTROSCOPY, DUODENOSCOPY (EGD), COMBINED N/A 9/18/2019    Procedure: ESOPHAGOGASTRODUODENOSCOPY, WITH ENDOSCOPIC US;  Surgeon: Ric Jaeger MD;  Location: [facility]     EYE SURGERY       TUBAL LIGATION         Problem list:  Patient Active Problem List    Diagnosis Date Noted     Chronic pancreatitis, unspecified pancreatitis type (H) 11/15/2022     Priority: Medium     S/P laparoscopic cholecystectomy 10/15/2019     Priority: Medium     Gallstones 09/03/2019      Priority: Medium     Abnormal biliary HIDA scan 09/03/2019     Priority: Medium     Atrophic vaginitis 12/01/2014     Priority: Medium     Radial styloid tenosynovitis 04/18/2013     Priority: Medium     CTS (carpal tunnel syndrome) 04/18/2013     Priority: Medium     CARDIOVASCULAR SCREENING; LDL GOAL LESS THAN 160 10/31/2010     Priority: Medium       Medications:  Current Outpatient Medications   Medication Sig Dispense Refill     Carboxymethylcellul-Glycerin (REFRESH OPTIVE) 1-0.9 % GEL        cetirizine (ZYRTEC) 10 MG tablet Take 10 mg by mouth daily as needed for allergies       Olopatadine HCl (PATADAY OP)        bisacodyl (DULCOLAX) 5 MG EC tablet Take 2 tablets at 3 pm the day before your procedure. If your procedure is before 11 am, take 2 additional tablets at 11 pm. If your procedure is after 11 am, take 2 additional tablets at 6 am. For additional instructions refer to your colonoscopy prep instructions. 4 tablet 0     Polyethyl Glycol-Propyl Glycol (SYSTANE HYDRATION PF OP)        polyethylene glycol (GOLYTELY) 236 g suspension The night before the exam at 6 pm drink an 8-ounce glass every 15 minutes until the jug is half empty. If you arrive before 11 AM: Drink the other half of the Golytely jug at 11 PM night before procedure. If you arrive after 11 AM: Drink the other half of the Golytely jug at 6 AM day of procedure. For additional instructions refer to your colonoscopy prep instructions. 4000 mL 0       Allergies:  No Known Allergies    Family history:  Family History   Problem Relation Age of Onset     Cancer Mother         kidney     Diabetes Mother      Heart Disease Mother      Lipids Mother      Hypertension Mother      Colon Polyps Mother          benign     Hyperlipidemia Mother      Other Cancer Mother         Kidney     Osteoporosis Mother      Obesity Mother      Arthritis Father      Lipids Father      Hypertension Father      Osteoporosis Father      Hyperlipidemia Father       Prostate Cancer Father      Anxiety Disorder Father      Mental Illness Father      Arthritis Sister      Neurologic Disorder Sister         seizures     Obesity Sister      Heart Disease Sister      Cancer Brother         pancreatic     Coronary Artery Disease Brother      Obesity Brother      Other Cancer Brother         Pancreas     Thyroid Disease Son      Obesity Sister      Breast Cancer No family hx of        Social history:  Social History     Tobacco Use     Smoking status: Never     Smokeless tobacco: Never   Substance Use Topics     Alcohol use: Yes     Comment: occasional    Marital status: .  Occupation: retired.    Nursing Notes:   Gulshan Ho EMT  3/8/2023 10:28 AM  Signed  Chief Complaint   Patient presents with     Hemorrhoids       Vitals:    03/08/23 1026   BP: 138/88   BP Location: Left arm   Patient Position: Sitting   Cuff Size: Adult Regular   Pulse: 87   SpO2: 100%       There is no height or weight on file to calculate BMI.    Gulshan Ho EMT-P         25 minutes spent on the date of encounter (excluding time performing procedures) performing chart review, history and exam, documentation and further activities as noted above with an additional 2 minutes for anoscopy.     -----------------------------------------------------  Arina Swenson PA-C  Colon and Rectal Surgery   [facility]

## 2023-03-08 NOTE — LETTER
3/8/2023       RE: Jena Ibarra  [address] [place] [address]     Dear Colleague,    Thank you for referring your patient, Jena Ibarra, to the [facility] COLON AND RECTAL SURGERY CLINIC [place] at [facility]. Please see a copy of my visit note below.    Colon and Rectal Surgery Consult Clinic Note    Date: 3/8/2023     Referring provider:  Gopi Dee PA-C  [address] [place] [address]     RE: Jena Ibarra  : 1955  MIKE: 3/8/2023    Jena Ibarra is a very pleasant 67 year old female here for rectal bleeding.    Jena reports many years of intermittent rectal bleeding with bowel movements. This occurs every few months. No rectal pain. She does notice some prolapsing tissue with bowel movements as well. She reports that her bowel movements are typically soft, but she has chronic pancreatitis. When this flares up, she gets more frequent loose stools and this seems to aggravate the bleeding.     Colonoscopy 23 with sigmoid diverticulosis, otherwise normal. No family history of colorectal cancer.     Physical Examination:  /88 (BP Location: Left arm, Patient Position: Sitting, Cuff Size: Adult Regular)   Pulse 87   SpO2 100%   General: alert, oriented, in no acute distress, sitting comfortably  HEENT: moist mucous membranes    Perianal external examination: Exam was chaperoned by Gulshan Ho, EMT-P   Perianal skin: Intact with no excoriation or lichenification.  Lesions: No evidence of an external lesion, nodularity, or induration in the perianal region.  Eversion of buttocks: There was not evidence of an anal fissure. Details: N/A.  Skin tags or external hemorrhoids: Yes: External hemorrhoid right anterior and right posterior, both small in size    Digital rectal examination: Was performed.   Sphincter tone: Good.  Palpable lesions: No.  Other: None  Bimanual examination: was not performed    Anoscopy: Was  performed.   Hemorrhoids: Yes. Grade 2-3 right anterior and right posterior. Grade 1-2 left lateral. No active bleeding.  Lesions: No    Assessment/Plan: Jena Ibarra is a 67 year old female with symptomatic internal hemorrhoids. We discussed treatment options including banding vs hemorrhoidectomy. Discussed expected recovery for both. She would prefer to just have surgery. Plan for examination under anesthesia with hemorrhoidectomy. Our surgery scheduler will reach out to her. Contact us in the meantime with questions. Patient's questions were answered to her stated satisfaction and she is in agreement with this plan.     Medical history:  Past Medical History:   Diagnosis Date     Diverticulosis      History of colon polyps      Hyperlipidemia LDL goal < 160      Osteopenia        Surgical history:  Past Surgical History:   Procedure Laterality Date     BIOPSY       CHOLECYSTECTOMY       COLONOSCOPY       COLONOSCOPY N/A 1/31/2023    Procedure: COLONOSCOPY;  Surgeon: Oscar Tyler MD;  Location: [facility]     COLONOSCOPY WITH CO2 INSUFFLATION N/A 10/13/2014    Procedure: COLONOSCOPY WITH CO2 INSUFFLATION;  Surgeon: Jamal Beach MD;  Location: [facility]     COLONOSCOPY WITH CO2 INSUFFLATION N/A 11/14/2017    Procedure: COLONOSCOPY WITH CO2 INSUFFLATION;  COLON-RECTAL BLEEDING / BARUSYA;  Surgeon: Henok Jewell MD;  Location: [facility]     COMBINED ESOPHAGOSCOPY, GASTROSCOPY, DUODENOSCOPY (EGD) WITH CO2 INSUFFLATION N/A 7/25/2019    Procedure: ESOPHAGOGASTRODUODENOSCOPY, WITH CO2 INSUFFLATION;  Surgeon: Jamal Beach MD;  [facility]     ESOPHAGOSCOPY, GASTROSCOPY, DUODENOSCOPY (EGD), COMBINED N/A 7/25/2019    Procedure: Esophagogastroduodenoscopy, With Biopsy;  Surgeon: Jamal Beach MD;  [facility]     ESOPHAGOSCOPY, GASTROSCOPY, DUODENOSCOPY (EGD), COMBINED N/A 9/18/2019    Procedure: ESOPHAGOGASTRODUODENOSCOPY, WITH ENDOSCOPIC US;  Surgeon: Ric Jaeger,  MD;  Location: [facility]     EYE SURGERY       TUBAL LIGATION         Problem list:  Patient Active Problem List    Diagnosis Date Noted     Chronic pancreatitis, unspecified pancreatitis type (H) 11/15/2022     Priority: Medium     S/P laparoscopic cholecystectomy 10/15/2019     Priority: Medium     Gallstones 09/03/2019     Priority: Medium     Abnormal biliary HIDA scan 09/03/2019     Priority: Medium     Atrophic vaginitis 12/01/2014     Priority: Medium     Radial styloid tenosynovitis 04/18/2013     Priority: Medium     CTS (carpal tunnel syndrome) 04/18/2013     Priority: Medium     CARDIOVASCULAR SCREENING; LDL GOAL LESS THAN 160 10/31/2010     Priority: Medium       Medications:  Current Outpatient Medications   Medication Sig Dispense Refill     Carboxymethylcellul-Glycerin (REFRESH OPTIVE) 1-0.9 % GEL        cetirizine (ZYRTEC) 10 MG tablet Take 10 mg by mouth daily as needed for allergies       Olopatadine HCl (PATADAY OP)        bisacodyl (DULCOLAX) 5 MG EC tablet Take 2 tablets at 3 pm the day before your procedure. If your procedure is before 11 am, take 2 additional tablets at 11 pm. If your procedure is after 11 am, take 2 additional tablets at 6 am. For additional instructions refer to your colonoscopy prep instructions. 4 tablet 0     Polyethyl Glycol-Propyl Glycol (SYSTANE HYDRATION PF OP)        polyethylene glycol (GOLYTELY) 236 g suspension The night before the exam at 6 pm drink an 8-ounce glass every 15 minutes until the jug is half empty. If you arrive before 11 AM: Drink the other half of the Golytely jug at 11 PM night before procedure. If you arrive after 11 AM: Drink the other half of the Golytely jug at 6 AM day of procedure. For additional instructions refer to your colonoscopy prep instructions. 4000 mL 0       Allergies:  No Known Allergies    Family history:  Family History   Problem Relation Age of Onset     Cancer Mother         kidney     Diabetes Mother      Heart Disease Mother       Lipids Mother      Hypertension Mother      Colon Polyps Mother          benign     Hyperlipidemia Mother      Other Cancer Mother         Kidney     Osteoporosis Mother      Obesity Mother      Arthritis Father      Lipids Father      Hypertension Father      Osteoporosis Father      Hyperlipidemia Father      Prostate Cancer Father      Anxiety Disorder Father      Mental Illness Father      Arthritis Sister      Neurologic Disorder Sister         seizures     Obesity Sister      Heart Disease Sister      Cancer Brother         pancreatic     Coronary Artery Disease Brother      Obesity Brother      Other Cancer Brother         Pancreas     Thyroid Disease Son      Obesity Sister      Breast Cancer No family hx of        Social history:  Social History     Tobacco Use     Smoking status: Never     Smokeless tobacco: Never   Substance Use Topics     Alcohol use: Yes     Comment: occasional    Marital status: .  Occupation: retired.    Nursing Notes:   Gulshan Ho, EMT  3/8/2023 10:28 AM  Signed  Chief Complaint   Patient presents with     Hemorrhoids       Vitals:    03/08/23 1026   BP: 138/88   BP Location: Left arm   Patient Position: Sitting   Cuff Size: Adult Regular   Pulse: 87   SpO2: 100%       There is no height or weight on file to calculate BMI.    Gulshan Ho EMT-P         25 minutes spent on the date of encounter (excluding time performing procedures) performing chart review, history and exam, documentation and further activities as noted above with an additional 2 minutes for anoscopy.     -----------------------------------------------------  Arina Swenson PA-C  Colon and Rectal Surgery   [facility]

## 2023-03-08 NOTE — TELEPHONE ENCOUNTER
"Per Case Request, surgery can be scheduled with any Colon & Rectal surgeon.    Spoke with patient via phone, completed the following scheduling, then sent Surgery Packet to patient via MyChart and Mail including related scheduling information and prep instructions:     Required: Yes, you will need a  18 years or older to drive you home from your procedure as well as stay with you for 24 hours after your procedure    Surgery: EXAM UNDER ANESTHESIA, ANUS, HEMORRHOIDECTOMY    Date: Wednesday March 15, 2023      Surgeon: Dr. Luis A Shaikh    Time: You will receive a call 1-3 days prior to your surgery with your finalized surgery and arrival time.     Location: [facility] - [address] [place], [place] [address] [phone]      Upcoming Related Appointments:     Mar 14, 2023  1:00 PM  (Arrive by 12:45 PM)  PAC EVALUATION with DEANDRE Frank CNP  [facility] Preoperative Assessment Center [facility] ([facility] ) [phone]    VIDEO VISIT     Apr 12, 2023  7:00 AM  (Arrive by 6:45 AM)  Post Op with DEANDRE Toure CNP  [facility] Colon and Rectal Surgery Clinic [facility] ([facility] ) [phone]    [address] [place] [address] [address] [facility] [address]     Preparation: 2 Fleet Enemas (See \"Day of Surgery\")    Emily Bravo  [place]-op Coordinator  [place]-[facility]  Direct Phone: [phone]      "

## 2023-03-09 NOTE — TELEPHONE ENCOUNTER
FUTURE VISIT INFORMATION      SURGERY INFORMATION:    Date: 3/15/23    [facility]    Surgeon:  Luis A Shaikh MD    Anesthesia Type:  general    Procedure: EXAM UNDER ANESTHESIA, ANUS, HEMORRHOIDECTOMY    RECORDS REQUESTED FROM:       Primary Care Provider: Gopi Dee PA-C- [facility]    Most recent EKG+ Tracin19

## 2023-03-14 ENCOUNTER — PRE VISIT (OUTPATIENT)
Dept: SURGERY | Facility: CLINIC | Age: 68
End: 2023-03-14

## 2023-03-14 ENCOUNTER — ANESTHESIA EVENT (OUTPATIENT)
Dept: SURGERY | Facility: AMBULATORY SURGERY CENTER | Age: 68
End: 2023-03-14
Payer: COMMERCIAL

## 2023-03-14 ENCOUNTER — VIRTUAL VISIT (OUTPATIENT)
Dept: SURGERY | Facility: CLINIC | Age: 68
End: 2023-03-14
Payer: COMMERCIAL

## 2023-03-14 DIAGNOSIS — Z01.818 PRE-OP EVALUATION: Primary | ICD-10-CM

## 2023-03-14 PROCEDURE — 99214 OFFICE O/P EST MOD 30 MIN: CPT | Mod: VID | Performed by: NURSE PRACTITIONER

## 2023-03-14 ASSESSMENT — LIFESTYLE VARIABLES: TOBACCO_USE: 0

## 2023-03-14 ASSESSMENT — ENCOUNTER SYMPTOMS: ORTHOPNEA: 0

## 2023-03-14 NOTE — PROGRESS NOTES
Tamara is a 67 year old who is being evaluated via a billable video visit.      How would you like to obtain your AVS? Parihart          Subjective   Tamara is a 67 year old presenting for the following health issues:  Pre-Op Exam      HPI         Review of Systems                Physical Exam     ALIVIA Floyd LPN

## 2023-03-14 NOTE — H&P
Pre-Operative H & P     CC:  Preoperative exam to assess for increased cardiopulmonary risk while undergoing surgery and anesthesia.    Date of Encounter: 3/14/2023  Primary Care Physician:  Leidy Nguyen     Reason for visit:Pre-operative evaluation    HPI  Jena Ibarra is a 67 year old female who presents for pre-operative H & P in preparation for  Procedure Information     Case: [id number] Date/Time: 03/15/23 0935    Procedure: EXAM UNDER ANESTHESIA, ANUS, HEMORRHOIDECTOMY (Anus)    Anesthesia type: General    Diagnosis: Internal hemorrhoid [K64.8]    Pre-op diagnosis: Internal hemorrhoid [K64.8]    Location: [place] [address] / [facility]    Providers: Luis A Shaikh MD          Jena Ibarra is a  67-year-old female with past medical history significant for chronic pancreatitis, HL, osteopenia, gallstones status post cholecystectomy, seasonal allergies, carpal tunnel syndrome and hemorrhoids.  Her ongoing symptoms include many years of intermittent rectal bleeding with bowel movements.  This occurs every few months.  She does notice some prolapsing tissue with bowel movements as well.  Most recent colonoscopy 1/31/2023 showed sigmoid diverticulosis, otherwise normal.  She presents today in preparation for the above recommended procedure with Dr. Shaikh    History is obtained from the patient and chart review    Hx of abnormal bleeding or anti-platelet use: no    Menstrual history: No LMP recorded. Patient is postmenopausal.:      Past Medical History  Past Medical History:   Diagnosis Date     Diverticulosis      History of colon polyps      Hyperlipidemia LDL goal < 160      Osteopenia        Past Surgical History  Past Surgical History:   Procedure Laterality Date     BIOPSY       CHOLECYSTECTOMY       COLONOSCOPY       COLONOSCOPY N/A 1/31/2023    Procedure: COLONOSCOPY;  Surgeon: Oscar Tyler MD;  Location:  GI     COLONOSCOPY WITH CO2  INSUFFLATION N/A 10/13/2014    Procedure: COLONOSCOPY WITH CO2 INSUFFLATION;  Surgeon: Jamal Beach MD;  [facility]: [facility]     COLONOSCOPY WITH CO2 INSUFFLATION N/A 11/14/2017    Procedure: COLONOSCOPY WITH CO2 INSUFFLATION;  COLON-RECTAL BLEEDING / BARUSYA;  Surgeon: Henok Jewell MD;  [facility]     COMBINED ESOPHAGOSCOPY, GASTROSCOPY, DUODENOSCOPY (EGD) WITH CO2 INSUFFLATION N/A 7/25/2019    Procedure: ESOPHAGOGASTRODUODENOSCOPY, WITH CO2 INSUFFLATION;  Surgeon: Jamal Beach MD;  [facility]     ESOPHAGOSCOPY, GASTROSCOPY, DUODENOSCOPY (EGD), COMBINED N/A 7/25/2019    Procedure: Esophagogastroduodenoscopy, With Biopsy;  Surgeon: Jamal Beach MD;  [facility]: [facility]     ESOPHAGOSCOPY, GASTROSCOPY, DUODENOSCOPY (EGD), COMBINED N/A 9/18/2019    Procedure: ESOPHAGOGASTRODUODENOSCOPY, WITH ENDOSCOPIC US;  Surgeon: Ric Jaeger MD;  Location: [facility]     EYE SURGERY       TUBAL LIGATION         Prior to Admission Medications  Current Outpatient Medications   Medication Sig Dispense Refill     Carboxymethylcellul-Glycerin (REFRESH OPTIVE) 1-0.9 % GEL  (Patient not taking: Reported on 3/14/2023)       cetirizine (ZYRTEC) 10 MG tablet Take 10 mg by mouth daily as needed for allergies (Patient not taking: Reported on 3/14/2023)       Olopatadine HCl (PATADAY OP)  (Patient not taking: Reported on 3/14/2023)         Allergies  No Known Allergies    Social History  Social History     Socioeconomic History     Marital status:      Spouse name: Not on file     Number of children: Not on file     Years of education: Not on file     Highest education level: Not on file   Occupational History     Employer: [employer]   Tobacco Use     Smoking status: Never     Smokeless tobacco: Never   Vaping Use     Vaping Use: Never used   Substance and Sexual Activity     Alcohol use: Not Currently     Comment: occasional     Drug use: No     Sexual activity: Yes     Partners: Male      Birth control/protection: Post-menopausal, Female Surgical     Comment: tubal ligation   Other Topics Concern     Parent/sibling w/ CABG, MI or angioplasty before 65F 55M? Yes     Comment: brother   Social History Narrative     Not on file     Social Determinants of Health     Financial Resource Strain: Not on file   Food Insecurity: Not on file   Transportation Needs: Not on file   Physical Activity: Not on file   Stress: Not on file   Social Connections: Not on file   Intimate Partner Violence: Not on file   Housing Stability: Not on file       Family History  Family History   Problem Relation Age of Onset     Cancer Mother         kidney     Diabetes Mother      Heart Disease Mother      Lipids Mother      Hypertension Mother      Colon Polyps Mother          benign     Hyperlipidemia Mother      Other Cancer Mother         Kidney     Osteoporosis Mother      Obesity Mother      Arthritis Father      Lipids Father      Hypertension Father      Osteoporosis Father      Hyperlipidemia Father      Prostate Cancer Father      Anxiety Disorder Father      Mental Illness Father      Arthritis Sister      Neurologic Disorder Sister         seizures     Obesity Sister      Heart Disease Sister      Cancer Brother         pancreatic     Coronary Artery Disease Brother      Obesity Brother      Other Cancer Brother         Pancreas     Thyroid Disease Son      Obesity Sister      Breast Cancer No family hx of        Review of Systems  The complete review of systems is negative other than noted in the HPI or here.   Anesthesia Evaluation   Pt has had prior anesthetic. Type: General and MAC.    No history of anesthetic complications       ROS/MED HX  ENT/Pulmonary:    (-) tobacco use and asthma   Neurologic:  - neg neurologic ROS     Cardiovascular:     (+) -----No previous cardiac testing  (-) DANIELSON, orthopnea/PND and irregular heartbeat/palpitations   METS/Exercise Tolerance: >4 METS Comment: Exercises at home and walks  daily 2-4 miles daily    Hematologic:  - neg hematologic  ROS     Musculoskeletal:  - neg musculoskeletal ROS     GI/Hepatic: Comment: Chronic pancreatitis - upset stomach occasionally     (+) cholecystitis/cholelithiasis,     Renal/Genitourinary:  - neg Renal ROS     Endo:  - neg endo ROS     Psychiatric/Substance Use:  - neg psychiatric ROS     Infectious Disease:  - neg infectious disease ROS     Malignancy:  - neg malignancy ROS     Other: Comment: Cataracts             Virtual visit -  No vitals were obtained    Physical Exam  Constitutional: Awake, alert, cooperative, no apparent distress, and appears stated age.  Eyes: Pupils equal  HENT: Normocephalic  Respiratory: non labored breathing   Neurologic: Awake, alert, oriented to name, place and time.   Neuropsychiatric: Calm, cooperative. Normal affect.      Prior Labs/Diagnostic Studies   All labs and imaging personally reviewed   Last Comprehensive Metabolic Panel:  Lab Results   Component Value Date     11/15/2022    POTASSIUM 5.1 11/15/2022    CHLORIDE 111 (H) 11/15/2022    CO2 30 11/15/2022    ANIONGAP <1 (L) 11/15/2022     (H) 11/15/2022    BUN 16 11/15/2022    CR 0.92 11/15/2022    GFRESTIMATED 67 11/15/2022    SALEEM 9.5 11/15/2022       Lab Results   Component Value Date    AST 12 11/15/2022    AST 16 05/04/2021     Lab Results   Component Value Date    ALT 28 11/15/2022    ALT 25 05/04/2021     No results found for: BILICONJ   Lab Results   Component Value Date    BILITOTAL 0.5 11/15/2022    BILITOTAL 0.8 05/04/2021     Lab Results   Component Value Date    ALBUMIN 4.0 11/15/2022    ALBUMIN 4.0 05/04/2021     Lab Results   Component Value Date    PROTTOTAL 7.0 11/15/2022    PROTTOTAL 7.2 05/04/2021      Lab Results   Component Value Date    ALKPHOS 66 11/15/2022    ALKPHOS 62 05/04/2021         EKG/ stress test - if available please see in ROS above   No results found.  No flowsheet data found.      The patient's records and results  "personally reviewed by this provider.     Outside records reviewed from: Care Everywhere      Assessment      Jena Ibarra is a 67 year old female seen as a PAC referral for risk assessment and optimization for anesthesia.    Plan/Recommendations  Pt will be optimized for the proposed procedure.  See below for details on the assessment, risk, and preoperative recommendations    NEUROLOGY  - No history of TIA, CVA or seizure  -Post Op delirium risk factors:  No risk identified    ENT  - No current airway concerns.  Will need to be reassessed day of surgery.  Mallampati: Unable to assess  TM: Unable to assess    CARDIAC  - No history of CAD, Hypertension and Afib   No anginal symptoms, Denies palpitations, PND, dizziness or syncope.     - METS (Metabolic Equivalents) exercises in her home, walks 2-4 miles daily with her dogs.   Patient performs 4 or more METS exercise without symptoms            Total Score: 0      RCRI-Very low risk: Class 1 0.4% complication rate            Total Score: 0        PULMONARY    JUDY Low Risk            Total Score: 1    JUDY: Over 50 ys old        - Tobacco History      History   Smoking Status     Never   Smokeless Tobacco     Never       GI  Chronic pancreatitis -Asymptomatic currently    PONV Medium Risk  Total Score: 2           1 AN PONV: Pt is Female    1 AN PONV: Patient is not a current smoker        /RENAL  - Baseline Creatinine  WNL    ENDOCRINE    - BMI: Estimated body mass index is 28.29 kg/m  as calculated from the following:    Height as of 1/31/23: 1.651 m (5' 5\").    Weight as of 1/31/23: 77.1 kg (170 lb).  Overweight (BMI 25.0-29.9)  - No history of Diabetes Mellitus    HEME  VTE Low Risk 0.26%            Total Score: 1    VTE: Greater than 59 yrs old      - No history of abnormal bleeding or antiplatelet use.      MSK  Patient is NOT Frail            Total Score: 0            Different anesthesia methods/types have been discussed with the patient, but they are aware " that the final plan will be decided by the assigned anesthesia provider on the date of service.      The patient is optimized for their procedure. AVS with information on surgery time/arrival time, meds and NPO status given by nursing staff. No further diagnostic testing indicated.    Please refer to the physical examination documented by the anesthesiologist in the anesthesia record on the day of surgery.    Video-Visit Details    Type of service:  Video Visit    Provider received verbal consent for a Video Visit from the patient? Yes   Video Start Time: 12:55pm  Video End Time:1:07 PM    Originating Location (pt. Location): Home    Distant Location (provider location):  Off-site  Mode of Communication:  Video Conference via bitFlyer  On the day of service:     Prep time: 10 minutes  Visit time: 12 minutes  Documentation time: 10 minutes  ------------------------------------------  Total time: 32 minutes      DEANDRE Chavez CNP  Preoperative Assessment Center  [facility]  Clinic and Surgery Center  Phone: [phone]  Fax: [phone]

## 2023-03-14 NOTE — PATIENT INSTRUCTIONS
Preparing for Your Surgery      Name:  Jena Ibarra   MRN:  [id number]   :  1955   Today's Date:  3/14/2023         Arriving for surgery:  Surgery date:  3/15/23  Arrival time:  8:15am  Surgery time: 9:45am    Restrictions due to COVID 19:    2 visitors may accompany each patient  Visitors may wait for patient in the Surgery Center Waiting room  All visitors must wear a mask and socially distance        parking is available for anyone with mobility limitations or disabilities. (Monday- Friday 7 am- 5 pm)    Please come to:    [facility] Clinics and Surgery Center  [address] [place] [id number]    Check in on the 5th floor, Ambulatory Surgery Center.    What can I eat or drink?    -  You may eat and drink normally until 8 hours before arrival time  (Until 12:15am on 3/15/23)  -  You may have clear liquids until 2 hours before arrival time  (Until 6:15am on 3/15/23)    Your surgeon would like you to use 2 Fleet Enemas before coming in. The 1st at 6:15am and the 2nd at 6:45am      Examples of clear liquids:  Water  Clear broth  Juices (apple, white grape, white cranberry  and cider) without pulp  Noncarbonated, powder based beverages  (lemonade and Tuan-Aid)  Sodas (Sprite, 7-Up, ginger ale and seltzer)  Coffee or tea (without milk or cream)  Gatorade    --No alcohol for at least 24 hours before surgery    Which medicines can I take?      Hold Ibuprofen (Advil, Motrin) for 1 day before surgery--unless otherwise directed by surgeon.  Hold Naproxen (Aleve) for 4 days before surgery.          How do I prepare myself?  - Please take 2 showers (one the night prior to surgery and one the morning of surgery) using Scrubcare or Hibiclens soap.    Use this soap only from the neck to your toes.     Leave the soap on your skin for one minute--then rinse thoroughly.      You may use your own shampoo and conditioner; no other hair products.   - Please remove all jewelry and body piercings.  - No lotions,  deodorants or fragrance.  - No makeup or fingernail polish.   - Bring your ID and insurance card.    -If you have a Deep Brain Stimulator, a Spinal Cord Stimulator or any implanted Neuro device you must bring the remote to the Surgery Center          ALL PATIENTS ARE REQUIRED TO HAVE A RESPONSIBLE ADULT TO DRIVE AND BE IN ATTENDANCE WITH THEM FOR 24 HOURS FOLLOWING SURGERY       Covid testing policy as of 12/06/2022  Your surgeon will notify and schedule you for a COVID test if one is needed before surgery--please direct any questions or COVID symptoms to your surgeon      Questions or Concerns:    -For questions regarding the day of surgery please contact the Ambulatory Surgery Center at [phone].    -If you have health changes between today and your surgery please contact your surgeon.     For questions after surgery please call your surgeons office.

## 2023-03-15 ENCOUNTER — HOSPITAL ENCOUNTER (OUTPATIENT)
Facility: AMBULATORY SURGERY CENTER | Age: 68
Discharge: HOME | End: 2023-03-15
Attending: SURGERY | Admitting: SURGERY
Payer: COMMERCIAL

## 2023-03-15 ENCOUNTER — ANESTHESIA (OUTPATIENT)
Dept: SURGERY | Facility: AMBULATORY SURGERY CENTER | Age: 68
End: 2023-03-15
Payer: COMMERCIAL

## 2023-03-15 VITALS
WEIGHT: 175 LBS | HEIGHT: 65 IN | DIASTOLIC BLOOD PRESSURE: 85 MMHG | HEART RATE: 77 BPM | SYSTOLIC BLOOD PRESSURE: 141 MMHG | RESPIRATION RATE: 14 BRPM | OXYGEN SATURATION: 97 % | BODY MASS INDEX: 29.16 KG/M2 | TEMPERATURE: 97.1 F

## 2023-03-15 DIAGNOSIS — K64.8 INTERNAL HEMORRHOID: Primary | ICD-10-CM

## 2023-03-15 PROCEDURE — 46260 REMOVE IN/EX HEM GROUPS 2+: CPT | Performed by: SURGERY

## 2023-03-15 PROCEDURE — 88304 TISSUE EXAM BY PATHOLOGIST: CPT | Mod: TC,91 | Performed by: SURGERY

## 2023-03-15 PROCEDURE — 88304 TISSUE EXAM BY PATHOLOGIST: CPT | Mod: 26 | Performed by: PATHOLOGY

## 2023-03-15 PROCEDURE — 46260 REMOVE IN/EX HEM GROUPS 2+: CPT

## 2023-03-15 RX ORDER — DIAZEPAM 10 MG/2ML
2.5 INJECTION, SOLUTION INTRAMUSCULAR; INTRAVENOUS
Status: DISCONTINUED | OUTPATIENT
Start: 2023-03-15 | End: 2023-03-16 | Stop reason: HOSPADM

## 2023-03-15 RX ORDER — ONDANSETRON 4 MG/1
4 TABLET, ORALLY DISINTEGRATING ORAL
Status: DISCONTINUED | OUTPATIENT
Start: 2023-03-15 | End: 2023-03-16 | Stop reason: HOSPADM

## 2023-03-15 RX ORDER — LABETALOL HYDROCHLORIDE 5 MG/ML
10 INJECTION, SOLUTION INTRAVENOUS
Status: DISCONTINUED | OUTPATIENT
Start: 2023-03-15 | End: 2023-03-16 | Stop reason: HOSPADM

## 2023-03-15 RX ORDER — BUPIVACAINE HYDROCHLORIDE 2.5 MG/ML
INJECTION, SOLUTION INFILTRATION; PERINEURAL PRN
Status: DISCONTINUED | OUTPATIENT
Start: 2023-03-15 | End: 2023-03-15 | Stop reason: HOSPADM

## 2023-03-15 RX ORDER — OXYCODONE HYDROCHLORIDE 5 MG/1
10 TABLET ORAL
Status: DISCONTINUED | OUTPATIENT
Start: 2023-03-15 | End: 2023-03-16 | Stop reason: HOSPADM

## 2023-03-15 RX ORDER — ACETAMINOPHEN 325 MG/1
975 TABLET ORAL ONCE
Status: DISCONTINUED | OUTPATIENT
Start: 2023-03-15 | End: 2023-03-15 | Stop reason: HOSPADM

## 2023-03-15 RX ORDER — FENTANYL CITRATE 50 UG/ML
INJECTION, SOLUTION INTRAMUSCULAR; INTRAVENOUS PRN
Status: DISCONTINUED | OUTPATIENT
Start: 2023-03-15 | End: 2023-03-15

## 2023-03-15 RX ORDER — FENTANYL CITRATE 50 UG/ML
25 INJECTION, SOLUTION INTRAMUSCULAR; INTRAVENOUS EVERY 5 MIN PRN
Status: DISCONTINUED | OUTPATIENT
Start: 2023-03-15 | End: 2023-03-16 | Stop reason: HOSPADM

## 2023-03-15 RX ORDER — SODIUM CHLORIDE, SODIUM LACTATE, POTASSIUM CHLORIDE, CALCIUM CHLORIDE 600; 310; 30; 20 MG/100ML; MG/100ML; MG/100ML; MG/100ML
INJECTION, SOLUTION INTRAVENOUS CONTINUOUS
Status: DISCONTINUED | OUTPATIENT
Start: 2023-03-15 | End: 2023-03-16 | Stop reason: HOSPADM

## 2023-03-15 RX ORDER — HALOPERIDOL 5 MG/ML
1 INJECTION INTRAMUSCULAR
Status: DISCONTINUED | OUTPATIENT
Start: 2023-03-15 | End: 2023-03-16 | Stop reason: HOSPADM

## 2023-03-15 RX ORDER — PROPOFOL 10 MG/ML
INJECTION, EMULSION INTRAVENOUS PRN
Status: DISCONTINUED | OUTPATIENT
Start: 2023-03-15 | End: 2023-03-15

## 2023-03-15 RX ORDER — ONDANSETRON 4 MG/1
4 TABLET, ORALLY DISINTEGRATING ORAL EVERY 30 MIN PRN
Status: DISCONTINUED | OUTPATIENT
Start: 2023-03-15 | End: 2023-03-16 | Stop reason: HOSPADM

## 2023-03-15 RX ORDER — HYDROXYZINE HYDROCHLORIDE 10 MG/1
10 TABLET, FILM COATED ORAL EVERY 6 HOURS PRN
Status: DISCONTINUED | OUTPATIENT
Start: 2023-03-15 | End: 2023-03-16 | Stop reason: HOSPADM

## 2023-03-15 RX ORDER — ALBUTEROL SULFATE 0.83 MG/ML
2.5 SOLUTION RESPIRATORY (INHALATION) EVERY 4 HOURS PRN
Status: DISCONTINUED | OUTPATIENT
Start: 2023-03-15 | End: 2023-03-16 | Stop reason: HOSPADM

## 2023-03-15 RX ORDER — HYDROMORPHONE HYDROCHLORIDE 1 MG/ML
0.4 INJECTION, SOLUTION INTRAMUSCULAR; INTRAVENOUS; SUBCUTANEOUS EVERY 5 MIN PRN
Status: DISCONTINUED | OUTPATIENT
Start: 2023-03-15 | End: 2023-03-16 | Stop reason: HOSPADM

## 2023-03-15 RX ORDER — KETOROLAC TROMETHAMINE 30 MG/ML
15 INJECTION, SOLUTION INTRAMUSCULAR; INTRAVENOUS
Status: DISCONTINUED | OUTPATIENT
Start: 2023-03-15 | End: 2023-03-16 | Stop reason: HOSPADM

## 2023-03-15 RX ORDER — PROPOFOL 10 MG/ML
INJECTION, EMULSION INTRAVENOUS CONTINUOUS PRN
Status: DISCONTINUED | OUTPATIENT
Start: 2023-03-15 | End: 2023-03-15

## 2023-03-15 RX ORDER — OXYCODONE HYDROCHLORIDE 5 MG/1
5-10 TABLET ORAL EVERY 6 HOURS PRN
Qty: 10 TABLET | Refills: 0 | Status: SHIPPED | OUTPATIENT
Start: 2023-03-15 | End: 2023-12-04

## 2023-03-15 RX ORDER — ACETAMINOPHEN 325 MG/1
975 TABLET ORAL ONCE
Status: COMPLETED | OUTPATIENT
Start: 2023-03-15 | End: 2023-03-15

## 2023-03-15 RX ORDER — MEPERIDINE HYDROCHLORIDE 25 MG/ML
12.5 INJECTION INTRAMUSCULAR; INTRAVENOUS; SUBCUTANEOUS EVERY 5 MIN PRN
Status: DISCONTINUED | OUTPATIENT
Start: 2023-03-15 | End: 2023-03-16 | Stop reason: HOSPADM

## 2023-03-15 RX ORDER — LIDOCAINE 40 MG/G
CREAM TOPICAL
Status: DISCONTINUED | OUTPATIENT
Start: 2023-03-15 | End: 2023-03-15 | Stop reason: HOSPADM

## 2023-03-15 RX ORDER — DEXAMETHASONE SODIUM PHOSPHATE 10 MG/ML
4 INJECTION, SOLUTION INTRAMUSCULAR; INTRAVENOUS
Status: DISCONTINUED | OUTPATIENT
Start: 2023-03-15 | End: 2023-03-16 | Stop reason: HOSPADM

## 2023-03-15 RX ORDER — METRONIDAZOLE 250 MG/1
250 TABLET ORAL 3 TIMES DAILY
Qty: 15 TABLET | Refills: 0 | Status: SHIPPED | OUTPATIENT
Start: 2023-03-15 | End: 2023-03-20

## 2023-03-15 RX ORDER — LIDOCAINE HYDROCHLORIDE AND EPINEPHRINE 10; 10 MG/ML; UG/ML
INJECTION, SOLUTION INFILTRATION; PERINEURAL PRN
Status: DISCONTINUED | OUTPATIENT
Start: 2023-03-15 | End: 2023-03-15 | Stop reason: HOSPADM

## 2023-03-15 RX ORDER — FENTANYL CITRATE 50 UG/ML
50 INJECTION, SOLUTION INTRAMUSCULAR; INTRAVENOUS EVERY 5 MIN PRN
Status: DISCONTINUED | OUTPATIENT
Start: 2023-03-15 | End: 2023-03-16 | Stop reason: HOSPADM

## 2023-03-15 RX ORDER — ONDANSETRON 2 MG/ML
4 INJECTION INTRAMUSCULAR; INTRAVENOUS ONCE
Status: DISCONTINUED | OUTPATIENT
Start: 2023-03-15 | End: 2023-03-15 | Stop reason: HOSPADM

## 2023-03-15 RX ORDER — SODIUM CHLORIDE, SODIUM LACTATE, POTASSIUM CHLORIDE, CALCIUM CHLORIDE 600; 310; 30; 20 MG/100ML; MG/100ML; MG/100ML; MG/100ML
INJECTION, SOLUTION INTRAVENOUS CONTINUOUS
Status: DISCONTINUED | OUTPATIENT
Start: 2023-03-15 | End: 2023-03-15 | Stop reason: HOSPADM

## 2023-03-15 RX ORDER — ONDANSETRON 2 MG/ML
4 INJECTION INTRAMUSCULAR; INTRAVENOUS EVERY 30 MIN PRN
Status: DISCONTINUED | OUTPATIENT
Start: 2023-03-15 | End: 2023-03-16 | Stop reason: HOSPADM

## 2023-03-15 RX ORDER — HYDROMORPHONE HYDROCHLORIDE 1 MG/ML
0.2 INJECTION, SOLUTION INTRAMUSCULAR; INTRAVENOUS; SUBCUTANEOUS EVERY 5 MIN PRN
Status: DISCONTINUED | OUTPATIENT
Start: 2023-03-15 | End: 2023-03-16 | Stop reason: HOSPADM

## 2023-03-15 RX ORDER — FENTANYL CITRATE 50 UG/ML
25 INJECTION, SOLUTION INTRAMUSCULAR; INTRAVENOUS
Status: DISCONTINUED | OUTPATIENT
Start: 2023-03-15 | End: 2023-03-16 | Stop reason: HOSPADM

## 2023-03-15 RX ORDER — OXYCODONE HYDROCHLORIDE 5 MG/1
5 TABLET ORAL
Status: DISCONTINUED | OUTPATIENT
Start: 2023-03-15 | End: 2023-03-16 | Stop reason: HOSPADM

## 2023-03-15 RX ADMIN — PROPOFOL 150 MCG/KG/MIN: 10 INJECTION, EMULSION INTRAVENOUS at 10:03

## 2023-03-15 RX ADMIN — FENTANYL CITRATE 75 MCG: 50 INJECTION, SOLUTION INTRAMUSCULAR; INTRAVENOUS at 09:59

## 2023-03-15 RX ADMIN — Medication 100 MCG: at 10:22

## 2023-03-15 RX ADMIN — Medication 200 MCG: at 10:25

## 2023-03-15 RX ADMIN — PROPOFOL 40 MG: 10 INJECTION, EMULSION INTRAVENOUS at 10:03

## 2023-03-15 RX ADMIN — PROPOFOL 160 MG: 10 INJECTION, EMULSION INTRAVENOUS at 09:59

## 2023-03-15 RX ADMIN — Medication 100 MCG: at 10:11

## 2023-03-15 RX ADMIN — Medication 50 MG: at 10:00

## 2023-03-15 RX ADMIN — SODIUM CHLORIDE, SODIUM LACTATE, POTASSIUM CHLORIDE, CALCIUM CHLORIDE: 600; 310; 30; 20 INJECTION, SOLUTION INTRAVENOUS at 08:51

## 2023-03-15 RX ADMIN — ACETAMINOPHEN 975 MG: 325 TABLET ORAL at 08:53

## 2023-03-15 ASSESSMENT — ENCOUNTER SYMPTOMS: ORTHOPNEA: 0

## 2023-03-15 ASSESSMENT — LIFESTYLE VARIABLES: TOBACCO_USE: 0

## 2023-03-15 NOTE — DISCHARGE INSTRUCTIONS
[facility]  Home Care Following Anesthesia  For 24 hours after surgery:  Get plenty of rest.  A responsible adult must stay with you for at least 24 hours after you leave the surgery center.  Do not drive or use heavy equipment.  If you have weakness or tingling, don't drive or use heavy equipment until this feeling goes away.   Do not drink alcohol.   Avoid strenuous or risky activities.  Ask for help when climbing stairs.  You may feel lightheaded.  IF so, sit for a few minutes before standing.  Have someone help you get up.   If you have nausea (feel sick to your stomach): Drink only clear liquids such as apple juice, ginger ale, broth or 7-Up.  Rest may also help.  Be sure to drink enough fluids.  Move to a regular diet as you feel able.   You may have a slight fever.  Call the doctor if your fever is over 100 F (37.7 C) (taken under the tongue) or lasts longer than 24 hours.  You may have a dry mouth, a sore throat, muscle aches or trouble sleeping. These should go away after 24 hours.  Do not make important or legal decisions.   It is recommended to avoid smoking.               Tips for taking pain medications  To get the best pain relief possible, remember these points:  Take pain medications as directed, before pain becomes severe.  Pain medication can upset your stomach: taking it with food may help.  Constipation is a common side effect of pain medication. Drink plenty of  fluids.  Eat foods high in fiber. Take a stool softener if recommended by your doctor or pharmacist.  Do not drink alcohol, drive or operate machinery while taking pain medications.  Ask about other ways to control pain, such as with heat, ice or relaxation.    Tylenol/Acetaminophen Consumption  To help encourage the safe use of acetaminophen, the makers of TYLENOL  have lowered the maximum daily dose for single-ingredient Extra Strength TYLENOL  (acetaminophen) products sold in the [place] from 8 pills  per day (4,000 mg) to 6 pills per day (3,000 mg). The dosing interval has also changed from 2 pills every 4-6 hours to 2 pills every 6 hours.  If you feel your pain relief is insufficient, you may take Tylenol/Acetaminophen in addition to your narcotic pain medication.   Be careful not to exceed 3,000 mg of Tylenol/Acetaminophen in a 24 hour period from all sources.  If you are taking extra strength Tylenol/acetaminophen (500 mg), the maximum dose is 6 tablets in 24 hours.  If you are taking regular strength acetaminophen (325 mg), the maximum dose is 9 tablets in 24 hours.    Call a doctor for any of the following:  Signs of infection (fever, growing tenderness at the surgery site, a large amount of drainage or bleeding, severe pain, foul-smelling drainage, redness, swelling).  It has been over 8 to 10 hours since surgery and you are still not able to urinate (pass water).  Headache for over 24 hours.  Numbness, tingling or weakness the day after surgery (if you had spinal anesthesia).  Signs of Covid-19 infection (temperature over 100 degrees, shortness of breath, cough, loss of taste/smell, generalized body aches, persistent headache, chills, sore throat, nausea/vomiting/diarrhea)  Your doctor is:  Dr. Luis A Shaikh, Colon Rectal: [phone]                    Or dial [phone] and ask for the resident on call for:  Colon Rectal  For emergency care, call the:  [facility]:  [phone] (TTY for hearing impaired: [phone])

## 2023-03-15 NOTE — OP NOTE
"Colorectal surgery operative note    PREOPERATIVE DIAGNOSIS:  1. Symptomatic mixed hemorrhoids.  2. HLD  3. Overweight status    POSTOPERATIVE DIAGNOSIS:   1. Symptomatic mixed hemorrhoids.  2. HLD  3. Overweight status      PROCEDURE:  1. Evaluation under anesthesia.  2. Closed-hemorrhoidectomy x3.   3. Proctoplasty.  4. Anoplasty.    ANESTHESIA: General endotracheal anesthesia plus local anesthesia.    SURGEON:  Luis A Shaikh MD    ASSISTANT(S): Guanakito Rosado - cory student.    INDICATIONS FOR PROCEDURE  Jena Ibarra is a 67 year old female who presented with symptomatic mixed hemorrhoids. I thoroughly discussed the risks, benefits, and alternatives of operative treatment with the patient and he/she agreed to proceed.    General risks related to anorectal surgery were reviewed with the patient. These include, but are not limited to urinary retention, abscess, infection, bleeding, chronic pain, anal stenosis, fistula, fissure, and fecal incontinence.     OPERATIVE PROCEDURE: After obtaining informed consent, the patient was brought to the operating room and placed in the prone jackknife position. General endotracheal anesthesia was gently induced without difficulty. Bilateral lower extremity pneumatic compression devices were applied and all pressure points were cushioned. The perianal area was then preped and draped in the standard sterile fashion. After a \"time-out\" was performed, a total of 30 mL of Bupivacaine 0.25% mixed with Lidocained 1% with epinephrine was injected as a pudendal block. Digital rectal exam and anoscopy were performed. Findings: Lass rectal mucosa. No lesions. Circumferential hemorrhoids. Location of largest hemorrhoid columns: RA, RP, and LL. These columns were excised by incising the perianal skin with monopolar electrocautery, dissecting the anal sphincter complex off the hemorrhoid tissue with blunt dissection, and then completely excising the hemorrhoid column after clamping across " the pedicle with a clamp. A 2-0 Vicryl suture tied around the clamp. The rectal mucosa, anoderm and perianal skin were re-approximated with a running 3-0 Chromic suture (proctoplasty and anoplasty). The distal rectum and anal canal were irrigated. Hemostasis was achieved and surgicel applied. There was no evidence of anal stenosis or significant residual hemorrhoids. Instrument, sponge, and needle counts were all correct as reported to me. Bacitracin was applied, as well as a sterile dressing. The patient tolerated the procedure well.    COMPLICATIONS: none, immediately.    ESTIMATED BLOOD LOSS: 10 mL.    REPLACEMENT: 700 mL crystalloid.    SPECIMEN(S): hemorrhoids x2.    DRAINS/TUBES: None.    OPERATIVE FINDINGS: Large mixed hemorrhoids (LL, RA, RP)    DISPOSITION: PACU.    Luis A Shaikh MD  Division of Colon & Rectal Surgery  Department of Surgery  [facility]  [phone]

## 2023-03-15 NOTE — ANESTHESIA POSTPROCEDURE EVALUATION
Patient: Jena Ibarra    Procedure: Procedure(s):  EXAM UNDER ANESTHESIA, ANUS, HEMORRHOIDECTOMY x 3       Anesthesia Type:  General    Note:  Disposition: Outpatient   Postop Pain Control: Uneventful            Sign Out: Well controlled pain   PONV: No   Neuro/Psych: Uneventful            Sign Out: Acceptable/Baseline neuro status   Airway/Respiratory: Uneventful            Sign Out: Acceptable/Baseline resp. status   CV/Hemodynamics: Uneventful            Sign Out: Acceptable CV status; No obvious hypovolemia; No obvious fluid overload   Other NRE: NONE   DID A NON-ROUTINE EVENT OCCUR?            Last vitals:  Vitals Value Taken Time   /86 03/15/23 1120   Temp 37.2  C (99  F) 03/15/23 1120   Pulse 73 03/15/23 1120   Resp 14 03/15/23 1120   SpO2 100 % 03/15/23 1120       Electronically Signed By: Jose Miguel Ferrara MD  March 15, 2023  12:53 PM

## 2023-03-15 NOTE — ANESTHESIA CARE TRANSFER NOTE
Patient: Jena Ibarra    Procedure: Procedure(s):  EXAM UNDER ANESTHESIA, ANUS, HEMORRHOIDECTOMY x 3       Diagnosis: Internal hemorrhoid [K64.8]  Diagnosis Additional Information: No value filed.    Anesthesia Type:   General     Note:    Oropharynx: oropharynx clear of all foreign objects and spontaneously breathing  Level of Consciousness: awake  Oxygen Supplementation: face mask  Level of Supplemental Oxygen (L/min / FiO2): 6  Independent Airway: airway patency satisfactory and stable    Vital Signs Stable: post-procedure vital signs reviewed and stable  Report to RN Given: handoff report given  Patient transferred to: PACU  Comments: Uneventful transport   Report to RN - scottie  Exchanging well; color natl  Pt responds appropriately to command  IV patent  Lips/teeth/dentition as preop status  Questions answered    Handoff Report: Identifed the Patient, Identified the Reponsible Provider, Reviewed the pertinent medical history, Discussed the surgical course, Reviewed Intra-OP anesthesia mangement and issues during anesthesia, Set expectations for post-procedure period and Allowed opportunity for questions and acknowledgement of understanding      Vitals:  Vitals Value Taken Time   /84    Temp 97.2    Pulse 81 nsr    Resp 16    SpO2 99%        Electronically Signed By: DEANDRE AREVALO CRNA  March 15, 2023  11:00 AM

## 2023-03-15 NOTE — ANESTHESIA PREPROCEDURE EVALUATION
Anesthesia Pre-Procedure Evaluation    Patient: Jena Ibarra   MRN: [id number] : 1955        Procedure : Procedure(s):  EXAM UNDER ANESTHESIA, ANUS, HEMORRHOIDECTOMY          Past Medical History:   Diagnosis Date     Diverticulosis      History of colon polyps      Hyperlipidemia LDL goal < 160      Osteopenia       Past Surgical History:   Procedure Laterality Date     BIOPSY       CHOLECYSTECTOMY       COLONOSCOPY       COLONOSCOPY N/A 2023    Procedure: COLONOSCOPY;  Surgeon: Oscar Tyler MD;  Location: [facility]     COLONOSCOPY WITH CO2 INSUFFLATION N/A 10/13/2014    Procedure: COLONOSCOPY WITH CO2 INSUFFLATION;  Surgeon: Jamal Beach MD;  Location: [facility]     COLONOSCOPY WITH CO2 INSUFFLATION N/A 2017    Procedure: COLONOSCOPY WITH CO2 INSUFFLATION;  COLON-RECTAL BLEEDING / BARUSYA;  Surgeon: Henok Jewell MD;  Location: [facility]     COMBINED ESOPHAGOSCOPY, GASTROSCOPY, DUODENOSCOPY (EGD) WITH CO2 INSUFFLATION N/A 2019    Procedure: ESOPHAGOGASTRODUODENOSCOPY, WITH CO2 INSUFFLATION;  Surgeon: Jamal Beach MD;  Location: [facility]     ESOPHAGOSCOPY, GASTROSCOPY, DUODENOSCOPY (EGD), COMBINED N/A 2019    Procedure: Esophagogastroduodenoscopy, With Biopsy;  Surgeon: Jamal Beach MD;  Location: [facility]     ESOPHAGOSCOPY, GASTROSCOPY, DUODENOSCOPY (EGD), COMBINED N/A 2019    Procedure: ESOPHAGOGASTRODUODENOSCOPY, WITH ENDOSCOPIC US;  Surgeon: Ric Jaeger MD;  Location: [facility]     EYE SURGERY       TUBAL LIGATION        No Known Allergies   Social History     Tobacco Use     Smoking status: Never     Smokeless tobacco: Never   Substance Use Topics     Alcohol use: Not Currently     Comment: occasional      Wt Readings from Last 1 Encounters:   23 77.1 kg (170 lb)        Anesthesia Evaluation   Pt has had prior anesthetic. Type: General and MAC.    No history of anesthetic complications       ROS/MED HX  ENT/Pulmonary:    (-)  tobacco use and asthma   Neurologic:  - neg neurologic ROS     Cardiovascular:     (+) -----No previous cardiac testing  (-) DANIELSON, orthopnea/PND and irregular heartbeat/palpitations   METS/Exercise Tolerance: >4 METS Comment: Exercises at home and walks daily 2-4 miles daily    Hematologic:  - neg hematologic  ROS     Musculoskeletal:  - neg musculoskeletal ROS     GI/Hepatic: Comment: Chronic pancreatitis - upset stomach occasionally     (+) cholecystitis/cholelithiasis,     Renal/Genitourinary:  - neg Renal ROS     Endo:  - neg endo ROS     Psychiatric/Substance Use:  - neg psychiatric ROS     Infectious Disease:  - neg infectious disease ROS     Malignancy:  - neg malignancy ROS     Other: Comment: Cataracts             Physical Exam    Airway        Mallampati: II       Respiratory Devices and Support         Dental           Cardiovascular          Rhythm and rate: regular     Pulmonary           breath sounds clear to auscultation           OUTSIDE LABS:  CBC:   Lab Results   Component Value Date    WBC 6.6 04/09/2019    WBC 5.0 10/13/2017    HGB 13.6 04/09/2019    HGB 12.2 10/13/2017    HCT 41.1 04/09/2019    HCT 37.0 10/13/2017     04/09/2019     10/13/2017     BMP:   Lab Results   Component Value Date     11/15/2022     05/04/2021    POTASSIUM 5.1 11/15/2022    POTASSIUM 4.6 05/04/2021    CHLORIDE 111 (H) 11/15/2022    CHLORIDE 107 05/04/2021    CO2 30 11/15/2022    CO2 27 05/04/2021    BUN 16 11/15/2022    BUN 11 05/04/2021    CR 0.92 11/15/2022    CR 1.01 05/04/2021     (H) 11/15/2022    GLC 87 05/04/2021     COAGS: No results found for: PTT, INR, FIBR  POC: No results found for: BGM, HCG, HCGS  HEPATIC:   Lab Results   Component Value Date    ALBUMIN 4.0 11/15/2022    PROTTOTAL 7.0 11/15/2022    ALT 28 11/15/2022    AST 12 11/15/2022    ALKPHOS 66 11/15/2022    BILITOTAL 0.5 11/15/2022     OTHER:   Lab Results   Component Value Date    SALEEM 9.5 11/15/2022    LIPASE 40 (L)  05/04/2021    AMYLASE 18 (L) 05/04/2021    TSH 1.60 12/23/2019       Anesthesia Plan    ASA Status:  2   NPO Status:  NPO Appropriate    Anesthesia Type: General.     - Airway: ETT   Induction: Intravenous.   Maintenance: TIVA.        Consents    Anesthesia Plan(s) and associated risks, benefits, and realistic alternatives discussed. Questions answered and patient/representative(s) expressed understanding.    - Discussed:     - Discussed with:  Patient         Postoperative Care    Pain management: Oral pain medications, IV analgesics, Multi-modal analgesia.   PONV prophylaxis: Ondansetron (or other 5HT-3), Dexamethasone or Solumedrol     Comments:                Jose Miguel Ferrara MD

## 2023-03-17 LAB
PATH REPORT.COMMENTS IMP SPEC: NORMAL
PATH REPORT.COMMENTS IMP SPEC: NORMAL
PATH REPORT.FINAL DX SPEC: NORMAL
PATH REPORT.GROSS SPEC: NORMAL
PATH REPORT.MICROSCOPIC SPEC OTHER STN: NORMAL
PATH REPORT.RELEVANT HX SPEC: NORMAL
PHOTO IMAGE: NORMAL

## 2023-03-21 ENCOUNTER — TELEPHONE (OUTPATIENT)
Dept: SURGERY | Facility: CLINIC | Age: 68
End: 2023-03-21
Payer: COMMERCIAL

## 2023-03-28 ENCOUNTER — DOCUMENTATION ONLY (OUTPATIENT)
Dept: OTHER | Facility: CLINIC | Age: 68
End: 2023-03-28
Payer: COMMERCIAL

## 2023-04-02 ENCOUNTER — OFFICE VISIT (OUTPATIENT)
Dept: URGENT CARE | Facility: URGENT CARE | Age: 68
End: 2023-04-02
Payer: COMMERCIAL

## 2023-04-02 ENCOUNTER — ANCILLARY PROCEDURE (OUTPATIENT)
Dept: GENERAL RADIOLOGY | Facility: CLINIC | Age: 68
End: 2023-04-02
Attending: FAMILY MEDICINE
Payer: COMMERCIAL

## 2023-04-02 VITALS
OXYGEN SATURATION: 96 % | SYSTOLIC BLOOD PRESSURE: 146 MMHG | WEIGHT: 176.5 LBS | DIASTOLIC BLOOD PRESSURE: 91 MMHG | BODY MASS INDEX: 29.37 KG/M2 | HEART RATE: 73 BPM | RESPIRATION RATE: 16 BRPM | TEMPERATURE: 98.8 F

## 2023-04-02 DIAGNOSIS — S69.92XA WRIST INJURY, LEFT, INITIAL ENCOUNTER: ICD-10-CM

## 2023-04-02 DIAGNOSIS — S69.92XA WRIST INJURY, LEFT, INITIAL ENCOUNTER: Primary | ICD-10-CM

## 2023-04-02 PROCEDURE — 99213 OFFICE O/P EST LOW 20 MIN: CPT | Performed by: FAMILY MEDICINE

## 2023-04-02 PROCEDURE — 73110 X-RAY EXAM OF WRIST: CPT | Mod: TC | Performed by: RADIOLOGY

## 2023-04-02 ASSESSMENT — PAIN SCALES - GENERAL: PAINLEVEL: NO PAIN (0)

## 2023-04-02 NOTE — PROGRESS NOTES
Assessment & Plan     Wrist injury, left, initial encounter  Differentials discussed in detail, subtle buckling of left distal radial cortex likely related to nondisplaced fracture.  Wrist splint placed for stability and immobilization.  Recommended over-the-counter analgesia and orthopedic referral placed for further review recommendations.  Patient understood and in agreement with above plan.  All questions answered.  - XR Wrist Left G/E 3 Views; Future      Tex Whatley MD  [facility]    Sharan Mcdonald is a 68 year old, presenting for the following health issues:  Urgent Care and Wrist Injury (Fell today hurt left wrist)      HPI     Concern -   Onset: today   Description: Fell on ice accidentally and heard left wrist, left wrist mildly swollen  Intensity: moderate  Progression of Symptoms:  same  Accompanying Signs & Symptoms: none  Therapies tried and outcome: compression, Tylenol    GENERAL: alert and no distress  RESP: no wheezes  MS: Left wrist mildly swollen and tender dorsolaterally, painful wrist movement in all direction, sensation to touch and pressure intact, radial pulses 3+, normal capillary refills.  SKIN: no suspicious lesions or rashes  NEURO: Normal strength and tone, mentation intact and speech normal  PSYCH: mentation appears normal, affect normal/bright    Results for orders placed or performed in visit on 04/02/23   XR Wrist Left G/E 3 Views     Status: None    Narrative    EXAM: XR WRIST LEFT G/E 3 VIEWS  LOCATION: [facility]  DATE/TIME: 4/2/2023 12:25 PM    INDICATION:  Wrist injury, left, initial encounter  COMPARISON: None.      Impression    IMPRESSION: Diffuse soft tissue swelling about the wrist. Subtle buckling of the distal radial metaphyseal cortex worrisome for a nondisplaced fracture. 2 mm ossicle dorsal to the triquetrum consistent with a fracture of indeterminate age. Degenerative   changes in the first CMC joint.

## 2023-04-10 NOTE — PROGRESS NOTES
ASSESSMENT & PLAN    Jena was seen today for injury.    Diagnoses and all orders for this visit:    Wrist injury, left, initial encounter  -     Orthopedic  Referral  -     XR Wrist Left G/E 3 Views; Future  -     Wrist/Arm Supplies Order Wrist Brace; Left; non-thumb spica    Closed Colles' fracture of left radius, initial encounter  -     Wrist/Arm Supplies Order Wrist Brace; Left; non-thumb spica            See Patient Instructions  Patient Instructions   Updated left wrist x-rays today appear similar to the previous x-rays.  Findings are consistent with a nondisplaced distal radius fracture, transverse orientation, best seen on the lateral view.  We reviewed support options for the wrist injury, including wrist bracing, splinting, short arm casting, multiple fracture brace, and custom splinting through hand therapy.  Following discussion, plan wrist brace, provided today.  We discussed routine use, may remove briefly for hygiene, otherwise leave in place.  Plan recheck approximately 3 weeks, with repeat x-rays of the wrist.  Contact clinic for follow-up sooner if needed.    If you have any further questions for your physician or physician s care team you can contact them thru MyChart or by calling  [phone] and use option 3 to leave a voice message.   Messages received during business hours will be returned same day.          Steve Humphreys [facility] TIA      CC: Tex Whatley    -----  Chief Complaint   Patient presents with     Left Wrist - Injury       CLAUDIA Ibarra is a/an 68 year old female who is seen in consultation at the request of  Tex Whatley M.D. for evaluation of Left Wrist Injury. Reports that she fell on the ice.    The patient is seen with their .  The patient is Right handed    Onset: 9 day(s) ago. Patient describes injury as Fall on the ice  Location of Pain: left wrist/hand  Worsened by: twisting  Better  "with: Tylenol, ice, sling, elevation  Treatments tried: rest/activity avoidance, ice, Tylenol and casting/splinting/bracing, sling and elevation  Associated symptoms: swelling    Orthopedic/Surgical history: NO  Social History/Occupation: Retired    **  Some pain ulnar aspect as well.  No N/T.  No previous left wrist injury. Recalls dorsal left wrist ganglion that resolved on its own.          REVIEW OF SYSTEMS:  Review of Systems      OBJECTIVE:  /86   Ht 1.651 m (5' 5\")   Wt 79.8 kg (176 lb)   BMI 29.29 kg/m           Hand/wrist (left):    Inspection:  No deformity noted.  Mild diffuse wrist swelling.    Motion:  Forearm pronation full, forearm supination lacking few deg.  Wrist flexion mild limitation  Wrist extension mild limitation  Digit motion grossly intact    Strength:  deferred    Sensation:  Grossly intact light touch.    Radial pulses normal, +2/4, capillary refill brisk.    Palpation:  Tender distal radius, mild ulnar fovea also      RADIOLOGY:  I independently ordered, visualized and reviewed these images with the patient  Distal radius fracture, with subtle dorsal cortex irregularity best seen on lateral view.  There also appears to be subtle cortical disruption on the volar aspect distal radial metaphysis, noted on original x-ray and updated reviewed today.  Small calcification previously noted dorsal carpals, may represent age-indeterminate triquetral avulsion, better seen on previous x-ray.      Recent Results (from the past 24 hour(s))   XR Wrist Left G/E 3 Views    Narrative    XR WRIST LEFT G/E 3 VIEWS 4/11/2023 8:29 AM     HISTORY: Wrist injury. Fracture follow-up.    COMPARISON: 4/2/2023.       Impression    IMPRESSION:    Ongoing incomplete healing of the nondisplaced minimally impacted  distal radius fracture. Redemonstrated small ossific fragment at the  dorsal aspect of the wrist, likely representing prior triquetral  fracture. Otherwise unchanged.    CONCHA YADAV MD       "   SYSTEM ID:  [id number]           Previous x-rays:        Narrative & Impression   EXAM: XR WRIST LEFT G/E 3 VIEWS  LOCATION: [facility]  DATE/TIME: 4/2/2023 12:25 PM     INDICATION:  Wrist injury, left, initial encounter  COMPARISON: None.                                                                      IMPRESSION: Diffuse soft tissue swelling about the wrist. Subtle buckling of the distal radial metaphyseal cortex worrisome for a nondisplaced fracture. 2 mm ossicle dorsal to the triquetrum consistent with a fracture of indeterminate age. Degenerative   changes in the first CMC joint.         Review of prior external note(s) from - previous evaluation  Review of the result(s) of each unique test - imaging  Independent interpretation of a test performed by another physician/other qualified health care professional (not separately reported) - imaging  Ordering of each unique test

## 2023-04-11 ENCOUNTER — ANCILLARY PROCEDURE (OUTPATIENT)
Dept: GENERAL RADIOLOGY | Facility: CLINIC | Age: 68
End: 2023-04-11
Attending: PEDIATRICS
Payer: COMMERCIAL

## 2023-04-11 ENCOUNTER — OFFICE VISIT (OUTPATIENT)
Dept: ORTHOPEDICS | Facility: CLINIC | Age: 68
End: 2023-04-11
Attending: FAMILY MEDICINE
Payer: COMMERCIAL

## 2023-04-11 VITALS
DIASTOLIC BLOOD PRESSURE: 86 MMHG | WEIGHT: 176 LBS | BODY MASS INDEX: 29.32 KG/M2 | SYSTOLIC BLOOD PRESSURE: 132 MMHG | HEIGHT: 65 IN

## 2023-04-11 DIAGNOSIS — S69.92XA WRIST INJURY, LEFT, INITIAL ENCOUNTER: ICD-10-CM

## 2023-04-11 DIAGNOSIS — S69.92XA WRIST INJURY, LEFT, INITIAL ENCOUNTER: Primary | ICD-10-CM

## 2023-04-11 DIAGNOSIS — S52.532A CLOSED COLLES' FRACTURE OF LEFT RADIUS, INITIAL ENCOUNTER: ICD-10-CM

## 2023-04-11 PROCEDURE — 99204 OFFICE O/P NEW MOD 45 MIN: CPT | Mod: 57 | Performed by: PEDIATRICS

## 2023-04-11 PROCEDURE — 25600 CLTX DST RDL FX/EPHYS SEP WO: CPT | Mod: LT | Performed by: PEDIATRICS

## 2023-04-11 PROCEDURE — 73110 X-RAY EXAM OF WRIST: CPT | Mod: TC | Performed by: STUDENT IN AN ORGANIZED HEALTH CARE EDUCATION/TRAINING PROGRAM

## 2023-04-11 NOTE — PATIENT INSTRUCTIONS
Updated left wrist x-rays today appear similar to the previous x-rays.  Findings are consistent with a nondisplaced distal radius fracture, transverse orientation, best seen on the lateral view.  We reviewed support options for the wrist injury, including wrist bracing, splinting, short arm casting, multiple fracture brace, and custom splinting through hand therapy.  Following discussion, plan wrist brace, provided today.  We discussed routine use, may remove briefly for hygiene, otherwise leave in place.  Plan recheck approximately 3 weeks, with repeat x-rays of the wrist.  Contact clinic for follow-up sooner if needed.    If you have any further questions for your physician or physician s care team you can contact them thru [employer] or by calling  [phone] and use option 3 to leave a voice message.   Messages received during business hours will be returned same day.

## 2023-04-11 NOTE — LETTER
4/11/2023         RE: Jena Ibarra  [address] [place] [address]        Dear Colleague,    Thank you for referring your patient, Jena Ibarra, to the [facility]. Please see a copy of my visit note below.    ASSESSMENT & PLAN    Jena was seen today for injury.    Diagnoses and all orders for this visit:    Wrist injury, left, initial encounter  -     Orthopedic  Referral  -     XR Wrist Left G/E 3 Views; Future  -     Wrist/Arm Supplies Order Wrist Brace; Left; non-thumb spica    Closed Colles' fracture of left radius, initial encounter  -     Wrist/Arm Supplies Order Wrist Brace; Left; non-thumb spica            See Patient Instructions  Patient Instructions   Updated left wrist x-rays today appear similar to the previous x-rays.  Findings are consistent with a nondisplaced distal radius fracture, transverse orientation, best seen on the lateral view.  We reviewed support options for the wrist injury, including wrist bracing, splinting, short arm casting, multiple fracture brace, and custom splinting through hand therapy.  Following discussion, plan wrist brace, provided today.  We discussed routine use, may remove briefly for hygiene, otherwise leave in place.  Plan recheck approximately 3 weeks, with repeat x-rays of the wrist.  Contact clinic for follow-up sooner if needed.    If you have any further questions for your physician or physician s care team you can contact them thru MyChart or by calling  [phone] and use option 3 to leave a voice message.   Messages received during business hours will be returned same day.          Steve Humphreys DO  [facility]      CC: Tex Whatley    -----  Chief Complaint   Patient presents with     Left Wrist - Injury       SUBJECTIVE  Jena Ibarra is a/an 68 year old female who is seen in consultation at the request of  Tex Whatley M.D. for evaluation of  "Left Wrist Injury. Reports that she fell on the ice.    The patient is seen with their .  The patient is Right handed    Onset: 9 day(s) ago. Patient describes injury as Fall on the ice  Location of Pain: left wrist/hand  Worsened by: twisting  Better with: Tylenol, ice, sling, elevation  Treatments tried: rest/activity avoidance, ice, Tylenol and casting/splinting/bracing, sling and elevation  Associated symptoms: swelling    Orthopedic/Surgical history: NO  Social History/Occupation: Retired    **  Some pain ulnar aspect as well.  No N/T.  No previous left wrist injury. Recalls dorsal left wrist ganglion that resolved on its own.          REVIEW OF SYSTEMS:  Review of Systems      OBJECTIVE:  /86   Ht 1.651 m (5' 5\")   Wt 79.8 kg (176 lb)   BMI 29.29 kg/m           Hand/wrist (left):    Inspection:  No deformity noted.  Mild diffuse wrist swelling.    Motion:  Forearm pronation full, forearm supination lacking few deg.  Wrist flexion mild limitation  Wrist extension mild limitation  Digit motion grossly intact    Strength:  deferred    Sensation:  Grossly intact light touch.    Radial pulses normal, +2/4, capillary refill brisk.    Palpation:  Tender distal radius, mild ulnar fovea also      RADIOLOGY:  I independently ordered, visualized and reviewed these images with the patient  Distal radius fracture, with subtle dorsal cortex irregularity best seen on lateral view.  There also appears to be subtle cortical disruption on the volar aspect distal radial metaphysis, noted on original x-ray and updated reviewed today.  Small calcification previously noted dorsal carpals, may represent age-indeterminate triquetral avulsion, better seen on previous x-ray.      Recent Results (from the past 24 hour(s))   XR Wrist Left G/E 3 Views    Narrative    XR WRIST LEFT G/E 3 VIEWS 4/11/2023 8:29 AM     HISTORY: Wrist injury. Fracture follow-up.    COMPARISON: 4/2/2023.       Impression    IMPRESSION:    Ongoing " incomplete healing of the nondisplaced minimally impacted  distal radius fracture. Redemonstrated small ossific fragment at the  dorsal aspect of the wrist, likely representing prior triquetral  fracture. Otherwise unchanged.    CONCHA YADAV MD         SYSTEM ID:  [id number]           Previous x-rays:        Narrative & Impression   EXAM: XR WRIST LEFT G/E 3 VIEWS  LOCATION: [facility]  DATE/TIME: 4/2/2023 12:25 PM     INDICATION:  Wrist injury, left, initial encounter  COMPARISON: None.                                                                      IMPRESSION: Diffuse soft tissue swelling about the wrist. Subtle buckling of the distal radial metaphyseal cortex worrisome for a nondisplaced fracture. 2 mm ossicle dorsal to the triquetrum consistent with a fracture of indeterminate age. Degenerative   changes in the first CMC joint.         Review of prior external note(s) from - previous evaluation  Review of the result(s) of each unique test - imaging  Independent interpretation of a test performed by another physician/other qualified health care professional (not separately reported) - imaging  Ordering of each unique test          Again, thank you for allowing me to participate in the care of your patient.        Sincerely,        Steve Humphreys,

## 2023-04-12 ENCOUNTER — OFFICE VISIT (OUTPATIENT)
Dept: SURGERY | Facility: CLINIC | Age: 68
End: 2023-04-12
Payer: COMMERCIAL

## 2023-04-12 VITALS
HEIGHT: 65 IN | DIASTOLIC BLOOD PRESSURE: 77 MMHG | OXYGEN SATURATION: 98 % | HEART RATE: 73 BPM | BODY MASS INDEX: 28.96 KG/M2 | SYSTOLIC BLOOD PRESSURE: 116 MMHG | WEIGHT: 173.8 LBS

## 2023-04-12 DIAGNOSIS — Z09 FOLLOW-UP EXAMINATION AFTER COLORECTAL SURGERY: Primary | ICD-10-CM

## 2023-04-12 PROCEDURE — 99024 POSTOP FOLLOW-UP VISIT: CPT | Performed by: NURSE PRACTITIONER

## 2023-04-12 ASSESSMENT — PAIN SCALES - GENERAL: PAINLEVEL: NO PAIN (0)

## 2023-04-12 NOTE — PROGRESS NOTES
"Colon and Rectal Surgery Postoperative Clinic Note    RE: Jena Ibarra  : 1955  MIKE: 2023    Jena Ibarra is a very pleasant 68 year old female s/p examination under anesthesia with closed hemorrhoidectomy X3, proctoplasty, and anoplasty on 3/15/23 with Dr. Shaikh.     Interval history: Tamara has been doing well. No current pain and she never needed any narcotics after surgery. Bleeding stopped recently. No difficulty with bowel movements and they are soft.    Physical Examination: Exam was chaperoned by Uriel Hyatt, EMT-P   /77 (BP Location: Right arm, Patient Position: Sitting, Cuff Size: Adult Regular)   Pulse 73   Ht 5' 5\"   Wt 173 lb 12.8 oz   SpO2 98%   BMI 28.92 kg/m    General: alert, oriented, in no acute distress, sitting comfortably  HEENT: mucous membranes moist  Perianal external examination:  Surgical sites healing well without erythema or drainage. Mild edema present.    Digital rectal examination: Was deferred.    Anoscopy: Was deferred.    Assessment/Plan:  68 year old female s/p examination under anesthesia with closed hemorrhoidectomy X3, proctoplasty, and anoplasty on 3/15/23 with Dr. Shaikh. She is recovering well. Surgical sites healing well without signs of infection. Discussed the importance of long term management of constipation and advised a daily fiber supplement. Follow up as needed.    Medical history:  Past Medical History:   Diagnosis Date     Diverticulosis      History of colon polyps      Hyperlipidemia LDL goal < 160      Osteopenia        Surgical history:  Past Surgical History:   Procedure Laterality Date     BIOPSY       CHOLECYSTECTOMY       COLONOSCOPY       COLONOSCOPY N/A 2023    Procedure: COLONOSCOPY;  Surgeon: Oscar Tyelr MD;  [facility]: [facility]     COLONOSCOPY WITH CO2 INSUFFLATION N/A 10/13/2014    Procedure: COLONOSCOPY WITH CO2 INSUFFLATION;  Surgeon: aJmal Beach MD;  Location: [facility]     COLONOSCOPY WITH CO2 " INSUFFLATION N/A 11/14/2017    Procedure: COLONOSCOPY WITH CO2 INSUFFLATION;  COLON-RECTAL BLEEDING / BARUSYA;  Surgeon: Henok Jewell MD;  Location: [facility]     COMBINED ESOPHAGOSCOPY, GASTROSCOPY, DUODENOSCOPY (EGD) WITH CO2 INSUFFLATION N/A 7/25/2019    Procedure: ESOPHAGOGASTRODUODENOSCOPY, WITH CO2 INSUFFLATION;  Surgeon: Jamal Beach MD;  Location: [facility]     ESOPHAGOSCOPY, GASTROSCOPY, DUODENOSCOPY (EGD), COMBINED N/A 7/25/2019    Procedure: Esophagogastroduodenoscopy, With Biopsy;  Surgeon: Jamal Beach MD;  Location: [facility]     ESOPHAGOSCOPY, GASTROSCOPY, DUODENOSCOPY (EGD), COMBINED N/A 9/18/2019    Procedure: ESOPHAGOGASTRODUODENOSCOPY, WITH ENDOSCOPIC US;  Surgeon: Ric Jaeger MD;  Location: [facility]     EYE SURGERY       HEMORRHOIDECTOMY INTERNAL N/A 3/15/2023    Procedure: EXAM UNDER ANESTHESIA, ANUS, HEMORRHOIDECTOMY x 3;  Surgeon: Luis A Shaikh MD;  Location: [facility] OR     TUBAL LIGATION         Problem list:  Patient Active Problem List    Diagnosis Date Noted     Internal hemorrhoid 03/08/2023     Priority: Medium     Added automatically from request for surgery [id number]       Chronic pancreatitis, unspecified pancreatitis type (H) 11/15/2022     Priority: Medium     S/P laparoscopic cholecystectomy 10/15/2019     Priority: Medium     Gallstones 09/03/2019     Priority: Medium     Abnormal biliary HIDA scan 09/03/2019     Priority: Medium     Atrophic vaginitis 12/01/2014     Priority: Medium     Radial styloid tenosynovitis 04/18/2013     Priority: Medium     CTS (carpal tunnel syndrome) 04/18/2013     Priority: Medium     CARDIOVASCULAR SCREENING; LDL GOAL LESS THAN 160 10/31/2010     Priority: Medium       Medications:  Current Outpatient Medications   Medication Sig Dispense Refill     Carboxymethylcellul-Glycerin (REFRESH OPTIVE) 1-0.9 % GEL        cetirizine (ZYRTEC) 10 MG tablet Take 10 mg by mouth daily as needed for allergies       [facility]op[facility]adine HCl  "([facility])  (Patient not taking: Reported on 3/14/2023)       oxyCODONE (ROXICODONE) 5 MG tablet Take 1-2 tablets (5-10 mg) by mouth every 6 hours as needed for severe pain (7-10) 10 tablet 0       Allergies:  No Known Allergies    Family history:  Family History   Problem Relation Age of Onset     Cancer Mother         kidney     Diabetes Mother      Heart Disease Mother      Lipids Mother      Hypertension Mother      Colon Polyps Mother          benign     Hyperlipidemia Mother      Other Cancer Mother         Kidney     Osteoporosis Mother      Obesity Mother      Arthritis Father      Lipids Father      Hypertension Father      Osteoporosis Father      Hyperlipidemia Father      Prostate Cancer Father      Anxiety Disorder Father      Mental Illness Father      Arthritis Sister      Neurologic Disorder Sister         seizures     Obesity Sister      Heart Disease Sister      Cancer Brother         pancreatic     Coronary Artery Disease Brother      Obesity Brother      Other Cancer Brother         Pancreas     Thyroid Disease Son      Obesity Sister      Breast Cancer No family hx of        Social history:  Social History     Tobacco Use     Smoking status: Never     Smokeless tobacco: Never   Vaping Use     Vaping status: Never Used   Substance Use Topics     Alcohol use: Not Currently     Comment: occasional     Marital status: .    Nursing Notes:   Uriel Hyatt EMT  4/12/2023  7:12 AM  Signed  Chief Complaint   Patient presents with     Surgical Followup     DOS 3/15       Vitals:    04/12/23 0709   BP: 116/77   BP Location: Right arm   Patient Position: Sitting   Cuff Size: Adult Regular   Pulse: 73   SpO2: 98%   Weight: 173 lb 12.8 oz   Height: 5' 5\"       Body mass index is 28.92 kg/m .     Uriel Hyatt, EMT- P         15 minutes spent on the date of the encounter doing chart review, history and exam, documentation and further activities as noted above.   This is a postop visit.    Kelley" DEANDRE Blackwell, NP-C  Colon and Rectal Surgery  [facility]    This note was created using speech recognition software and may contain unintended word substitutions.

## 2023-04-12 NOTE — NURSING NOTE
"Chief Complaint   Patient presents with     Surgical Followup     DOS 3/15       Vitals:    04/12/23 0709   BP: 116/77   BP Location: Right arm   Patient Position: Sitting   Cuff Size: Adult Regular   Pulse: 73   SpO2: 98%   Weight: 173 lb 12.8 oz   Height: 5' 5\"       Body mass index is 28.92 kg/m .     Uriel Hyatt, EMT- P    "

## 2023-04-12 NOTE — LETTER
"2023       RE: Jena Ibarra  [address] [place] [address]       Dear Colleague,    Thank you for referring your patient, Jena Ibarra, to the [facility] [place] [facility]. Please see a copy of my visit note below.    Colon and Rectal Surgery Postoperative Clinic Note    RE: Jena Ibarra  : 1955  MIKE: 2023    Jena Ibarra is a very pleasant 68 year old female s/p examination under anesthesia with closed hemorrhoidectomy X3, proctoplasty, and anoplasty on 3/15/23 with Dr. Shaikh.     Interval history: Tamara has been doing well. No current pain and she never needed any narcotics after surgery. Bleeding stopped recently. No difficulty with bowel movements and they are soft.    Physical Examination: Exam was chaperoned by Uriel Hyatt, EMT-P   /77 (BP Location: Right arm, Patient Position: Sitting, Cuff Size: Adult Regular)   Pulse 73   Ht 5' 5\"   Wt 173 lb 12.8 oz   SpO2 98%   BMI 28.92 kg/m    General: alert, oriented, in no acute distress, sitting comfortably  HEENT: mucous membranes moist  Perianal external examination:  Surgical sites healing well without erythema or drainage. Mild edema present.    Digital rectal examination: Was deferred.    Anoscopy: Was deferred.    Assessment/Plan:  68 year old female s/p examination under anesthesia with closed hemorrhoidectomy X3, proctoplasty, and anoplasty on 3/15/23 with Dr. Shaikh. She is recovering well. Surgical sites healing well without signs of infection. Discussed the importance of long term management of constipation and advised a daily fiber supplement. Follow up as needed.    Medical history:  Past Medical History:   Diagnosis Date    Diverticulosis     History of colon polyps     Hyperlipidemia LDL goal < 160     Osteopenia        Surgical history:  Past Surgical History:   Procedure Laterality Date    BIOPSY      " CHOLECYSTECTOMY      COLONOSCOPY      COLONOSCOPY N/A 1/31/2023    Procedure: COLONOSCOPY;  Surgeon: Oscar Tyler MD;  [facility]: [facility]    COLONOSCOPY WITH CO2 INSUFFLATION N/A 10/13/2014    Procedure: COLONOSCOPY WITH CO2 INSUFFLATION;  Surgeon: Jamal Beach MD;  [facility]    COLONOSCOPY WITH CO2 INSUFFLATION N/A 11/14/2017    Procedure: COLONOSCOPY WITH CO2 INSUFFLATION;  COLON-RECTAL BLEEDING / BARUSYA;  Surgeon: Henok Jewell MD;  [facility]    COMBINED ESOPHAGOSCOPY, GASTROSCOPY, DUODENOSCOPY (EGD) WITH CO2 INSUFFLATION N/A 7/25/2019    Procedure: ESOPHAGOGASTRODUODENOSCOPY, WITH CO2 INSUFFLATION;  Surgeon: Jamal Beach MD;  [facility]    ESOPHAGOSCOPY, GASTROSCOPY, DUODENOSCOPY (EGD), COMBINED N/A 7/25/2019    Procedure: Esophagogastroduodenoscopy, With Biopsy;  Surgeon: Jamal Beach MD;  [facility]: [facility]    ESOPHAGOSCOPY, GASTROSCOPY, DUODENOSCOPY (EGD), COMBINED N/A 9/18/2019    Procedure: ESOPHAGOGASTRODUODENOSCOPY, WITH ENDOSCOPIC US;  Surgeon: Ric Jaeger MD;  Location: [facility]    EYE SURGERY      HEMORRHOIDECTOMY INTERNAL N/A 3/15/2023    Procedure: EXAM UNDER ANESTHESIA, ANUS, HEMORRHOIDECTOMY x 3;  Surgeon: Luis A Shaikh MD;  Location: [facility] OR    TUBAL LIGATION         Problem list:  Patient Active Problem List    Diagnosis Date Noted    Internal hemorrhoid 03/08/2023     Priority: Medium     Added automatically from request for surgery [id number]      Chronic pancreatitis, unspecified pancreatitis type (H) 11/15/2022     Priority: Medium    S/P laparoscopic cholecystectomy 10/15/2019     Priority: Medium    Gallstones 09/03/2019     Priority: Medium    Abnormal biliary HIDA scan 09/03/2019     Priority: Medium    Atrophic vaginitis 12/01/2014     Priority: Medium    Radial styloid tenosynovitis 04/18/2013     Priority: Medium    CTS (carpal tunnel syndrome) 04/18/2013     Priority: Medium    CARDIOVASCULAR SCREENING; LDL GOAL  LESS THAN 160 10/31/2010     Priority: Medium       Medications:  Current Outpatient Medications   Medication Sig Dispense Refill    Carboxymethylcellul-Glycerin (REFRESH OPTIVE) 1-0.9 % GEL       cetirizine (ZYRTEC) 10 MG tablet Take 10 mg by mouth daily as needed for allergies      Olopatadine HCl ([facility] OP)  (Patient not taking: Reported on 3/14/2023)      oxyCODONE (ROXICODONE) 5 MG tablet Take 1-2 tablets (5-10 mg) by mouth every 6 hours as needed for severe pain (7-10) 10 tablet 0       Allergies:  No Known Allergies    Family history:  Family History   Problem Relation Age of Onset    Cancer Mother         kidney    Diabetes Mother     Heart Disease Mother     Lipids Mother     Hypertension Mother     Colon Polyps Mother          benign    Hyperlipidemia Mother     Other Cancer Mother         Kidney    Osteoporosis Mother     Obesity Mother     Arthritis Father     Lipids Father     Hypertension Father     Osteoporosis Father     Hyperlipidemia Father     Prostate Cancer Father     Anxiety Disorder Father     Mental Illness Father     Arthritis Sister     Neurologic Disorder Sister         seizures    Obesity Sister     Heart Disease Sister     Cancer Brother         pancreatic    Coronary Artery Disease Brother     Obesity Brother     Other Cancer Brother         Pancreas    Thyroid Disease Son     Obesity Sister     Breast Cancer No family hx of        Social history:  Social History     Tobacco Use    Smoking status: Never    Smokeless tobacco: Never   Vaping Use    Vaping status: Never Used   Substance Use Topics    Alcohol use: Not Currently     Comment: occasional     Marital status: .    Nursing Notes:   Uriel Hyatt, EMT  4/12/2023  7:12 AM  Signed  Chief Complaint   Patient presents with    Surgical Followup     DOS 3/15       Vitals:    04/12/23 0709   BP: 116/77   BP Location: Right arm   Patient Position: Sitting   Cuff Size: Adult Regular   Pulse: 73   SpO2: 98%   Weight: 173 lb 12.8  "oz   Height: 5' 5\"       Body mass index is 28.92 kg/m .     Uriel Hyatt, EMT- P    15 minutes spent on the date of the encounter doing chart review, history and exam, documentation and further activities as noted above.   This is a postop visit.      This note was created using speech recognition software and may contain unintended word substitutions.          Again, thank you for allowing me to participate in the care of your patient.      Sincerely,    DEANDRE Alves CNP      "

## 2023-05-02 ENCOUNTER — ANCILLARY PROCEDURE (OUTPATIENT)
Dept: GENERAL RADIOLOGY | Facility: CLINIC | Age: 68
End: 2023-05-02
Attending: PEDIATRICS
Payer: COMMERCIAL

## 2023-05-02 ENCOUNTER — OFFICE VISIT (OUTPATIENT)
Dept: ORTHOPEDICS | Facility: CLINIC | Age: 68
End: 2023-05-02
Payer: COMMERCIAL

## 2023-05-02 VITALS
WEIGHT: 173 LBS | HEIGHT: 66 IN | DIASTOLIC BLOOD PRESSURE: 87 MMHG | BODY MASS INDEX: 27.8 KG/M2 | SYSTOLIC BLOOD PRESSURE: 138 MMHG

## 2023-05-02 DIAGNOSIS — S52.532D CLOSED COLLES' FRACTURE OF LEFT RADIUS WITH ROUTINE HEALING, SUBSEQUENT ENCOUNTER: Primary | ICD-10-CM

## 2023-05-02 DIAGNOSIS — S69.92XA WRIST INJURY, LEFT, INITIAL ENCOUNTER: ICD-10-CM

## 2023-05-02 PROCEDURE — 99207 PR FRACTURE CARE IN GLOBAL PERIOD: CPT | Performed by: PEDIATRICS

## 2023-05-02 PROCEDURE — 73110 X-RAY EXAM OF WRIST: CPT | Mod: TC | Performed by: RADIOLOGY

## 2023-05-02 NOTE — PATIENT INSTRUCTIONS
Updated x-rays of the left wrist today demonstrate healing of the distal radius fracture.  We discussed weaning the brace.  Okay to discontinue overnight use today.  Otherwise, we discussed starting to wean the brace, with 30 to 60 minutes at a time, adding the same amount of time daily over several days.  When spending at least several hours out of the brace, you may discontinue entirely.  Certainly continue to wear the brace for any significant physical activities, or if desiring additional support, for example while dog walking, as we discussed.  Home exercises reviewed today. Contact clinic if interested in seeing hand therapy.  Monitor over the next 2 weeks to begin.  If continued improvement during that time, then follow-up is open-ended.    If you have any further questions for your physician or physician s care team you can contact them thru [employer] or by calling  [phone] and use option 3 to leave a voice message.   Messages received during business hours will be returned same day.

## 2023-05-02 NOTE — LETTER
5/2/2023         RE: Jena Ibarra  [address] [place] [address]        Dear Colleague,    Thank you for referring your patient, Jena Ibarra, to the [facility] [place]. Please see a copy of my visit note below.    ASSESSMENT & PLAN    Diagnoses and all orders for this visit:    Closed Colles' fracture of left radius with routine healing, subsequent encounter  -     XR Wrist Left G/E 3 Views; Future          See Patient Instructions  Patient Instructions   Updated x-rays of the left wrist today demonstrate healing of the distal radius fracture.  We discussed weaning the brace.  Okay to discontinue overnight use today.  Otherwise, we discussed starting to wean the brace, with 30 to 60 minutes at a time, adding the same amount of time daily over several days.  When spending at least several hours out of the brace, you may discontinue entirely.  Certainly continue to wear the brace for any significant physical activities, or if desiring additional support, for example while dog walking, as we discussed.  Home exercises reviewed today. Contact clinic if interested in seeing hand therapy.  Monitor over the next 2 weeks to begin.  If continued improvement during that time, then follow-up is open-ended.    If you have any further questions for your physician or physician s care team you can contact them thru MyChart or by calling  [phone] and use option 3 to leave a voice message.   Messages received during business hours will be returned same day.          Steve Humphreys,   [facility]    SUBJECTIVE- Interim History May 2, 2023    No chief complaint on file.      Jena Ibarra is a 68 year old female who is seen in f/u up for Closed Colles' fracture of left radius with routine healing, subsequent encounter. Since last visit on 4/11/23 patient has reports that when she takes the brace off, her wrist is stiff, but it relaxes  "after a while. Reports only taking it off when showering. Reports that she feels like the wrist is improving, less pain overall  - Now ~ 1 month from initial onset    The patient is Right handed       **      REVIEW OF SYSTEMS:  Review of Systems      OBJECTIVE:  /87   Ht 1.683 m (5' 6.25\")   Wt 78.5 kg (173 lb)   BMI 27.71 kg/m       Mild limitation end range flexion, extension, with stiffness, no pain  Nontender at wrist.      RADIOLOGY:  Final results and radiologist's interpretation, available in the [facility] health record.  Images were reviewed with the patient in the office today.  My personal interpretation of the performed imaging: Interval healing of the previously noted distal radius fracture, stable.  There is sclerosis along the fracture line.      Recent Results (from the past 24 hour(s))   XR Wrist Left G/E 3 Views    Narrative    XR WRIST LEFT G/E 3 VIEWS   5/2/2023 8:04 AM     HISTORY: Wrist injury, left, initial encounter  COMPARISON: 4/11/2023       Impression    IMPRESSION: The nondisplaced distal radius fracture demonstrates  increased sclerosis compatible with ongoing healing. Otherwise  unchanged.    DONN BHANDARI MD         SYSTEM ID:  [id number]                 Again, thank you for allowing me to participate in the care of your patient.        Sincerely,        Steve Humphreys, DO    "

## 2023-05-02 NOTE — PROGRESS NOTES
ASSESSMENT & PLAN    Diagnoses and all orders for this visit:    Closed Colles' fracture of left radius with routine healing, subsequent encounter  -     XR Wrist Left G/E 3 Views; Future          See Patient Instructions  Patient Instructions   Updated x-rays of the left wrist today demonstrate healing of the distal radius fracture.  We discussed weaning the brace.  Okay to discontinue overnight use today.  Otherwise, we discussed starting to wean the brace, with 30 to 60 minutes at a time, adding the same amount of time daily over several days.  When spending at least several hours out of the brace, you may discontinue entirely.  Certainly continue to wear the brace for any significant physical activities, or if desiring additional support, for example while dog walking, as we discussed.  Home exercises reviewed today. Contact clinic if interested in seeing hand therapy.  Monitor over the next 2 weeks to begin.  If continued improvement during that time, then follow-up is open-ended.    If you have any further questions for your physician or physician s care team you can contact them thru MyChart or by calling  [phone] and use option 3 to leave a voice message.   Messages received during business hours will be returned same day.          Steve Humphreys, [facility] TIA    SUBJECTIVE- Interim History May 2, 2023    No chief complaint on file.      Jena Ibarra is a 68 year old female who is seen in f/u up for Closed Colles' fracture of left radius with routine healing, subsequent encounter. Since last visit on 4/11/23 patient has reports that when she takes the brace off, her wrist is stiff, but it relaxes after a while. Reports only taking it off when showering. Reports that she feels like the wrist is improving, less pain overall  - Now ~ 1 month from initial onset    The patient is Right handed       **      REVIEW OF SYSTEMS:  Review of  "Systems      OBJECTIVE:  /87   Ht 1.683 m (5' 6.25\")   Wt 78.5 kg (173 lb)   BMI 27.71 kg/m       Mild limitation end range flexion, extension, with stiffness, no pain  Nontender at wrist.      RADIOLOGY:  Final results and radiologist's interpretation, available in the [facility] health record.  Images were reviewed with the patient in the office today.  My personal interpretation of the performed imaging: Interval healing of the previously noted distal radius fracture, stable.  There is sclerosis along the fracture line.      Recent Results (from the past 24 hour(s))   XR Wrist Left G/E 3 Views    Narrative    XR WRIST LEFT G/E 3 VIEWS   5/2/2023 8:04 AM     HISTORY: Wrist injury, left, initial encounter  COMPARISON: 4/11/2023       Impression    IMPRESSION: The nondisplaced distal radius fracture demonstrates  increased sclerosis compatible with ongoing healing. Otherwise  unchanged.    DONN BHANDARI MD         SYSTEM ID:  [id number]             "

## 2023-10-16 ENCOUNTER — PATIENT OUTREACH (OUTPATIENT)
Dept: CARE COORDINATION | Facility: CLINIC | Age: 68
End: 2023-10-16
Payer: COMMERCIAL

## 2023-10-30 ENCOUNTER — PATIENT OUTREACH (OUTPATIENT)
Dept: CARE COORDINATION | Facility: CLINIC | Age: 68
End: 2023-10-30
Payer: COMMERCIAL

## 2023-12-01 ENCOUNTER — HOSPITAL ENCOUNTER (OUTPATIENT)
Dept: MAMMOGRAPHY | Facility: CLINIC | Age: 68
Discharge: HOME | End: 2023-12-01
Attending: PHYSICIAN ASSISTANT | Admitting: PHYSICIAN ASSISTANT
Payer: COMMERCIAL

## 2023-12-01 DIAGNOSIS — Z12.31 VISIT FOR SCREENING MAMMOGRAM: ICD-10-CM

## 2023-12-01 PROCEDURE — 77067 SCR MAMMO BI INCL CAD: CPT

## 2023-12-01 ASSESSMENT — ENCOUNTER SYMPTOMS
BREAST MASS: 0
SHORTNESS OF BREATH: 0
MYALGIAS: 0
JOINT SWELLING: 0
SORE THROAT: 0
EYE PAIN: 0
PALPITATIONS: 0
HEADACHES: 0
HEMATOCHEZIA: 0
CHILLS: 0
NAUSEA: 0
ABDOMINAL PAIN: 0
HEMATURIA: 0
DIZZINESS: 0
DYSURIA: 0
CONSTIPATION: 0
ARTHRALGIAS: 0
HEARTBURN: 0
COUGH: 0
DIARRHEA: 0
FREQUENCY: 0
PARESTHESIAS: 0
WEAKNESS: 0
FEVER: 0
NERVOUS/ANXIOUS: 0

## 2023-12-01 ASSESSMENT — ACTIVITIES OF DAILY LIVING (ADL): CURRENT_FUNCTION: NO ASSISTANCE NEEDED

## 2023-12-04 ENCOUNTER — OFFICE VISIT (OUTPATIENT)
Dept: FAMILY MEDICINE | Facility: CLINIC | Age: 68
End: 2023-12-04
Payer: COMMERCIAL

## 2023-12-04 VITALS
HEART RATE: 95 BPM | SYSTOLIC BLOOD PRESSURE: 128 MMHG | OXYGEN SATURATION: 100 % | BODY MASS INDEX: 28.7 KG/M2 | HEIGHT: 66 IN | DIASTOLIC BLOOD PRESSURE: 84 MMHG | TEMPERATURE: 97.7 F | RESPIRATION RATE: 20 BRPM | WEIGHT: 178.6 LBS

## 2023-12-04 DIAGNOSIS — Z00.00 ENCOUNTER FOR ANNUAL WELLNESS EXAM IN MEDICARE PATIENT: Primary | ICD-10-CM

## 2023-12-04 DIAGNOSIS — Z00.00 ENCOUNTER FOR MEDICARE ANNUAL WELLNESS EXAM: ICD-10-CM

## 2023-12-04 DIAGNOSIS — Z13.220 LIPID SCREENING: ICD-10-CM

## 2023-12-04 DIAGNOSIS — E78.5 HYPERLIPIDEMIA LDL GOAL <130: ICD-10-CM

## 2023-12-04 PROBLEM — K86.1 CHRONIC PANCREATITIS, UNSPECIFIED PANCREATITIS TYPE (H): Status: RESOLVED | Noted: 2022-11-15 | Resolved: 2023-12-04

## 2023-12-04 LAB
CHOLEST SERPL-MCNC: 267 MG/DL
HDLC SERPL-MCNC: 69 MG/DL
LDLC SERPL CALC-MCNC: 163 MG/DL
NONHDLC SERPL-MCNC: 198 MG/DL
TRIGL SERPL-MCNC: 175 MG/DL

## 2023-12-04 PROCEDURE — 36415 COLL VENOUS BLD VENIPUNCTURE: CPT | Performed by: PHYSICIAN ASSISTANT

## 2023-12-04 PROCEDURE — G0439 PPPS, SUBSEQ VISIT: HCPCS | Performed by: PHYSICIAN ASSISTANT

## 2023-12-04 PROCEDURE — 80061 LIPID PANEL: CPT | Performed by: PHYSICIAN ASSISTANT

## 2023-12-04 RX ORDER — CHOLECALCIFEROL (VITAMIN D3) 25 MCG
CAPSULE ORAL
COMMUNITY

## 2023-12-04 RX ORDER — FLUOCINONIDE 0.5 MG/G
OINTMENT TOPICAL 2 TIMES DAILY
COMMUNITY

## 2023-12-04 ASSESSMENT — ENCOUNTER SYMPTOMS
HEMATURIA: 0
CONSTIPATION: 0
DIARRHEA: 0
PARESTHESIAS: 0
COUGH: 0
FEVER: 0
DYSURIA: 0
HEARTBURN: 0
EYE PAIN: 0
DIZZINESS: 0
WEAKNESS: 0
NERVOUS/ANXIOUS: 0
JOINT SWELLING: 0
FREQUENCY: 0
CHILLS: 0
ABDOMINAL PAIN: 0
HEMATOCHEZIA: 0
HEADACHES: 0
SORE THROAT: 0
NAUSEA: 0
ARTHRALGIAS: 0
MYALGIAS: 0
SHORTNESS OF BREATH: 0
BREAST MASS: 0
PALPITATIONS: 0

## 2023-12-04 ASSESSMENT — PAIN SCALES - GENERAL: PAINLEVEL: NO PAIN (0)

## 2023-12-04 ASSESSMENT — ACTIVITIES OF DAILY LIVING (ADL): CURRENT_FUNCTION: NO ASSISTANCE NEEDED

## 2023-12-04 NOTE — PATIENT INSTRUCTIONS
Patient Education   Personalized Prevention Plan  You are due for the preventive services outlined below.  Your care team is available to assist you in scheduling these services.  If you have already completed any of these items, please share that information with your care team to update in your medical record.  Health Maintenance Due   Topic Date Due     Zoster (Shingles) Vaccine (1 of 2) Never done     RSV VACCINE (Pregnancy & 60+) (1 - 1-dose 60+ series) Never done     Pneumococcal Vaccine (2 - PCV) 09/25/2021     Annual Wellness Visit  11/15/2023     Hearing Loss: Care Instructions  Overview     Hearing loss is a sudden or slow decrease in how well you hear. It can range from slight to profound. Permanent hearing loss can occur with aging. It also can happen when you are exposed long-term to loud noise. Examples include listening to loud music, riding motorcycles, or being around other loud machines.  Hearing loss can affect your work and home life. It can make you feel lonely or depressed. You may feel that you have lost your independence. But hearing aids and other devices can help you hear better and feel connected to others.  Follow-up care is a key part of your treatment and safety. Be sure to make and go to all appointments, and call your doctor if you are having problems. It's also a good idea to know your test results and keep a list of the medicines you take.  How can you care for yourself at home?  Avoid loud noises whenever possible. This helps keep your hearing from getting worse.  Always wear hearing protection around loud noises.  Wear a hearing aid as directed.  A professional can help you pick a hearing aid that will work best for you.  You can also get hearing aids over the counter for mild to moderate hearing loss.  Have hearing tests as your doctor suggests. They can show whether your hearing has changed. Your hearing aid may need to be adjusted.  Use other devices as needed. These may  "include:  Telephone amplifiers and hearing aids that can connect to a television, stereo, radio, or microphone.  Devices that use lights or vibrations. These alert you to the doorbell, a ringing telephone, or a baby monitor.  Television closed-captioning. This shows the words at the bottom of the screen. Most new TVs can do this.  TTY (text telephone). This lets you type messages back and forth on the telephone instead of talking or listening. These devices are also called TDD. When messages are typed on the keyboard, they are sent over the phone line to a receiving TTY. The message is shown on a monitor.  Use text messaging, social media, and email if it is hard for you to communicate by telephone.  Try to learn a listening technique called speechreading. It is not lipreading. You pay attention to people's gestures, expressions, posture, and tone of voice. These clues can help you understand what a person is saying. Face the person you are talking to, and have them face you. Make sure the lighting is good. You need to see the other person's face clearly.  Think about counseling if you need help to adjust to your hearing loss.  When should you call for help?  Watch closely for changes in your health, and be sure to contact your doctor if:    You think your hearing is getting worse.     You have new symptoms, such as dizziness or nausea.   Where can you learn more?  Go to https://www.Attend.com.net/patiented  Enter R798 in the search box to learn more about \"Hearing Loss: Care Instructions.\"  Current as of: February 28, 2023               Content Version: 13.8    3771-5719 [employer].   Care instructions adapted under license by your healthcare professional. If you have questions about a medical condition or this instruction, always ask your healthcare professional. [employer] disclaims any warranty or liability for your use of this information.         "

## 2023-12-04 NOTE — PROGRESS NOTES
"SUBJECTIVE:   Tamara is a 68 year old, presenting for the following:  Wellness Visit and Health Maintenance (Patient is fasting/Patient will get vaccines at pharmacy)        12/4/2023     8:31 AM   Additional Questions   Roomed by Gabriela Smith CMA   Accompanied by none         12/4/2023     8:31 AM   Patient Reported Additional Medications   Patient reports taking the following new medications none       Are you in the first 12 months of your Medicare coverage?  No    Healthy Habits:     In general, how would you rate your overall health?  Excellent    Frequency of exercise:  2-3 days/week    Duration of exercise:  30-45 minutes    Do you usually eat at least 4 servings of fruit and vegetables a day, include whole grains    & fiber and avoid regularly eating high fat or \"junk\" foods?  Yes    Taking medications regularly:  Yes    Medication side effects:  Not applicable    Ability to successfully perform activities of daily living:  No assistance needed    Home Safety:  No safety concerns identified    Hearing Impairment:  Difficulty following a conversation in a noisy restaurant or crowded room, difficulty understanding soft or whispered speech and difficulty understanding speech on the telephone    In the past 6 months, have you been bothered by leaking of urine?  No    In general, how would you rate your overall mental or emotional health?  Excellent    Additional concerns today:  No      Today's PHQ-2 Score:       12/3/2023     3:35 PM   PHQ-2 ( 1999 [employer])   Q1: Little interest or pleasure in doing things 0   Q2: Feeling down, depressed or hopeless 0   PHQ-2 Score 0   Q1: Little interest or pleasure in doing things Not at all   Q2: Feeling down, depressed or hopeless Not at all   PHQ-2 Score 0           Have you ever done Advance Care Planning? (For example, a Health Directive, POLST, or a discussion with a medical provider or your loved ones about your wishes): Yes, patient states has an Advance Care " Planning document and will bring a copy to the clinic.       Fall risk  Fallen 2 or more times in the past year?: No  Any fall with injury in the past year?: Yes  click delete button to remove this line now  Cognitive Screening   1) Repeat 3 items (Leader, Season, Table)    2) Clock draw: NORMAL  3) 3 item recall: Recalls 3 objects  Results: 3 items recalled: COGNITIVE IMPAIRMENT LESS LIKELY    Mini-CogTM Copyright TRA Singh. Licensed by the author for use in [facility]; reprinted with permission (christiano@[facility]). All rights reserved.      Do you have sleep apnea, excessive snoring or daytime drowsiness? : no    Reviewed and updated as needed this visit by clinical staff   Tobacco  Allergies  Meds              Reviewed and updated as needed this visit by Provider                 Social History     Tobacco Use    Smoking status: Never    Smokeless tobacco: Never   Substance Use Topics    Alcohol use: Not Currently     Comment: occasional             12/1/2023     5:21 PM   Alcohol Use   Prescreen: >3 drinks/day or >7 drinks/week? Not Applicable     Do you have a current opioid prescription? No  Do you use any other controlled substances or medications that are not prescribed by a provider? None        Current providers sharing in care for this patient include:   Patient Care Team:  Gopi Dee PA-C as PCP - General  Kelley Moralez APRN CNP as Nurse Practitioner (Colon & Rectal)  Gopi Dee PA-C as Physician Assistant (Family Medicine)  Gopi Dee PA-C as Assigned PCP  Steve Humphreys,  as Assigned Musculoskeletal Provider  Kelley Moralez APRN CNP as Assigned Surgical Provider    The following health maintenance items are reviewed in Epic and correct as of today:  Health Maintenance   Topic Date Due    ZOSTER IMMUNIZATION (1 of 2) Never done    RSV VACCINE (Pregnancy & 60+) (1 - 1-dose 60+ series) Never done    Pneumococcal Vaccine: 65+  Years (2 - PCV) 09/25/2021    MEDICARE ANNUAL WELLNESS VISIT  11/15/2023    ANNUAL REVIEW OF HM ORDERS  12/04/2024    FALL RISK ASSESSMENT  12/04/2024    MAMMO SCREENING  12/01/2025    LIPID  11/15/2027    COLORECTAL CANCER SCREENING  01/31/2028    ADVANCE CARE PLANNING  12/04/2028    DEXA  09/09/2029    DTAP/TDAP/TD IMMUNIZATION (3 - Td or Tdap) 09/27/2029    HEPATITIS C SCREENING  Completed    PHQ-2 (once per calendar year)  Completed    INFLUENZA VACCINE  Completed    COVID-19 Vaccine  Completed    IPV IMMUNIZATION  Aged Out    HPV IMMUNIZATION  Aged Out    MENINGITIS IMMUNIZATION  Aged Out    RSV MONOCLONAL ANTIBODY  Aged Out     Lab work is in process  Labs reviewed in EPIC  BP Readings from Last 3 Encounters:   12/04/23 128/84   05/02/23 138/87   04/12/23 116/77    Wt Readings from Last 3 Encounters:   12/04/23 81 kg (178 lb 9.6 oz)   05/02/23 78.5 kg (173 lb)   04/12/23 78.8 kg (173 lb 12.8 oz)                  Patient Active Problem List   Diagnosis    CARDIOVASCULAR SCREENING; LDL GOAL LESS THAN 160    Radial styloid tenosynovitis    CTS (carpal tunnel syndrome)    Atrophic vaginitis    Gallstones    Abnormal biliary HIDA scan    S/P laparoscopic cholecystectomy    Chronic pancreatitis, unspecified pancreatitis type (H)    Internal hemorrhoid     Past Surgical History:   Procedure Laterality Date    ABDOMEN SURGERY  1981    BIOPSY      CHOLECYSTECTOMY      COLONOSCOPY      COLONOSCOPY N/A 01/31/2023    Procedure: COLONOSCOPY;  Surgeon: Oscar Tyler MD;  Location: [facility]    COLONOSCOPY WITH CO2 INSUFFLATION N/A 10/13/2014    Procedure: COLONOSCOPY WITH CO2 INSUFFLATION;  Surgeon: Jamal Beach MD;  Location: [facility]    COLONOSCOPY WITH CO2 INSUFFLATION N/A 11/14/2017    Procedure: COLONOSCOPY WITH CO2 INSUFFLATION;  COLON-RECTAL BLEEDING / BARUSYA;  Surgeon: Henok Jewell MD;  Location: [facility]    COMBINED ESOPHAGOSCOPY, GASTROSCOPY, DUODENOSCOPY (EGD) WITH CO2 INSUFFLATION N/A 07/25/2019     Procedure: ESOPHAGOGASTRODUODENOSCOPY, WITH CO2 INSUFFLATION;  Surgeon: Jamal Beach MD;  Location: [facility]    ENT SURGERY      ESOPHAGOSCOPY, GASTROSCOPY, DUODENOSCOPY (EGD), COMBINED N/A 07/25/2019    Procedure: Esophagogastroduodenoscopy, With Biopsy;  Surgeon: Jamal Beach MD;  Location: [facility]    ESOPHAGOSCOPY, GASTROSCOPY, DUODENOSCOPY (EGD), COMBINED N/A 09/18/2019    Procedure: ESOPHAGOGASTRODUODENOSCOPY, WITH ENDOSCOPIC US;  Surgeon: Ric Jaeger MD;  Location: [facility]    EYE SURGERY      HEMORRHOIDECTOMY INTERNAL N/A 03/15/2023    Procedure: EXAM UNDER ANESTHESIA, ANUS, HEMORRHOIDECTOMY x 3;  Surgeon: Luis A Shaikh MD;  Location: [facility] OR    TUBAL LIGATION         Social History     Tobacco Use    Smoking status: Never    Smokeless tobacco: Never   Substance Use Topics    Alcohol use: Not Currently     Comment: occasional     Family History   Problem Relation Age of Onset    Cancer Mother         kidney    Diabetes Mother     Heart Disease Mother     Lipids Mother     Hypertension Mother     Colon Polyps Mother          benign    Hyperlipidemia Mother     Other Cancer Mother         Kidney    Osteoporosis Mother     Obesity Mother     Arthritis Father     Lipids Father     Hypertension Father     Osteoporosis Father     Hyperlipidemia Father     Prostate Cancer Father     Anxiety Disorder Father     Mental Illness Father     Arthritis Sister     Neurologic Disorder Sister         seizures    Obesity Sister     Heart Disease Sister     Cancer Brother         pancreatic    Coronary Artery Disease Brother     Obesity Brother     Other Cancer Brother         Pancreas    Thyroid Disease Son     Obesity Sister     Anesthesia Reaction Daughter     Anesthesia Reaction Daughter     Asthma Daughter     Breast Cancer No family hx of          Current Outpatient Medications   Medication Sig Dispense Refill    Carboxymethylcellul-Glycerin (REFRESH OPTIVE) 1-0.9 % GEL       cetirizine  (ZYRTEC) 10 MG tablet Take 10 mg by mouth daily as needed for allergies      Cholecalciferol (VITAMIN D-3) 25 MCG (1000 UT) CAPS       fluocinonide (LIDEX) 0.05 % external ointment Apply topically 2 times daily      ketoconazole (XOLEGEL) 2 % external gel        No Known Allergies  Recent Labs   Lab Test 11/15/22  0830 05/04/21  1034 12/23/19  0913 07/16/19  1015 10/27/17  0826 10/13/17  1554   * 135* 140*  --    < >  --    HDL 71 68 60  --    < >  --    TRIG 170* 81 150*  --    < >  --    ALT 28 25  --  39   < >  --    CR 0.92 1.01  --   --   --  0.83   GFRESTIMATED 67 57*  --   --   --  69   GFRESTBLACK  --  66  --   --   --  84   POTASSIUM 5.1 4.6  --   --   --  4.0   TSH  --   --  1.60  --   --  1.95    < > = values in this interval not displayed.            Review of Systems   Constitutional:  Negative for chills and fever.   HENT:  Negative for congestion, ear pain, hearing loss and sore throat.    Eyes:  Negative for pain and visual disturbance.   Respiratory:  Negative for cough and shortness of breath.    Cardiovascular:  Negative for chest pain, palpitations and peripheral edema.   Gastrointestinal:  Negative for abdominal pain, constipation, diarrhea, heartburn, hematochezia and nausea.   Breasts:  Negative for tenderness, breast mass and discharge.   Genitourinary:  Negative for dysuria, frequency, genital sores, hematuria, pelvic pain, urgency, vaginal bleeding and vaginal discharge.   Musculoskeletal:  Negative for arthralgias, joint swelling and myalgias.   Skin:  Negative for rash.   Neurological:  Negative for dizziness, weakness, headaches and paresthesias.   Psychiatric/Behavioral:  Negative for mood changes. The patient is not nervous/anxious.      Constitutional, HEENT, cardiovascular, pulmonary, GI, , musculoskeletal, neuro, skin, endocrine and psych systems are negative, except as otherwise noted.    OBJECTIVE:   /84   Pulse 95   Temp 97.7  F (36.5  C) (Temporal)   Resp 20    "Ht 1.664 m (5' 5.5\")   Wt 81 kg (178 lb 9.6 oz)   SpO2 100%   BMI 29.27 kg/m   Estimated body mass index is 29.27 kg/m  as calculated from the following:    Height as of this encounter: 1.664 m (5' 5.5\").    Weight as of this encounter: 81 kg (178 lb 9.6 oz).  Physical Exam  GENERAL APPEARANCE: healthy, alert and no distress  EYES: Eyes grossly normal to inspection, PERRL and conjunctivae and sclerae normal  HENT: ear canals and TM's normal, nose and mouth without ulcers or lesions, oropharynx clear and oral mucous membranes moist  NECK: no adenopathy, no asymmetry, masses, or scars and thyroid normal to palpation  RESP: lungs clear to auscultation - no rales, rhonchi or wheezes  BREAST: normal without masses, tenderness or nipple discharge and no palpable axillary masses or adenopathy  CV: regular rate and rhythm, normal S1 S2, no S3 or S4, no murmur, click or rub, no peripheral edema and peripheral pulses strong  ABDOMEN: soft, nontender, no hepatosplenomegaly, no masses and bowel sounds normal  MS: no musculoskeletal defects are noted and gait is age appropriate without ataxia  SKIN: no suspicious lesions or rashes  NEURO: Normal strength and tone, sensory exam grossly normal, mentation intact and speech normal  PSYCH: mentation appears normal and affect normal/bright    Diagnostic Test Results:  Labs reviewed in Epic    ASSESSMENT / PLAN:       ICD-10-CM    1. Encounter for annual wellness exam in Medicare patient  Z00.00       2. Lipid screening  Z13.220 Lipid panel reflex to direct LDL Fasting          Patient has been advised of split billing requirements and indicates understanding: Yes      COUNSELING:  Reviewed preventive health counseling, as reflected in patient instructions       Regular exercise       Healthy diet/nutrition      BMI:   Estimated body mass index is 29.27 kg/m  as calculated from the following:    Height as of this encounter: 1.664 m (5' 5.5\").    Weight as of this encounter: 81 kg " (178 lb 9.6 oz).   Weight management plan: Discussed healthy diet and exercise guidelines      She reports that she has never smoked. She has never used smokeless tobacco.      Appropriate preventive services were discussed with this patient, including applicable screening as appropriate for fall prevention, nutrition, physical activity, Tobacco-use cessation, weight loss and cognition.  Checklist reviewing preventive services available has been given to the patient.    Reviewed patients plan of care and provided an AVS. The Basic Care Plan (routine screening as documented in Health Maintenance) for Jena meets the Care Plan requirement. This Care Plan has been established and reviewed with the Patient.          OCTAVIA Palma [facility] TIA    Identified Health Risks:  I have reviewed Opioid Use Disorder and Substance Use Disorder risk factors and made any needed referrals.   The patient was provided with written information regarding signs of hearing loss.

## 2023-12-05 RX ORDER — ROSUVASTATIN CALCIUM 10 MG/1
10 TABLET, COATED ORAL DAILY
Qty: 90 TABLET | Refills: 1 | Status: SHIPPED | OUTPATIENT
Start: 2023-12-05 | End: 2024-05-31

## 2023-12-12 ENCOUNTER — ANCILLARY PROCEDURE (OUTPATIENT)
Dept: BONE DENSITY | Facility: CLINIC | Age: 68
End: 2023-12-12
Attending: PHYSICIAN ASSISTANT
Payer: COMMERCIAL

## 2023-12-12 ENCOUNTER — ANCILLARY ORDERS (OUTPATIENT)
Dept: FAMILY MEDICINE | Facility: CLINIC | Age: 68
End: 2023-12-12

## 2023-12-12 DIAGNOSIS — Z78.0 ASYMPTOMATIC POSTMENOPAUSAL STATUS: ICD-10-CM

## 2023-12-12 DIAGNOSIS — Z78.0 ASYMPTOMATIC POSTMENOPAUSAL STATUS: Primary | ICD-10-CM

## 2023-12-12 DIAGNOSIS — M81.0 AGE-RELATED OSTEOPOROSIS WITHOUT CURRENT PATHOLOGICAL FRACTURE: ICD-10-CM

## 2023-12-12 DIAGNOSIS — M80.00XD AGE-RELATED OSTEOPOROSIS WITH CURRENT PATHOLOGICAL FRACTURE WITH ROUTINE HEALING, SUBSEQUENT ENCOUNTER: ICD-10-CM

## 2023-12-12 PROCEDURE — 77080 DXA BONE DENSITY AXIAL: CPT | Mod: TC | Performed by: PHYSICIAN ASSISTANT

## 2023-12-12 PROCEDURE — 77081 DXA BONE DENSITY APPENDICULR: CPT | Mod: TC | Performed by: PHYSICIAN ASSISTANT

## 2024-03-01 ENCOUNTER — MYC MEDICAL ADVICE (OUTPATIENT)
Dept: FAMILY MEDICINE | Facility: CLINIC | Age: 69
End: 2024-03-01
Payer: COMMERCIAL

## 2024-03-01 DIAGNOSIS — Z13.220 LIPID SCREENING: Primary | ICD-10-CM

## 2024-03-01 NOTE — TELEPHONE ENCOUNTER
Routing [employer] message to provider.    Patient requesting a lab order to recheck cholesterol.      Tamara,  Your cholesterol numbers continue to rise. They're at the level I now want you to start taking a med to lower them. An rx for crestor was routed to your pharmacy. Take it for 3 months and then lets recheck your cholesterol numbers again.     Gopi    .thank you,    Christine M Klisch, RN

## 2024-03-05 ENCOUNTER — LAB (OUTPATIENT)
Dept: LAB | Facility: CLINIC | Age: 69
End: 2024-03-05
Payer: COMMERCIAL

## 2024-03-05 DIAGNOSIS — Z13.220 LIPID SCREENING: ICD-10-CM

## 2024-03-05 LAB
CHOLEST SERPL-MCNC: 193 MG/DL
FASTING STATUS PATIENT QL REPORTED: YES
HDLC SERPL-MCNC: 76 MG/DL
LDLC SERPL CALC-MCNC: 89 MG/DL
NONHDLC SERPL-MCNC: 117 MG/DL
TRIGL SERPL-MCNC: 139 MG/DL

## 2024-03-05 PROCEDURE — 80061 LIPID PANEL: CPT

## 2024-03-05 PROCEDURE — 36415 COLL VENOUS BLD VENIPUNCTURE: CPT

## 2024-05-31 DIAGNOSIS — E78.5 HYPERLIPIDEMIA LDL GOAL <130: ICD-10-CM

## 2024-05-31 RX ORDER — ROSUVASTATIN CALCIUM 10 MG/1
10 TABLET, COATED ORAL DAILY
Qty: 90 TABLET | Refills: 1 | Status: SHIPPED | OUTPATIENT
Start: 2024-05-31

## 2024-12-07 ENCOUNTER — ANCILLARY ORDERS (OUTPATIENT)
Dept: FAMILY MEDICINE | Facility: CLINIC | Age: 69
End: 2024-12-07

## 2024-12-07 DIAGNOSIS — Z12.31 VISIT FOR SCREENING MAMMOGRAM: Primary | ICD-10-CM

## 2024-12-08 SDOH — HEALTH STABILITY: PHYSICAL HEALTH: ON AVERAGE, HOW MANY DAYS PER WEEK DO YOU ENGAGE IN MODERATE TO STRENUOUS EXERCISE (LIKE A BRISK WALK)?: 5 DAYS

## 2024-12-08 SDOH — HEALTH STABILITY: PHYSICAL HEALTH: ON AVERAGE, HOW MANY MINUTES DO YOU ENGAGE IN EXERCISE AT THIS LEVEL?: 30 MIN

## 2024-12-08 ASSESSMENT — SOCIAL DETERMINANTS OF HEALTH (SDOH): HOW OFTEN DO YOU GET TOGETHER WITH FRIENDS OR RELATIVES?: MORE THAN THREE TIMES A WEEK

## 2024-12-09 ENCOUNTER — HOSPITAL ENCOUNTER (OUTPATIENT)
Dept: MAMMOGRAPHY | Facility: CLINIC | Age: 69
Discharge: HOME | End: 2024-12-09
Attending: PHYSICIAN ASSISTANT | Admitting: PHYSICIAN ASSISTANT
Payer: COMMERCIAL

## 2024-12-09 DIAGNOSIS — Z12.31 VISIT FOR SCREENING MAMMOGRAM: ICD-10-CM

## 2024-12-09 PROCEDURE — 77063 BREAST TOMOSYNTHESIS BI: CPT

## 2024-12-09 PROCEDURE — 77067 SCR MAMMO BI INCL CAD: CPT

## 2024-12-10 ENCOUNTER — OFFICE VISIT (OUTPATIENT)
Dept: FAMILY MEDICINE | Facility: CLINIC | Age: 69
End: 2024-12-10
Attending: PHYSICIAN ASSISTANT
Payer: COMMERCIAL

## 2024-12-10 VITALS
OXYGEN SATURATION: 98 % | HEART RATE: 84 BPM | DIASTOLIC BLOOD PRESSURE: 82 MMHG | HEIGHT: 65 IN | WEIGHT: 190 LBS | RESPIRATION RATE: 20 BRPM | TEMPERATURE: 97.9 F | BODY MASS INDEX: 31.65 KG/M2 | SYSTOLIC BLOOD PRESSURE: 122 MMHG

## 2024-12-10 DIAGNOSIS — E78.5 HYPERLIPIDEMIA LDL GOAL <130: ICD-10-CM

## 2024-12-10 DIAGNOSIS — L29.9 SCALP PRURITUS: ICD-10-CM

## 2024-12-10 DIAGNOSIS — Z13.1 SCREENING FOR DIABETES MELLITUS: ICD-10-CM

## 2024-12-10 DIAGNOSIS — Z00.00 ENCOUNTER FOR ANNUAL WELLNESS EXAM IN MEDICARE PATIENT: Primary | ICD-10-CM

## 2024-12-10 DIAGNOSIS — L20.89 OTHER ATOPIC DERMATITIS: ICD-10-CM

## 2024-12-10 LAB
ALBUMIN SERPL BCG-MCNC: 4.3 G/DL (ref 3.5–5.2)
ALP SERPL-CCNC: 82 U/L (ref 40–150)
ALT SERPL W P-5'-P-CCNC: 27 U/L (ref 0–50)
ANION GAP SERPL CALCULATED.3IONS-SCNC: 9 MMOL/L (ref 7–15)
AST SERPL W P-5'-P-CCNC: 27 U/L (ref 0–45)
BILIRUB SERPL-MCNC: 0.7 MG/DL
BUN SERPL-MCNC: 20.5 MG/DL (ref 8–23)
CALCIUM SERPL-MCNC: 9.6 MG/DL (ref 8.8–10.4)
CHLORIDE SERPL-SCNC: 104 MMOL/L (ref 98–107)
CHOLEST SERPL-MCNC: 173 MG/DL
CREAT SERPL-MCNC: 1.06 MG/DL (ref 0.51–0.95)
EGFRCR SERPLBLD CKD-EPI 2021: 56 ML/MIN/1.73M2
FASTING STATUS PATIENT QL REPORTED: YES
FASTING STATUS PATIENT QL REPORTED: YES
GLUCOSE SERPL-MCNC: 104 MG/DL (ref 70–99)
HCO3 SERPL-SCNC: 26 MMOL/L (ref 22–29)
HDLC SERPL-MCNC: 71 MG/DL
LDLC SERPL CALC-MCNC: 73 MG/DL
NONHDLC SERPL-MCNC: 102 MG/DL
POTASSIUM SERPL-SCNC: 4.6 MMOL/L (ref 3.4–5.3)
PROT SERPL-MCNC: 7 G/DL (ref 6.4–8.3)
SODIUM SERPL-SCNC: 139 MMOL/L (ref 135–145)
TRIGL SERPL-MCNC: 147 MG/DL

## 2024-12-10 PROCEDURE — 80053 COMPREHEN METABOLIC PANEL: CPT | Performed by: PHYSICIAN ASSISTANT

## 2024-12-10 PROCEDURE — 80061 LIPID PANEL: CPT | Performed by: PHYSICIAN ASSISTANT

## 2024-12-10 PROCEDURE — G0439 PPPS, SUBSEQ VISIT: HCPCS | Performed by: PHYSICIAN ASSISTANT

## 2024-12-10 PROCEDURE — 36415 COLL VENOUS BLD VENIPUNCTURE: CPT | Performed by: PHYSICIAN ASSISTANT

## 2024-12-10 RX ORDER — KETOCONAZOLE 20 MG/ML
1 SHAMPOO, SUSPENSION TOPICAL DAILY PRN
Qty: 120 ML | Refills: 1 | Status: SHIPPED | OUTPATIENT
Start: 2024-12-10

## 2024-12-10 RX ORDER — ROSUVASTATIN CALCIUM 10 MG/1
10 TABLET, COATED ORAL DAILY
Qty: 90 TABLET | Refills: 3 | Status: SHIPPED | OUTPATIENT
Start: 2024-12-10

## 2024-12-10 RX ORDER — KETOCONAZOLE 20 MG/ML
1 SHAMPOO, SUSPENSION TOPICAL
COMMUNITY
Start: 2023-05-05 | End: 2024-12-10

## 2024-12-10 RX ORDER — FLUOCINONIDE 0.5 MG/G
OINTMENT TOPICAL 2 TIMES DAILY
Qty: 80 G | Refills: 1 | Status: SHIPPED | OUTPATIENT
Start: 2024-12-10

## 2024-12-10 NOTE — PROGRESS NOTES
Preventive Care Visit  [facility] TIA Dee PA-C, Family Medicine  Dec 10, 2024        Sharan Mcdonald is a 69 year old, presenting for the following:  Wellness Visit, Health Maintenance (Patient is fasting/), and Forms        12/10/2024     8:12 AM   Additional Questions   Roomed by Gabriela Smith CMA   Accompanied by none         12/10/2024     8:12 AM   Patient Reported Additional Medications   Patient reports taking the following new medications none           HPI  Health Care Directive  Patient does not have a Health Care Directive: Patient states has Advance Directive and will bring in a copy to clinic.      12/8/2024   General Health   How would you rate your overall physical health? Good   Feel stress (tense, anxious, or unable to sleep) Not at all            12/8/2024   Nutrition   Diet: Regular (no restrictions)            12/8/2024   Exercise   Days per week of moderate/strenous exercise 5 days   Average minutes spent exercising at this level 30 min            12/8/2024   Social Factors   Frequency of gathering with friends or relatives More than three times a week   Worry food won't last until get money to buy more No   Food not last or not have enough money for food? No   Do you have housing? (Housing is defined as stable permanent housing and does not include staying ouside in a car, in a tent, in an abandoned building, in an overnight shelter, or couch-surfing.) Yes   Are you worried about losing your housing? No   Lack of transportation? No   Unable to get utilities (heat,electricity)? Yes   Want help with housing or utility concern? No      (!) FINANCIAL RESOURCE STRAIN CONCERN      12/8/2024   Fall Risk   Fallen 2 or more times in the past year? No    Trouble with walking or balance? No        Patient-reported          12/8/2024   Activities of Daily Living- Home Safety   Needs help with the following daily activites None of the above   Safety concerns in the home  None of the above            12/8/2024   Dental   Dentist two times every year? Yes            12/8/2024   Hearing Screening   Hearing concerns? (!) IT'S HARD TO FOLLOW A CONVERSATION IN A NOISY RESTAURANT OR CROWDED ROOM.            12/8/2024   Driving Risk Screening   Patient/family members have concerns about driving No            12/8/2024   General Alertness/Fatigue Screening   Have you been more tired than usual lately? No            12/8/2024   Urinary Incontinence Screening   Bothered by leaking urine in past 6 months No            12/8/2024   TB Screening   Were you born outside of the [place]? No            Today's PHQ-2 Score:       12/10/2024     7:56 AM   PHQ-2 ( 1999 [employer])   Q1: Little interest or pleasure in doing things 0    Q2: Feeling down, depressed or hopeless 0    PHQ-2 Score 0    Q1: Little interest or pleasure in doing things Not at all   Q2: Feeling down, depressed or hopeless Not at all   PHQ-2 Score 0       Patient-reported           12/8/2024   Substance Use   Alcohol more than 3/day or more than 7/wk Not Applicable   Do you have a current opioid prescription? No   How severe/bad is pain from 1 to 10? 3/10   Do you use any other substances recreationally? No        Social History     Tobacco Use    Smoking status: Never    Smokeless tobacco: Never   Vaping Use    Vaping status: Never Used   Substance Use Topics    Alcohol use: Not Currently     Comment: occasional    Drug use: No           12/9/2024   LAST FHS-7 RESULTS   1st degree relative breast or ovarian cancer No   Any relative bilateral breast cancer No   Any male have breast cancer No   Any ONE woman have BOTH breast AND ovarian cancer No   Any woman with breast cancer before 50yrs No   2 or more relatives with breast AND/OR ovarian cancer No   2 or more relatives with breast AND/OR bowel cancer No           Mammogram Screening - Annual screen due to greater than 20% lifetime risk as estimated by Breast Cancer Risk Calculator    ASCVD  Risk   The 10-year ASCVD risk score (Mick PAINTING, et al., 2019) is: 9.1%    Values used to calculate the score:      Age: 69 years      Sex: Female      Is Non- : No      Diabetic: No      Tobacco smoker: No      Systolic Blood Pressure: 122 mmHg      Is BP treated: No      HDL Cholesterol: 76 mg/dL      Total Cholesterol: 193 mg/dL    Fracture Risk Assessment Tool  Link to Frax Calculator  Use the information below to complete the Frax calculator  : 1955  Sex: female  Weight (kg): 86.2 kg (actual weight)  Height (cm): 166.3 cm  Previous Fragility Fracture:  No  History of parent with fractured hip:  No  Current Smoking:  No  Patient has been on glucocorticoids for more than 3 months (5mg/day or more): No  Rheumatoid Arthritis on Problem List:  No  Secondary Osteoporosis on Problem List:  No  Consumes 3 or more units of alcohol per day: No  Femoral Neck BMD (g/cm2)            Reviewed and updated as needed this visit by Provider                    Past Medical History:   Diagnosis Date    Diverticulosis     History of colon polyps     Hyperlipidemia LDL goal < 160     Osteopenia      Past Surgical History:   Procedure Laterality Date    ABDOMEN SURGERY      BIOPSY      CHOLECYSTECTOMY      COLONOSCOPY      COLONOSCOPY N/A 2023    Procedure: COLONOSCOPY;  Surgeon: Oscar Tyler MD;  Location:  GI    COLONOSCOPY WITH CO2 INSUFFLATION N/A 10/13/2014    Procedure: COLONOSCOPY WITH CO2 INSUFFLATION;  Surgeon: Jamal Beach MD;  Location:  OR    COLONOSCOPY WITH CO2 INSUFFLATION N/A 2017    Procedure: COLONOSCOPY WITH CO2 INSUFFLATION;  COLON-RECTAL BLEEDING / BARUSYA;  Surgeon: Henok Jewell MD;  Location:  OR    COMBINED ESOPHAGOSCOPY, GASTROSCOPY, DUODENOSCOPY (EGD) WITH CO2 INSUFFLATION N/A 2019    Procedure: ESOPHAGOGASTRODUODENOSCOPY, WITH CO2 INSUFFLATION;  Surgeon: Jamal Beach MD;  Location:  OR    ENT SURGERY       ESOPHAGOSCOPY, GASTROSCOPY, DUODENOSCOPY (EGD), COMBINED N/A 07/25/2019    Procedure: Esophagogastroduodenoscopy, With Biopsy;  Surgeon: Jamal Beach MD;  Location: [facility] OR    ESOPHAGOSCOPY, GASTROSCOPY, DUODENOSCOPY (EGD), COMBINED N/A 09/18/2019    Procedure: ESOPHAGOGASTRODUODENOSCOPY, WITH ENDOSCOPIC US;  Surgeon: Ric Jaeger MD;  Location: [facility] OR    EYE SURGERY      HEMORRHOIDECTOMY INTERNAL N/A 03/15/2023    Procedure: EXAM UNDER ANESTHESIA, ANUS, HEMORRHOIDECTOMY x 3;  Surgeon: Luis A Shaikh MD;  Location: [facility] OR    TUBAL LIGATION       BP Readings from Last 3 Encounters:   12/10/24 122/82   12/04/23 128/84   05/02/23 138/87    Wt Readings from Last 3 Encounters:   12/10/24 86.2 kg (190 lb)   12/04/23 81 kg (178 lb 9.6 oz)   05/02/23 78.5 kg (173 lb)                  Patient Active Problem List   Diagnosis    CARDIOVASCULAR SCREENING; LDL GOAL LESS THAN 160    Radial styloid tenosynovitis    CTS (carpal tunnel syndrome)    Atrophic vaginitis    Gallstones    Abnormal biliary HIDA scan    S/P laparoscopic cholecystectomy    Internal hemorrhoid     Past Surgical History:   Procedure Laterality Date    ABDOMEN SURGERY  1981    BIOPSY      CHOLECYSTECTOMY      COLONOSCOPY      COLONOSCOPY N/A 01/31/2023    Procedure: COLONOSCOPY;  Surgeon: Oscar Tyler MD;  Location: [facility]    COLONOSCOPY WITH CO2 INSUFFLATION N/A 10/13/2014    Procedure: COLONOSCOPY WITH CO2 INSUFFLATION;  Surgeon: Jamal Beach MD;  Location: [facility]    COLONOSCOPY WITH CO2 INSUFFLATION N/A 11/14/2017    Procedure: COLONOSCOPY WITH CO2 INSUFFLATION;  COLON-RECTAL BLEEDING / BARUSYA;  Surgeon: Henok Jewell MD;  Location: [facility]    COMBINED ESOPHAGOSCOPY, GASTROSCOPY, DUODENOSCOPY (EGD) WITH CO2 INSUFFLATION N/A 07/25/2019    Procedure: ESOPHAGOGASTRODUODENOSCOPY, WITH CO2 INSUFFLATION;  Surgeon: Jamal Beach MD;  Location: [facility] OR    ENT SURGERY      ESOPHAGOSCOPY, GASTROSCOPY,  DUODENOSCOPY (EGD), COMBINED N/A 07/25/2019    Procedure: Esophagogastroduodenoscopy, With Biopsy;  Surgeon: Jamal Beach MD;  Location: [facility]    ESOPHAGOSCOPY, GASTROSCOPY, DUODENOSCOPY (EGD), COMBINED N/A 09/18/2019    Procedure: ESOPHAGOGASTRODUODENOSCOPY, WITH ENDOSCOPIC US;  Surgeon: Ric Jaeger MD;  Location: [facility] OR    EYE SURGERY      HEMORRHOIDECTOMY INTERNAL N/A 03/15/2023    Procedure: EXAM UNDER ANESTHESIA, ANUS, HEMORRHOIDECTOMY x 3;  Surgeon: Luis A Shaikh MD;  Location: [facility] OR    TUBAL LIGATION         Social History     Tobacco Use    Smoking status: Never    Smokeless tobacco: Never   Substance Use Topics    Alcohol use: Not Currently     Comment: occasional     Family History   Problem Relation Age of Onset    Cancer Mother         kidney    Diabetes Mother     Heart Disease Mother     Lipids Mother     Hypertension Mother     Colon Polyps Mother          benign    Hyperlipidemia Mother     Other Cancer Mother         Kidney    Osteoporosis Mother     Obesity Mother     Arthritis Father     Lipids Father     Hypertension Father     Osteoporosis Father     Hyperlipidemia Father     Prostate Cancer Father     Anxiety Disorder Father     Mental Illness Father     Arthritis Sister     Neurologic Disorder Sister         seizures    Obesity Sister     Heart Disease Sister     Colon Cancer Sister     Cancer Brother         pancreatic    Coronary Artery Disease Brother     Obesity Brother     Other Cancer Brother         Pancreas    Thyroid Disease Son     Obesity Sister     Anesthesia Reaction Daughter     Anesthesia Reaction Daughter     Asthma Daughter     Breast Cancer No family hx of          Current Outpatient Medications   Medication Sig Dispense Refill    Carboxymethylcellul-Glycerin (REFRESH OPTIVE) 1-0.9 % GEL       cetirizine (ZYRTEC) 10 MG tablet Take 10 mg by mouth daily as needed for allergies      Cholecalciferol (VITAMIN D-3) 25 MCG (1000 UT) CAPS        fluocinonide (LIDEX) 0.05 % external ointment Apply topically 2 times daily. 80 g 1    ketoconazole (NIZORAL) 2 % external shampoo Apply 1 mL topically daily as needed for itching or irritation. 120 mL 1    rosuvastatin (CRESTOR) 10 MG tablet Take 1 tablet (10 mg) by mouth daily. 90 tablet 3    ketoconazole (XOLEGEL) 2 % external gel  (Patient not taking: Reported on 12/10/2024)       No Known Allergies  Recent Labs   Lab Test 03/05/24  0815 12/04/23  0903 11/15/22  0830 05/04/21  1034 12/23/19  0913 07/16/19  1015 10/27/17  0826 10/13/17  1554   LDL 89 163* 152* 135* 140*  --    < >  --    HDL 76 69 71 68 60  --    < >  --    TRIG 139 175* 170* 81 150*  --    < >  --    ALT  --   --  28 25  --  39   < >  --    CR  --   --  0.92 1.01  --   --   --  0.83   GFRESTIMATED  --   --  67 57*  --   --   --  69   GFRESTBLACK  --   --   --  66  --   --   --  84   POTASSIUM  --   --  5.1 4.6  --   --   --  4.0   TSH  --   --   --   --  1.60  --   --  1.95    < > = values in this interval not displayed.      Current providers sharing in care for this patient include:  Patient Care Team:  Gopi Dee PA-C as PCP - Kelley Workman APRN CNP as Nurse Practitioner (Colon & Rectal)  Gopi Dee PA-C as Physician Assistant (Family Medicine)  Gopi Dee PA-C as Assigned PCP    The following health maintenance items are reviewed in Epic and correct as of today:  Health Maintenance   Topic Date Due    Pneumococcal Vaccine: 65+ Years (2 of 2 - PCV) 09/25/2021    MEDICARE ANNUAL WELLNESS VISIT  12/04/2024    LIPID  03/05/2025    GLUCOSE  11/15/2025    ANNUAL REVIEW OF HM ORDERS  12/10/2025    FALL RISK ASSESSMENT  12/10/2025    MAMMO SCREENING  12/09/2026    COLORECTAL CANCER SCREENING  01/31/2028    RSV VACCINE (1 - 1-dose 75+ series) 03/31/2028    DTAP/TDAP/TD IMMUNIZATION (3 - Td or Tdap) 09/27/2029    ADVANCE CARE PLANNING  12/10/2029    DEXA  12/12/2038    HEPATITIS C SCREENING  Completed  "   PHQ-2 (once per calendar year)  Completed    INFLUENZA VACCINE  Completed    ZOSTER IMMUNIZATION  Completed    COVID-19 Vaccine  Completed    HPV IMMUNIZATION  Aged Out    MENINGITIS IMMUNIZATION  Aged Out    RSV MONOCLONAL ANTIBODY  Aged Out         Review of Systems  Constitutional, HEENT, cardiovascular, pulmonary, GI, , musculoskeletal, neuro, skin, endocrine and psych systems are negative, except as otherwise noted.     Objective    Exam  /82   Pulse 84   Temp 97.9  F (36.6  C) (Oral)   Resp 20   Ht 1.662 m (5' 5.45\")   Wt 86.2 kg (190 lb)   SpO2 98%   BMI 31.18 kg/m     Estimated body mass index is 31.18 kg/m  as calculated from the following:    Height as of this encounter: 1.662 m (5' 5.45\").    Weight as of this encounter: 86.2 kg (190 lb).    Physical Exam  GENERAL: alert and no distress  EYES: Eyes grossly normal to inspection, PERRL and conjunctivae and sclerae normal  HENT: ear canals and TM's normal, nose and mouth without ulcers or lesions  NECK: no adenopathy, no asymmetry, masses, or scars  RESP: lungs clear to auscultation - no rales, rhonchi or wheezes  CV: regular rate and rhythm, normal S1 S2, no S3 or S4, no murmur, click or rub, no peripheral edema  ABDOMEN: soft, nontender, no hepatosplenomegaly, no masses and bowel sounds normal  MS: no gross musculoskeletal defects noted, no edema  SKIN: no suspicious lesions or rashes  NEURO: Normal strength and tone, mentation intact and speech normal  PSYCH: mentation appears normal, affect normal/bright          12/10/2024   Mini Cog   Clock Draw Score 2 Normal   3 Item Recall 3 objects recalled   Mini Cog Total Score 5          Tamara was seen today for wellness visit, health maintenance and forms.    Diagnoses and all orders for this visit:    Encounter for annual wellness exam in Medicare patient    Hyperlipidemia LDL goal <130  -     rosuvastatin (CRESTOR) 10 MG tablet; Take 1 tablet (10 mg) by mouth daily.  -     Lipid panel reflex " to direct LDL Fasting; Future  -     Comprehensive metabolic panel (BMP + Alb, Alk Phos, ALT, AST, Total. Bili, TP); Future    Screening for diabetes mellitus  -     Cancel: Glucose; Future  -     Comprehensive metabolic panel (BMP + Alb, Alk Phos, ALT, AST, Total. Bili, TP); Future    Scalp pruritus  -     ketoconazole (NIZORAL) 2 % external shampoo; Apply 1 mL topically daily as needed for itching or irritation.    Other atopic dermatitis  -     fluocinonide (LIDEX) 0.05 % external ointment; Apply topically 2 times daily.    Other orders  -     PRIMARY CARE FOLLOW-UP SCHEDULING  -     REVIEW OF HEALTH MAINTENANCE PROTOCOL ORDERS      Continue to work on a lower sugar/starch diet and more exercise.  Lower fat, higher fiber diet and consistent exercise.         Signed Electronically by: Gopi Dee PA-C

## 2025-05-12 DIAGNOSIS — L29.9 SCALP PRURITUS: ICD-10-CM

## 2025-05-12 RX ORDER — KETOCONAZOLE 20 MG/ML
1 SHAMPOO, SUSPENSION TOPICAL DAILY PRN
Qty: 120 ML | Refills: 1 | Status: SHIPPED | OUTPATIENT
Start: 2025-05-12

## (undated) DEVICE — SYR 50ML SLIP TIP W/O NDL 309654

## (undated) DEVICE — ESU GROUND PAD ADULT W/CORD E7507

## (undated) DEVICE — GLOVE PROTEXIS W/NEU-THERA 7.0  2D73TE70

## (undated) DEVICE — SURGICEL HEMOSTAT 4X8" 1952

## (undated) DEVICE — PREP CHLORAPREP 26ML TINTED ORANGE  260815

## (undated) DEVICE — SOL WATER IRRIG 1000ML BOTTLE 07139-09

## (undated) DEVICE — TAPE MEDIPORE 4"X2YD 2864

## (undated) DEVICE — SU VICRYL 3-0 SH 27" UND J416H

## (undated) DEVICE — PAD CHUX UNDERPAD 30X30"

## (undated) DEVICE — SOL WATER IRRIG 500ML BOTTLE 2F7113

## (undated) DEVICE — RX BACITRACIN OINTMENT 14G 0.5OZ 45802-060-01

## (undated) DEVICE — GLOVE PROTEXIS BLUE W/NEU-THERA 7.5  2D73EB75

## (undated) DEVICE — TAPE DURAPORE 3" SILK 1538-3

## (undated) DEVICE — SU CHROMIC 3-0 SH 27" G122H

## (undated) DEVICE — LINEN TOWEL PACK X5 5464

## (undated) DEVICE — SUCTION MANIFOLD NEPTUNE 2 SYS 1 PORT 702-025-000

## (undated) DEVICE — DRSG GAUZE 4X4" TRAY 6939

## (undated) DEVICE — PANTIES MESH LG/XLG 2PK 706M2

## (undated) DEVICE — KIT ENDO TURNOVER/PROCEDURE CARRY-ON 101822

## (undated) DEVICE — SOL WATER IRRIG 1000ML BOTTLE 2F7114

## (undated) DEVICE — PREP POVIDONE IODINE SOLUTION 10% 4OZ BOTTLE 29906-004

## (undated) DEVICE — PACK RECTAL KIT CUSTOM ASC

## (undated) DEVICE — TUBING SUCTION 6"X3/16" N56A

## (undated) RX ORDER — FENTANYL CITRATE 50 UG/ML
INJECTION, SOLUTION INTRAMUSCULAR; INTRAVENOUS
Status: DISPENSED
Start: 2019-07-25

## (undated) RX ORDER — PROPOFOL 10 MG/ML
INJECTION, EMULSION INTRAVENOUS
Status: DISPENSED
Start: 2023-03-15

## (undated) RX ORDER — DEXAMETHASONE SODIUM PHOSPHATE 4 MG/ML
INJECTION, SOLUTION INTRA-ARTICULAR; INTRALESIONAL; INTRAMUSCULAR; INTRAVENOUS; SOFT TISSUE
Status: DISPENSED
Start: 2023-03-15

## (undated) RX ORDER — ONDANSETRON 2 MG/ML
INJECTION INTRAMUSCULAR; INTRAVENOUS
Status: DISPENSED
Start: 2023-03-15

## (undated) RX ORDER — PROPOFOL 10 MG/ML
INJECTION, EMULSION INTRAVENOUS
Status: DISPENSED
Start: 2019-09-18

## (undated) RX ORDER — PHENYLEPHRINE HCL IN 0.9% NACL 1 MG/10 ML
SYRINGE (ML) INTRAVENOUS
Status: DISPENSED
Start: 2019-09-18

## (undated) RX ORDER — ACETAMINOPHEN 325 MG/1
TABLET ORAL
Status: DISPENSED
Start: 2023-03-15

## (undated) RX ORDER — SIMETHICONE 40MG/0.6ML
SUSPENSION, DROPS(FINAL DOSAGE FORM)(ML) ORAL
Status: DISPENSED
Start: 2019-07-25

## (undated) RX ORDER — ONDANSETRON 2 MG/ML
INJECTION INTRAMUSCULAR; INTRAVENOUS
Status: DISPENSED
Start: 2019-09-18

## (undated) RX ORDER — FENTANYL CITRATE-0.9 % NACL/PF 10 MCG/ML
PLASTIC BAG, INJECTION (ML) INTRAVENOUS
Status: DISPENSED
Start: 2023-03-15

## (undated) RX ORDER — FENTANYL CITRATE 50 UG/ML
INJECTION, SOLUTION INTRAMUSCULAR; INTRAVENOUS
Status: DISPENSED
Start: 2019-09-18

## (undated) RX ORDER — LIDOCAINE HYDROCHLORIDE 20 MG/ML
INJECTION, SOLUTION INFILTRATION; PERINEURAL
Status: DISPENSED
Start: 2019-09-18

## (undated) RX ORDER — FENTANYL CITRATE 50 UG/ML
INJECTION, SOLUTION INTRAMUSCULAR; INTRAVENOUS
Status: DISPENSED
Start: 2023-03-15

## (undated) RX ORDER — FENTANYL CITRATE 50 UG/ML
INJECTION, SOLUTION INTRAMUSCULAR; INTRAVENOUS
Status: DISPENSED
Start: 2017-11-14

## (undated) RX ORDER — ONDANSETRON 2 MG/ML
INJECTION INTRAMUSCULAR; INTRAVENOUS
Status: DISPENSED
Start: 2019-07-25